# Patient Record
Sex: FEMALE | Race: WHITE | Employment: OTHER | URBAN - METROPOLITAN AREA
[De-identification: names, ages, dates, MRNs, and addresses within clinical notes are randomized per-mention and may not be internally consistent; named-entity substitution may affect disease eponyms.]

---

## 2017-09-10 PROBLEM — N39.0 UTI (URINARY TRACT INFECTION): Status: ACTIVE | Noted: 2017-09-10

## 2017-09-10 PROBLEM — K21.9 GERD (GASTROESOPHAGEAL REFLUX DISEASE): Status: ACTIVE | Noted: 2017-09-10

## 2017-09-10 PROBLEM — E78.5 HLD (HYPERLIPIDEMIA): Status: ACTIVE | Noted: 2017-09-10

## 2017-09-10 PROBLEM — I50.32 CHRONIC DIASTOLIC CHF (CONGESTIVE HEART FAILURE) (HCC): Status: ACTIVE | Noted: 2017-09-10

## 2017-09-10 PROBLEM — S72.001A CLOSED RIGHT HIP FRACTURE (HCC): Status: ACTIVE | Noted: 2017-09-10

## 2017-09-10 PROBLEM — I10 HTN (HYPERTENSION): Status: ACTIVE | Noted: 2017-09-10

## 2017-09-18 ENCOUNTER — CARE COORDINATION (OUTPATIENT)
Dept: CASE MANAGEMENT | Age: 82
End: 2017-09-18

## 2017-09-28 ENCOUNTER — OFFICE VISIT (OUTPATIENT)
Dept: ORTHOPEDIC SURGERY | Age: 82
End: 2017-09-28

## 2017-09-28 VITALS — HEIGHT: 60 IN | BODY MASS INDEX: 31.41 KG/M2 | WEIGHT: 160 LBS

## 2017-09-28 DIAGNOSIS — S72.001A CLOSED RIGHT HIP FRACTURE, INITIAL ENCOUNTER (HCC): Primary | ICD-10-CM

## 2017-09-28 PROCEDURE — 99024 POSTOP FOLLOW-UP VISIT: CPT | Performed by: NURSE PRACTITIONER

## 2017-09-28 PROCEDURE — 73502 X-RAY EXAM HIP UNI 2-3 VIEWS: CPT | Performed by: NURSE PRACTITIONER

## 2017-09-29 ENCOUNTER — CARE COORDINATION (OUTPATIENT)
Dept: CASE MANAGEMENT | Age: 82
End: 2017-09-29

## 2017-09-30 ENCOUNTER — CARE COORDINATION (OUTPATIENT)
Dept: CASE MANAGEMENT | Age: 82
End: 2017-09-30

## 2017-10-02 ENCOUNTER — TELEPHONE (OUTPATIENT)
Dept: ORTHOPEDIC SURGERY | Age: 82
End: 2017-10-02

## 2017-10-06 ENCOUNTER — CARE COORDINATION (OUTPATIENT)
Dept: CASE MANAGEMENT | Age: 82
End: 2017-10-06

## 2017-10-24 ENCOUNTER — CARE COORDINATION (OUTPATIENT)
Dept: CASE MANAGEMENT | Age: 82
End: 2017-10-24

## 2017-10-24 NOTE — CARE COORDINATION
Patient. Jimmy Echevarria  Date of Birth. 3/11/1930  MRN. [id number]     [facility] attempted to reach patient for [address] follow up call . No answer, Left message with reason for call and contact information.         Follow up appointments:    Future Appointments  Date Time Provider Kevin Haji   11/9/2017 2:30 PM Bhavesh Black MD [facility]     Naheed Mei MSN, MA, RN  Care Transition Coordinator  [phone]

## 2017-10-25 NOTE — CARE COORDINATION
Patient. Hawk Alcala  Date of Birth. 3/11/1930  MRN. [id number]     Spoke with patient's daughter    Incision status: completely healed    Edema/Swelling: none    Pain level and status: managed well    Use of pain medications: only as needed    Bowels: no issues with constipation    Home Care Agency active: [facility],   Skilled nursing is completed, PT/OT still working with her.      Do you have all of your medications: yes    Changes in medications: no    Follow up appointments:    Future Appointments  Date Time Provider Kevin Haji   11/9/2017 2:30 PM Marvin Cates MD [facility]       Naheed Mei MSN, MA, RN  Care Transition Coordinator  [phone]

## 2017-11-13 ENCOUNTER — CARE COORDINATION (OUTPATIENT)
Dept: CASE MANAGEMENT | Age: 82
End: 2017-11-13

## 2017-11-14 ENCOUNTER — OFFICE VISIT (OUTPATIENT)
Dept: ORTHOPEDIC SURGERY | Age: 82
End: 2017-11-14

## 2017-11-14 VITALS — RESPIRATION RATE: 15 BRPM | WEIGHT: 160 LBS | BODY MASS INDEX: 31.41 KG/M2 | HEIGHT: 60 IN

## 2017-11-14 DIAGNOSIS — S72.001A CLOSED FRACTURE OF RIGHT HIP, INITIAL ENCOUNTER (HCC): Primary | ICD-10-CM

## 2017-11-14 DIAGNOSIS — S90.32XA CONTUSION OF LEFT FOOT, INITIAL ENCOUNTER: ICD-10-CM

## 2017-11-14 DIAGNOSIS — M79.672 LEFT FOOT PAIN: ICD-10-CM

## 2017-11-14 PROCEDURE — 73502 X-RAY EXAM HIP UNI 2-3 VIEWS: CPT | Performed by: ORTHOPAEDIC SURGERY

## 2017-11-14 PROCEDURE — G8417 CALC BMI ABV UP PARAM F/U: HCPCS | Performed by: ORTHOPAEDIC SURGERY

## 2017-11-14 PROCEDURE — 73630 X-RAY EXAM OF FOOT: CPT | Performed by: ORTHOPAEDIC SURGERY

## 2017-11-14 PROCEDURE — 4040F PNEUMOC VAC/ADMIN/RCVD: CPT | Performed by: ORTHOPAEDIC SURGERY

## 2017-11-14 PROCEDURE — 99213 OFFICE O/P EST LOW 20 MIN: CPT | Performed by: ORTHOPAEDIC SURGERY

## 2017-11-14 PROCEDURE — G8484 FLU IMMUNIZE NO ADMIN: HCPCS | Performed by: ORTHOPAEDIC SURGERY

## 2017-11-14 PROCEDURE — 1036F TOBACCO NON-USER: CPT | Performed by: ORTHOPAEDIC SURGERY

## 2017-11-14 PROCEDURE — 1090F PRES/ABSN URINE INCON ASSESS: CPT | Performed by: ORTHOPAEDIC SURGERY

## 2017-11-14 PROCEDURE — G8427 DOCREV CUR MEDS BY ELIG CLIN: HCPCS | Performed by: ORTHOPAEDIC SURGERY

## 2017-11-14 PROCEDURE — 1123F ACP DISCUSS/DSCN MKR DOCD: CPT | Performed by: ORTHOPAEDIC SURGERY

## 2017-11-14 NOTE — PROGRESS NOTES
use No    Drug use: No    Sexual activity: Not Currently     Other Topics Concern    Not on file     Social History Narrative    No narrative on file       Family History   Problem Relation Age of Onset    Heart Disease Mother     Cancer Father        Current Outpatient Prescriptions on File Prior to Visit   Medication Sig Dispense Refill    acetaminophen (TYLENOL) 325 MG tablet Take 2 tablets by mouth every 4 hours as needed for Pain or Fever 120 tablet 3    ALPRAZolam (XANAX) 0.25 MG tablet Take 1 tablet by mouth 2 times daily 1-2 tabs daily 10 tablet 0    albuterol (PROVENTIL) (2.5 MG/3ML) 0.083% nebulizer solution Take 3 mLs by nebulization every 4 hours as needed for Wheezing or Shortness of Breath 120 each 3    apixaban (ELIQUIS) 5 MG TABS tablet Take 1 tablet by mouth 2 times daily 166 tablet 0    diphenhydrAMINE (BENADRYL) 25 MG tablet Take 25 mg by mouth daily as needed for Allergies       guaiFENesin-codeine (CHERATUSSIN AC) 100-10 MG/5ML syrup Take 5 mLs by mouth every 4 hours as needed for Cough      amLODIPine (NORVASC) 2.5 MG tablet Take 2.5 mg by mouth daily      furosemide (LASIX) 40 MG tablet Take 40 mg by mouth daily      atorvastatin (LIPITOR) 10 MG tablet Take 10 mg by mouth daily      OXYGEN nightly.  Multiple Vitamins-Minerals (MULTI FOR HER 50+ PO) Take  by mouth daily.  Methylcellulose, Laxative, (CITRUCEL PO) Take  by mouth as needed.  Calcium Carbonate Antacid (TUMS ULTRA 1000 PO) Take 1 tablet by mouth daily       carvedilol (COREG) 3.125 MG tablet Take 6.25 mg by mouth 2 times daily (with meals)       omeprazole (PRILOSEC) 20 MG capsule Take 20 mg by mouth 2 times daily.  dicyclomine (BENTYL) 10 MG capsule Take 10 mg by mouth 4 times daily as needed       Fluticasone-Salmeterol (ADVAIR DISKUS IN) Inhale 2 puffs into the lungs 2 times daily.       docusate sodium (COLACE) 100 MG capsule Take 100 mg by mouth 2 times daily       alendronate

## 2017-11-14 NOTE — LETTER
 HIP ARTHROPLASTY Right 09/12/2017    right bipolar hip    MOHS SURGERY  7/2012    PARATHYROIDECTOMY      [place] LEFT HEART CATH,PERCUTANEOUS      Cardiac Cath, Left Heart       Social History     Social History    Marital status:      Spouse name: N/A    Number of children: N/A    Years of education: N/A     Occupational History    Not on file. Social History Main Topics    Smoking status: Former Smoker    Smokeless tobacco: Never Used    Alcohol use No    Drug use: No    Sexual activity: Not Currently     Other Topics Concern    Not on file     Social History Narrative    No narrative on file       Family History   Problem Relation Age of Onset    Heart Disease Mother     Cancer Father        Current Outpatient Prescriptions on File Prior to Visit   Medication Sig Dispense Refill    acetaminophen (TYLENOL) 325 MG tablet Take 2 tablets by mouth every 4 hours as needed for Pain or Fever 120 tablet 3    ALPRAZolam (XANAX) 0.25 MG tablet Take 1 tablet by mouth 2 times daily 1-2 tabs daily 10 tablet 0    albuterol (PROVENTIL) (2.5 MG/3ML) 0.083% nebulizer solution Take 3 mLs by nebulization every 4 hours as needed for Wheezing or Shortness of Breath 120 each 3    apixaban (ELIQUIS) 5 MG TABS tablet Take 1 tablet by mouth 2 times daily 166 tablet 0    diphenhydrAMINE (BENADRYL) 25 MG tablet Take 25 mg by mouth daily as needed for Allergies       guaiFENesin-codeine (CHERATUSSIN AC) 100-10 MG/5ML syrup Take 5 mLs by mouth every 4 hours as needed for Cough      amLODIPine (NORVASC) 2.5 MG tablet Take 2.5 mg by mouth daily      furosemide (LASIX) 40 MG tablet Take 40 mg by mouth daily      atorvastatin (LIPITOR) 10 MG tablet Take 10 mg by mouth daily      OXYGEN nightly.  Multiple Vitamins-Minerals (MULTI FOR HER 50+ PO) Take  by mouth daily.  Methylcellulose, Laxative, (CITRUCEL PO) Take  by mouth as needed.

## 2017-11-15 ENCOUNTER — CARE COORDINATION (OUTPATIENT)
Dept: CASE MANAGEMENT | Age: 82
End: 2017-11-15

## 2017-11-15 NOTE — CARE COORDINATION
Spoke with Sofiya Carter    Incision status: Completely healed    Edema/Swelling: denies swelling or edema    Pain level and status: Denies significant hip pain, occasionally has pain that improves with rest.     Use of pain medications: none    Bowels: active no issues or concerns    Outpatient therapy: Rehab at [facility], and progressing well.     Do you have all of your medications: yes    Changes in medications: none    Had orthopedic appt with Dr. Reta English yesterday 11/14/17    Follow up appointments:    Future Appointments  Date Time Provider Kevin Haji   12/21/2017 1:45 PM Bruce Siegel MD [facility]       Cuauhtemoc Barker MSN, MA, RN  Care Transition Coordinator   [phone]

## 2017-11-27 ENCOUNTER — TELEPHONE (OUTPATIENT)
Dept: CARDIOLOGY CLINIC | Age: 82
End: 2017-11-27

## 2017-11-27 NOTE — TELEPHONE ENCOUNTER
Please review and advise. I have called Dr. Gracie Hicks office and requested records to be faxed to our office. I requested last couple o/v notes, most recent labs, ekg, echo, gxt. Patient will be a new patient to us since her last o/v was in 2012. Please give date/time that pt can come in so that the front office can schedule. Thank you.

## 2017-11-27 NOTE — TELEPHONE ENCOUNTER
I was able to grab a 15 min slot on 12/11/17 at 11:15am. (even though it is only 15 min for a NP) I already scheduled it.  Please call pt family

## 2017-12-01 ENCOUNTER — CARE COORDINATION (OUTPATIENT)
Dept: CASE MANAGEMENT | Age: 82
End: 2017-12-01

## 2017-12-07 NOTE — COMMUNICATION BODY
DIAGNOSIS:    1- Right femoral neck displaced fracture, status post Press-Fit bipolar hemiarthroplasty. 2- Left foot pain/ foot contusion-new    DATE OF SURGERY:  9/10/2017  . HISTORY OF PRESENT ILLNESS:  Ms. Cole Lisa 80 y.o.  female who came in today for evaluation of new left foot pain and 6 weeks postoperative visit. The patient denies any significant pain in the right hip. Rates pain a 4-5/10 VAS mild, aching, intermittent and improving. Pain is worse with walking and better with rest.  She  has been walking weightbearing as tolerated using a walker working with PT in rehab at [facility] and doing well. No numbness or tingling sensation. No fever or Chills. She is here today for the first visit for evaluation of a left foot injury which occurred when her walker fell and hit her foot. She is complaining of foot pain and swelling. Rates pain a 3-4/10 VAS and constant throbbing. This is better with elevation and worse with bearing wt. The pain is sharp and not radiating. No other complaint. She is on Eliquis. Past Medical History:   Diagnosis Date    Cardiomyopathy [facility])     Chemotherapy     CHF (congestive heart failure) (HCC)     Emphysema     Emphysema     Emphysema (subcutaneous) (surgical) resulting from a procedure     Hypertension     Osteoarthritis     Thyroid disease     Had parathyroidectomy       Past Surgical History:   Procedure Laterality Date    BREAST SURGERY      Left breast lumpectomy    FOOT SURGERY      toe    HIP ARTHROPLASTY Right 09/12/2017    right bipolar hip    MOHS SURGERY  7/2012    PARATHYROIDECTOMY      [place] LEFT HEART CATH,PERCUTANEOUS      Cardiac Cath, Left Heart       Social History     Social History    Marital status:      Spouse name: N/A    Number of children: N/A    Years of education: N/A     Occupational History    Not on file.      Social History Main Topics    Smoking status: Former Smoker    Smokeless tobacco: Never Used    Alcohol use No    Drug use: No    Sexual activity: Not Currently     Other Topics Concern    Not on file     Social History Narrative    No narrative on file       Family History   Problem Relation Age of Onset    Heart Disease Mother     Cancer Father        Current Outpatient Prescriptions on File Prior to Visit   Medication Sig Dispense Refill    acetaminophen (TYLENOL) 325 MG tablet Take 2 tablets by mouth every 4 hours as needed for Pain or Fever 120 tablet 3    ALPRAZolam (XANAX) 0.25 MG tablet Take 1 tablet by mouth 2 times daily 1-2 tabs daily 10 tablet 0    albuterol (PROVENTIL) (2.5 MG/3ML) 0.083% nebulizer solution Take 3 mLs by nebulization every 4 hours as needed for Wheezing or Shortness of Breath 120 each 3    apixaban (ELIQUIS) 5 MG TABS tablet Take 1 tablet by mouth 2 times daily 166 tablet 0    diphenhydrAMINE (BENADRYL) 25 MG tablet Take 25 mg by mouth daily as needed for Allergies       guaiFENesin-codeine (CHERATUSSIN AC) 100-10 MG/5ML syrup Take 5 mLs by mouth every 4 hours as needed for Cough      amLODIPine (NORVASC) 2.5 MG tablet Take 2.5 mg by mouth daily      furosemide (LASIX) 40 MG tablet Take 40 mg by mouth daily      atorvastatin (LIPITOR) 10 MG tablet Take 10 mg by mouth daily      OXYGEN nightly.  Multiple Vitamins-Minerals (MULTI FOR HER 50+ PO) Take  by mouth daily.  Methylcellulose, Laxative, (CITRUCEL PO) Take  by mouth as needed.  Calcium Carbonate Antacid (TUMS ULTRA 1000 PO) Take 1 tablet by mouth daily       carvedilol (COREG) 3.125 MG tablet Take 6.25 mg by mouth 2 times daily (with meals)       omeprazole (PRILOSEC) 20 MG capsule Take 20 mg by mouth 2 times daily.  dicyclomine (BENTYL) 10 MG capsule Take 10 mg by mouth 4 times daily as needed       Fluticasone-Salmeterol (ADVAIR DISKUS IN) Inhale 2 puffs into the lungs 2 times daily.       docusate sodium (COLACE) 100 MG capsule Take 100 mg by mouth 2 times daily       alendronate For the hip:  I have told the patient to continue PT, weightbearing as tolerated, as well as strengthening exercises. The patient will come back for a follow up in 6 weeks. At that time, we will take AP pelvis and 2 views of the affected hip. For the foot: I assured the patient that the xray is negative for acute fracture. We discussed the risk of nondisplaced fracture. She can be WBAT and avoid heavy impact. We will see her  back in 6 weeks. Will consider repeat Xray if her pain has not improved. As this patient has demonstrated risk factors for osteoporosis, such as age greater than [de-identified] years and evidence of a fracture, I have referred the patient back to the primary care physician for evaluation for osteoporosis, including consideration for DEXA scanning, if this is felt to be clinically indicated. The patient is advised to contact the primary care physician to follow-up for further evaluation.        Evelia Love MD

## 2017-12-11 ENCOUNTER — OFFICE VISIT (OUTPATIENT)
Dept: CARDIOLOGY CLINIC | Age: 82
End: 2017-12-11

## 2017-12-11 VITALS
HEIGHT: 60 IN | SYSTOLIC BLOOD PRESSURE: 138 MMHG | HEART RATE: 48 BPM | DIASTOLIC BLOOD PRESSURE: 84 MMHG | WEIGHT: 144 LBS | OXYGEN SATURATION: 98 % | BODY MASS INDEX: 28.27 KG/M2

## 2017-12-11 DIAGNOSIS — R06.02 SOB (SHORTNESS OF BREATH): Primary | ICD-10-CM

## 2017-12-11 DIAGNOSIS — R06.09 DYSPNEA ON EXERTION: ICD-10-CM

## 2017-12-11 DIAGNOSIS — R00.1 SINUS BRADYCARDIA: ICD-10-CM

## 2017-12-11 DIAGNOSIS — I42.9 CARDIOMYOPATHY, UNSPECIFIED TYPE (HCC): ICD-10-CM

## 2017-12-11 DIAGNOSIS — I10 ESSENTIAL HYPERTENSION: ICD-10-CM

## 2017-12-11 PROCEDURE — 1090F PRES/ABSN URINE INCON ASSESS: CPT | Performed by: INTERNAL MEDICINE

## 2017-12-11 PROCEDURE — G8484 FLU IMMUNIZE NO ADMIN: HCPCS | Performed by: INTERNAL MEDICINE

## 2017-12-11 PROCEDURE — 1123F ACP DISCUSS/DSCN MKR DOCD: CPT | Performed by: INTERNAL MEDICINE

## 2017-12-11 PROCEDURE — 99215 OFFICE O/P EST HI 40 MIN: CPT | Performed by: INTERNAL MEDICINE

## 2017-12-11 PROCEDURE — 93000 ELECTROCARDIOGRAM COMPLETE: CPT | Performed by: INTERNAL MEDICINE

## 2017-12-11 PROCEDURE — G8427 DOCREV CUR MEDS BY ELIG CLIN: HCPCS | Performed by: INTERNAL MEDICINE

## 2017-12-11 PROCEDURE — G8417 CALC BMI ABV UP PARAM F/U: HCPCS | Performed by: INTERNAL MEDICINE

## 2017-12-11 PROCEDURE — 4040F PNEUMOC VAC/ADMIN/RCVD: CPT | Performed by: INTERNAL MEDICINE

## 2017-12-11 PROCEDURE — 1036F TOBACCO NON-USER: CPT | Performed by: INTERNAL MEDICINE

## 2017-12-11 RX ORDER — ASPIRIN 81 MG/1
81 TABLET, CHEWABLE ORAL DAILY
COMMUNITY
End: 2017-12-11 | Stop reason: ALTCHOICE

## 2017-12-11 RX ORDER — TIZANIDINE 2 MG/1
2 TABLET ORAL EVERY 8 HOURS PRN
Status: ON HOLD | COMMUNITY
End: 2018-04-16 | Stop reason: HOSPADM

## 2017-12-11 RX ORDER — VITAMIN B COMPLEX
1 CAPSULE ORAL 2 TIMES DAILY
Status: ON HOLD | COMMUNITY
End: 2018-04-09 | Stop reason: ALTCHOICE

## 2017-12-11 RX ORDER — HYDROCODONE BITARTRATE AND ACETAMINOPHEN 5; 325 MG/1; MG/1
1 TABLET ORAL EVERY 8 HOURS PRN
Status: ON HOLD | COMMUNITY
End: 2019-01-01 | Stop reason: HOSPADM

## 2017-12-11 NOTE — PATIENT INSTRUCTIONS
Patient Education        High Blood Pressure: Care Instructions  Your Care Instructions    If your blood pressure is usually above 140/90, you have high blood pressure, or hypertension. That means the top number is 140 or higher or the bottom number is 90 or higher, or both. Despite what a lot of people think, high blood pressure usually doesn't cause headaches or make you feel dizzy or lightheaded. It usually has no symptoms. But it does increase your risk for heart attack, stroke, and kidney or eye damage. The higher your blood pressure, the more your risk increases. Your doctor will give you a goal for your blood pressure. Your goal will be based on your health and your age. An example of a goal is to keep your blood pressure below 140/90. Lifestyle changes, such as eating healthy and being active, are always important to help lower blood pressure. You might also take medicine to reach your blood pressure goal.  Follow-up care is a key part of your treatment and safety. Be sure to make and go to all appointments, and call your doctor if you are having problems. It's also a good idea to know your test results and keep a list of the medicines you take. How can you care for yourself at home? Medical treatment  · If you stop taking your medicine, your blood pressure will go back up. You may take one or more types of medicine to lower your blood pressure. Be safe with medicines. Take your medicine exactly as prescribed. Call your doctor if you think you are having a problem with your medicine. · Talk to your doctor before you start taking aspirin every day. Aspirin can help certain people lower their risk of a heart attack or stroke. But taking aspirin isn't right for everyone, because it can cause serious bleeding. · See your doctor regularly. You may need to see the doctor more often at first or until your blood pressure comes down.   · If you are taking blood pressure medicine, talk to your doctor before arms.  ¨ Lightheadedness or sudden weakness. ¨ A fast or irregular heartbeat. ? · You have symptoms of a stroke. These may include:  ¨ Sudden numbness, tingling, weakness, or loss of movement in your face, arm, or leg, especially on only one side of your body. ¨ Sudden vision changes. ¨ Sudden trouble speaking. ¨ Sudden confusion or trouble understanding simple statements. ¨ Sudden problems with walking or balance. ¨ A sudden, severe headache that is different from past headaches. ? · You have severe back or belly pain. ?Do not wait until your blood pressure comes down on its own. Get help right away. ?Call your doctor now or seek immediate care if:  ? · Your blood pressure is much higher than normal (such as 180/110 or higher), but you don't have symptoms. ? · You think high blood pressure is causing symptoms, such as:  ¨ Severe headache. ¨ Blurry vision. ? Watch closely for changes in your health, and be sure to contact your doctor if:  ? · Your blood pressure measures 140/90 or higher at least 2 times. That means the top number is 140 or higher or the bottom number is 90 or higher, or both. ? · You think you may be having side effects from your blood pressure medicine. ? · Your blood pressure is usually normal, but it goes above normal at least 2 times. Where can you learn more? Go to https://[email or web address]. org and sign in to your You[employer] account. Enter B962 in the [facility] box to learn more about \"High Blood Pressure: Care Instructions. \"     If you do not have an account, please click on the \"Sign Up Now\" link. Current as of: September 21, 2016  Content Version: 11.4  © 9404-2506 [employer]. Care instructions adapted under license by [facility] [facility]  If you have questions about a medical condition or this instruction, always ask your healthcare professional. Deja Chaudhary any warranty or liability for your use of this information.

## 2017-12-13 ENCOUNTER — CARE COORDINATION (OUTPATIENT)
Dept: CASE MANAGEMENT | Age: 82
End: 2017-12-13

## 2017-12-13 PROBLEM — T68.XXXA HYPOTHERMIA: Status: ACTIVE | Noted: 2017-12-13

## 2017-12-13 NOTE — CARE COORDINATION
[facility] Follow Up Call    2017    Patient: Hawk Alcala  Patient : 3/11/1930   MRN: [id number]  Reason for Admission: Right hip hemiarthroplasty        Last [address] phone call made, left contact info on vm and informed that this was the last [address] call, program completed     Follow Up  Future Appointments  Date Time Provider Kevin Haji   2017 1:45 PM Marvin Cates MD [facility]       Beata Noland RN

## 2017-12-14 PROBLEM — R13.10 DYSPHAGIA: Status: ACTIVE | Noted: 2017-12-14

## 2017-12-16 PROBLEM — T68.XXXA HYPOTHERMIA: Status: RESOLVED | Noted: 2017-12-13 | Resolved: 2017-12-16

## 2017-12-21 ENCOUNTER — OFFICE VISIT (OUTPATIENT)
Dept: ORTHOPEDIC SURGERY | Age: 82
End: 2017-12-21

## 2017-12-21 VITALS
WEIGHT: 160 LBS | HEIGHT: 60 IN | BODY MASS INDEX: 31.41 KG/M2 | HEART RATE: 83 BPM | SYSTOLIC BLOOD PRESSURE: 123 MMHG | DIASTOLIC BLOOD PRESSURE: 68 MMHG | RESPIRATION RATE: 16 BRPM

## 2017-12-21 DIAGNOSIS — S72.001A CLOSED FRACTURE OF RIGHT HIP, INITIAL ENCOUNTER (HCC): Primary | ICD-10-CM

## 2017-12-21 PROCEDURE — G8427 DOCREV CUR MEDS BY ELIG CLIN: HCPCS | Performed by: ORTHOPAEDIC SURGERY

## 2017-12-21 PROCEDURE — 1090F PRES/ABSN URINE INCON ASSESS: CPT | Performed by: ORTHOPAEDIC SURGERY

## 2017-12-21 PROCEDURE — G8417 CALC BMI ABV UP PARAM F/U: HCPCS | Performed by: ORTHOPAEDIC SURGERY

## 2017-12-21 PROCEDURE — 1111F DSCHRG MED/CURRENT MED MERGE: CPT | Performed by: ORTHOPAEDIC SURGERY

## 2017-12-21 PROCEDURE — 4040F PNEUMOC VAC/ADMIN/RCVD: CPT | Performed by: ORTHOPAEDIC SURGERY

## 2017-12-21 PROCEDURE — 1036F TOBACCO NON-USER: CPT | Performed by: ORTHOPAEDIC SURGERY

## 2017-12-21 PROCEDURE — 1123F ACP DISCUSS/DSCN MKR DOCD: CPT | Performed by: ORTHOPAEDIC SURGERY

## 2017-12-21 PROCEDURE — G8484 FLU IMMUNIZE NO ADMIN: HCPCS | Performed by: ORTHOPAEDIC SURGERY

## 2017-12-21 PROCEDURE — 99213 OFFICE O/P EST LOW 20 MIN: CPT | Performed by: ORTHOPAEDIC SURGERY

## 2017-12-21 PROCEDURE — 73502 X-RAY EXAM HIP UNI 2-3 VIEWS: CPT | Performed by: ORTHOPAEDIC SURGERY

## 2017-12-21 NOTE — LETTER
 [place] LEFT HEART CATH,PERCUTANEOUS      Cardiac Cath, Left Heart       Social History     Social History    Marital status:      Spouse name: N/A    Number of children: N/A    Years of education: N/A     Occupational History    Not on file. Social History Main Topics    Smoking status: Former Smoker     Types: Cigarettes     Quit date: 12/13/1980    Smokeless tobacco: Never Used    Alcohol use No    Drug use: No    Sexual activity: Not Currently     Other Topics Concern    Not on file     Social History Narrative    No narrative on file       Family History   Problem Relation Age of Onset    Heart Disease Mother     Cancer Father     Hearing Loss Father     Cancer Brother      bladder    Allergies Brother     Other Brother      GI issues, unknown    Cancer Brother      skin    Parkinsonism Brother     Asthma Brother        Current Outpatient Prescriptions on File Prior to Visit   Medication Sig Dispense Refill    apixaban (ELIQUIS) 5 MG TABS tablet Take 1 tablet by mouth 2 times daily 166 tablet 0    lisinopril (PRINIVIL;ZESTRIL) 40 MG tablet Take 40 mg by mouth every evening      fluticasone-salmeterol (ADVAIR HFA) 115-21 MCG/ACT inhaler Inhale 2 puffs into the lungs 2 times daily      Calcium Carbonate-Vitamin D (CALCIUM-VITAMIN D) 500-200 MG-UNIT per tablet Take 1 tablet by mouth daily      calcium carbonate (TUMS EX) 750 MG chewable tablet Take 1 tablet by mouth daily      HYDROcodone-acetaminophen (NORCO) 5-325 MG per tablet Take 1 tablet by mouth every 4 hours as needed for Pain  .       tiZANidine (ZANAFLEX) 2 MG tablet Take 2 mg by mouth every 8 hours as needed      b complex vitamins capsule Take 1 capsule by mouth 2 times daily      albuterol (PROVENTIL) (2.5 MG/3ML) 0.083% nebulizer solution Take 3 mLs by nebulization every 6 hours as needed for Wheezing or Shortness of Breath 120 each 3    acetaminophen (TYLENOL) 325 MG tablet Take 2 tablets by mouth every 4 hours as needed for Pain or Fever 120 tablet 3    guaiFENesin-codeine (CHERATUSSIN AC) 100-10 MG/5ML syrup Take 5-10 mLs by mouth every 4 hours as needed for Cough  .  amLODIPine (NORVASC) 2.5 MG tablet Take 2.5 mg by mouth daily      furosemide (LASIX) 40 MG tablet Take 40 mg by mouth daily      Methylcellulose, Laxative, (CITRUCEL PO) Take 2 tablets by mouth daily       omeprazole (PRILOSEC) 20 MG capsule Take 20 mg by mouth 2 times daily.  dicyclomine (BENTYL) 10 MG capsule Take 10 mg by mouth 4 times daily as needed (bowel cramps)       docusate sodium (COLACE) 100 MG capsule Take 100 mg by mouth 2 times daily       metroNIDAZOLE (METROGEL) 0.75 % gel Apply topically 2 times daily Apply topically 2 times daily.  vitamin D (CHOLECALCIFEROL) 1000 units TABS tablet Take 1,000 Units by mouth daily      Respiratory Therapy Supplies (NEBULIZER/ADULT MASK) KIT 1 ampule by Does not apply route every 8 hours as needed (shortness of breath) 1 kit 0    OXYGEN nightly.  Multiple Vitamins-Minerals (MULTI FOR HER 50+ PO) Take 1 tablet by mouth daily       Misc Natural Products (GLUCOSAMINE CHONDROITIN ADV PO) Take 1 tablet by mouth daily        No current facility-administered medications on file prior to visit. Pertinent items are noted in HPI  Review of systems reviewed from Patient History Form dated on 11/14/2017 and available in the patient's chart under the Media tab. No change noted. PHYSICAL EXAMINATION:  Ms. Jad Lyons is a very pleasant 80 y.o.  female who presents today in no acute distress, awake, alert, and oriented. She is well dressed, nourished and  groomed. Patient with normal affect. Height is  5' (1.524 m), weight is 160 lb (72.6 kg), Body mass index is 31.25 kg/m². Resting respiratory rate is 16. She ambulates today , no limp, using a walker. The incision is completely healed right hip. No signs of any erythema or drainage.   She has no pain with the active or passive range of motion of the right hip. She has intact sensation, distally, and she is neurovascularly intact. She  has intact sensation and good pedal pulses. She has no tenderness on deep palpation over the left foot. Jerk Good strength, and no instability both upper and lower extremities. IMAGING:  X-rays were taken in the office today, AP pelvis and 2 views of the right hip and femur, and showed the Press-Fit hemiarthroplasty in good position. No signs of any lucency. There is barium seen in the bowel. Leeanne Cortes were reviewed, Dated 11/14/2017 in office, 3 views of the left  foot, and showed no displaced fracture. IMPRESSION:    1- Right hip hemiarthroplasty and doing very well. 2- Left foot contusion-new. PLAN:  For the hip:  I have told the patient to continue PT, weightbearing as tolerated, as well as strengthening exercises. The patient will come back for a follow up in 3 months. At that time, we will take AP pelvis and 2 views of the affected hip. For the foot: She can go back to normal activity with no restrictions. She will be seen PRN. As this patient has demonstrated risk factors for osteoporosis, such as age greater than [de-identified] years and evidence of a fracture, I have referred the patient back to the primary care physician for evaluation for osteoporosis, including consideration for DEXA scanning, if this is felt to be clinically indicated. The patient is advised to contact the primary care physician to follow-up for further evaluation. Alesia Rowland MD                    If you have questions, please do not hesitate to call me. I look forward to following Kaitlynn along with you.     Sincerely,        Alesia Rowland MD     providers:  Niharika Aparicio MD  [address] [place] [address] Avenue: [phone]

## 2017-12-21 NOTE — PROGRESS NOTES
DIAGNOSIS:    1- Right femoral neck displaced fracture, status post Press-Fit bipolar hemiarthroplasty. 2- Left foot pain/ foot contusion-new    DATE OF SURGERY:  9/10/2017  . HISTORY OF PRESENT ILLNESS:  Ms. Tamie Slaughter 80 y.o.  female who came in today for 3 months postoperative visit. The patient denies any significant pain in the right hip. Rates pain a 0/10 VAS and is doing very well. Much improved. She  has been walking weightbearing as tolerated using a walker and has been working with PT in a NH with good improvement. No numbness or tingling sensation. No fever or Chills. Her left foot pain has resolved and is much improved. No other complaint. She is on Eliquis. She was recently treated for a UTI and recently has barium for testing. Past Medical History:   Diagnosis Date    Cardiomyopathy [facility])     Chemotherapy     CHF (congestive heart failure) (HCC)     Emphysema     Emphysema     Emphysema (subcutaneous) (surgical) resulting from a procedure     Hypertension     Osteoarthritis     Thyroid disease     Had parathyroidectomy       Past Surgical History:   Procedure Laterality Date    BREAST SURGERY      Left breast lumpectomy    BUNIONECTOMY Right     CATARACT REMOVAL Bilateral     COLONOSCOPY  2011    EYE SURGERY      FOOT SURGERY      toe    HIP ARTHROPLASTY Right 09/12/2017    right bipolar hip    MOHS SURGERY  7/2012    PARATHYROID GLAND SURGERY  01/2008    removal    PARATHYROIDECTOMY      ME LEFT HEART CATH,PERCUTANEOUS      Cardiac Cath, Left Heart       Social History     Social History    Marital status:      Spouse name: N/A    Number of children: N/A    Years of education: N/A     Occupational History    Not on file.      Social History Main Topics    Smoking status: Former Smoker     Types: Cigarettes     Quit date: 12/13/1980    Smokeless tobacco: Never Used    Alcohol use No    Drug use: No    Sexual activity: Not Currently     Other Topics capsule Take 10 mg by mouth 4 times daily as needed (bowel cramps)       docusate sodium (COLACE) 100 MG capsule Take 100 mg by mouth 2 times daily       metroNIDAZOLE (METROGEL) 0.75 % gel Apply topically 2 times daily Apply topically 2 times daily.  vitamin D (CHOLECALCIFEROL) 1000 units TABS tablet Take 1,000 Units by mouth daily      Respiratory Therapy Supplies (NEBULIZER/ADULT MASK) KIT 1 ampule by Does not apply route every 8 hours as needed (shortness of breath) 1 kit 0    OXYGEN nightly.  Multiple Vitamins-Minerals (MULTI FOR HER 50+ PO) Take 1 tablet by mouth daily       Misc Natural Products (GLUCOSAMINE CHONDROITIN ADV PO) Take 1 tablet by mouth daily        No current facility-administered medications on file prior to visit. Pertinent items are noted in HPI  Review of systems reviewed from Patient History Form dated on 11/14/2017 and available in the patient's chart under the Media tab. No change noted. PHYSICAL EXAMINATION:  Ms. Elizabeth Ocasio is a very pleasant 80 y.o.  female who presents today in no acute distress, awake, alert, and oriented. She is well dressed, nourished and  groomed. Patient with normal affect. Height is  5' (1.524 m), weight is 160 lb (72.6 kg), Body mass index is 31.25 kg/m². Resting respiratory rate is 16. She ambulates today , no limp, using a walker. The incision is completely healed right hip. No signs of any erythema or drainage. She has no pain with the active or passive range of motion of the right hip. She has intact sensation, distally, and she is neurovascularly intact. She  has intact sensation and good pedal pulses. She has no tenderness on deep palpation over the left foot. Jerk Good strength, and no instability both upper and lower extremities. IMAGING:  X-rays were taken in the office today, AP pelvis and 2 views of the right hip and femur, and showed the Press-Fit hemiarthroplasty in good position.   No signs of any lucency. There is barium seen in the bowel. Pilar Fields were reviewed, Dated 11/14/2017 in office, 3 views of the left  foot, and showed no displaced fracture. IMPRESSION:    1- Right hip hemiarthroplasty and doing very well. 2- Left foot contusion-new. PLAN:  For the hip:  I have told the patient to continue PT, weightbearing as tolerated, as well as strengthening exercises. The patient will come back for a follow up in 3 months. At that time, we will take AP pelvis and 2 views of the affected hip. For the foot: She can go back to normal activity with no restrictions. She will be seen PRN. As this patient has demonstrated risk factors for osteoporosis, such as age greater than [de-identified] years and evidence of a fracture, I have referred the patient back to the primary care physician for evaluation for osteoporosis, including consideration for DEXA scanning, if this is felt to be clinically indicated. The patient is advised to contact the primary care physician to follow-up for further evaluation.        Marvin Cates MD

## 2018-01-01 ENCOUNTER — HOSPITAL ENCOUNTER (INPATIENT)
Age: 83
LOS: 8 days | Discharge: HOME HEALTH | DRG: 291 | End: 2018-12-01
Attending: EMERGENCY MEDICINE | Admitting: INTERNAL MEDICINE
Payer: MEDICARE

## 2018-01-01 ENCOUNTER — HOSPITAL ENCOUNTER (INPATIENT)
Age: 83
LOS: 5 days | Discharge: HOME HEALTH | DRG: 291 | End: 2018-10-19
Attending: EMERGENCY MEDICINE | Admitting: FAMILY MEDICINE
Payer: MEDICARE

## 2018-01-01 ENCOUNTER — APPOINTMENT (OUTPATIENT)
Dept: GENERAL RADIOLOGY | Age: 83
DRG: 291 | End: 2018-01-01
Payer: MEDICARE

## 2018-01-01 ENCOUNTER — APPOINTMENT (OUTPATIENT)
Dept: CT IMAGING | Age: 83
DRG: 291 | End: 2018-01-01
Payer: MEDICARE

## 2018-01-01 ENCOUNTER — CARE COORDINATION (OUTPATIENT)
Dept: CASE MANAGEMENT | Age: 83
End: 2018-01-01

## 2018-01-01 ENCOUNTER — TELEPHONE (OUTPATIENT)
Dept: CARDIOLOGY CLINIC | Age: 83
End: 2018-01-01

## 2018-01-01 ENCOUNTER — OFFICE VISIT (OUTPATIENT)
Dept: CARDIOLOGY CLINIC | Age: 83
End: 2018-01-01

## 2018-01-01 ENCOUNTER — OFFICE VISIT (OUTPATIENT)
Dept: CARDIOLOGY CLINIC | Age: 83
End: 2018-01-01
Payer: MEDICARE

## 2018-01-01 ENCOUNTER — CARE COORDINATION (OUTPATIENT)
Dept: CARE COORDINATION | Age: 83
End: 2018-01-01

## 2018-01-01 ENCOUNTER — OFFICE VISIT (OUTPATIENT)
Dept: ORTHOPEDIC SURGERY | Age: 83
End: 2018-01-01

## 2018-01-01 VITALS
BODY MASS INDEX: 23.39 KG/M2 | DIASTOLIC BLOOD PRESSURE: 84 MMHG | HEIGHT: 59 IN | HEART RATE: 65 BPM | BODY MASS INDEX: 22.98 KG/M2 | SYSTOLIC BLOOD PRESSURE: 120 MMHG | RESPIRATION RATE: 16 BRPM | OXYGEN SATURATION: 99 % | WEIGHT: 114 LBS | HEIGHT: 59 IN | WEIGHT: 116 LBS

## 2018-01-01 VITALS
SYSTOLIC BLOOD PRESSURE: 100 MMHG | DIASTOLIC BLOOD PRESSURE: 70 MMHG | HEART RATE: 116 BPM | WEIGHT: 130 LBS | BODY MASS INDEX: 26.21 KG/M2 | OXYGEN SATURATION: 93 % | HEIGHT: 59 IN

## 2018-01-01 VITALS
WEIGHT: 120 LBS | OXYGEN SATURATION: 94 % | SYSTOLIC BLOOD PRESSURE: 116 MMHG | BODY MASS INDEX: 24.19 KG/M2 | HEIGHT: 59 IN | HEART RATE: 98 BPM | DIASTOLIC BLOOD PRESSURE: 80 MMHG

## 2018-01-01 VITALS
SYSTOLIC BLOOD PRESSURE: 112 MMHG | DIASTOLIC BLOOD PRESSURE: 64 MMHG | WEIGHT: 109 LBS | HEIGHT: 59 IN | HEART RATE: 59 BPM | BODY MASS INDEX: 21.97 KG/M2

## 2018-01-01 VITALS
BODY MASS INDEX: 23.39 KG/M2 | SYSTOLIC BLOOD PRESSURE: 116 MMHG | WEIGHT: 116 LBS | OXYGEN SATURATION: 97 % | HEIGHT: 59 IN | DIASTOLIC BLOOD PRESSURE: 70 MMHG | HEART RATE: 73 BPM

## 2018-01-01 VITALS
BODY MASS INDEX: 23.64 KG/M2 | HEART RATE: 73 BPM | RESPIRATION RATE: 14 BRPM | SYSTOLIC BLOOD PRESSURE: 144 MMHG | HEIGHT: 59 IN | WEIGHT: 117.28 LBS | TEMPERATURE: 97.8 F | OXYGEN SATURATION: 92 % | DIASTOLIC BLOOD PRESSURE: 69 MMHG

## 2018-01-01 VITALS
HEIGHT: 59 IN | HEART RATE: 78 BPM | DIASTOLIC BLOOD PRESSURE: 68 MMHG | WEIGHT: 112.43 LBS | BODY MASS INDEX: 22.67 KG/M2 | RESPIRATION RATE: 16 BRPM | OXYGEN SATURATION: 96 % | SYSTOLIC BLOOD PRESSURE: 108 MMHG | TEMPERATURE: 97.8 F

## 2018-01-01 DIAGNOSIS — R09.02 HYPOXEMIA: ICD-10-CM

## 2018-01-01 DIAGNOSIS — R06.02 SOB (SHORTNESS OF BREATH): Primary | ICD-10-CM

## 2018-01-01 DIAGNOSIS — I48.91 ATRIAL FIBRILLATION, UNSPECIFIED TYPE (HCC): ICD-10-CM

## 2018-01-01 DIAGNOSIS — I50.1 PULMONARY EDEMA WITH CONGESTIVE HEART FAILURE (HCC): Primary | ICD-10-CM

## 2018-01-01 DIAGNOSIS — I27.20 PULMONARY HTN (HCC): ICD-10-CM

## 2018-01-01 DIAGNOSIS — R04.0 EPISTAXIS: Primary | ICD-10-CM

## 2018-01-01 DIAGNOSIS — I95.9 HYPOTENSION, UNSPECIFIED HYPOTENSION TYPE: ICD-10-CM

## 2018-01-01 DIAGNOSIS — I48.20 CHRONIC ATRIAL FIBRILLATION (HCC): ICD-10-CM

## 2018-01-01 DIAGNOSIS — I50.32 CHRONIC DIASTOLIC CHF (CONGESTIVE HEART FAILURE) (HCC): Primary | ICD-10-CM

## 2018-01-01 DIAGNOSIS — I50.42 CHRONIC COMBINED SYSTOLIC AND DIASTOLIC HEART FAILURE (HCC): ICD-10-CM

## 2018-01-01 DIAGNOSIS — I10 ESSENTIAL HYPERTENSION: ICD-10-CM

## 2018-01-01 DIAGNOSIS — R00.1 BRADYCARDIA: ICD-10-CM

## 2018-01-01 DIAGNOSIS — D64.9 ANEMIA, UNSPECIFIED TYPE: ICD-10-CM

## 2018-01-01 DIAGNOSIS — J44.9 CHRONIC OBSTRUCTIVE PULMONARY DISEASE, UNSPECIFIED COPD TYPE (HCC): ICD-10-CM

## 2018-01-01 DIAGNOSIS — J81.0 ACUTE PULMONARY EDEMA (HCC): ICD-10-CM

## 2018-01-01 DIAGNOSIS — N17.9 AKI (ACUTE KIDNEY INJURY) (HCC): Primary | ICD-10-CM

## 2018-01-01 DIAGNOSIS — I48.19 PERSISTENT ATRIAL FIBRILLATION (HCC): Primary | ICD-10-CM

## 2018-01-01 DIAGNOSIS — I42.9 CARDIOMYOPATHY, UNSPECIFIED TYPE (HCC): ICD-10-CM

## 2018-01-01 DIAGNOSIS — I48.20 CHRONIC ATRIAL FIBRILLATION (HCC): Primary | ICD-10-CM

## 2018-01-01 DIAGNOSIS — I50.42 CHRONIC COMBINED SYSTOLIC AND DIASTOLIC HEART FAILURE (HCC): Primary | ICD-10-CM

## 2018-01-01 DIAGNOSIS — S72.001A CLOSED FRACTURE OF RIGHT HIP, INITIAL ENCOUNTER (HCC): Primary | ICD-10-CM

## 2018-01-01 DIAGNOSIS — R06.02 SOB (SHORTNESS OF BREATH): ICD-10-CM

## 2018-01-01 DIAGNOSIS — I50.9 CHRONIC CONGESTIVE HEART FAILURE, UNSPECIFIED HEART FAILURE TYPE (HCC): Primary | ICD-10-CM

## 2018-01-01 DIAGNOSIS — I10 ESSENTIAL HYPERTENSION, BENIGN: ICD-10-CM

## 2018-01-01 LAB
A/G RATIO: 1.1 (ref 1.1–2.2)
ALBUMIN SERPL-MCNC: 3.7 G/DL (ref 3.4–5)
ALP BLD-CCNC: 118 U/L (ref 40–129)
ALT SERPL-CCNC: 29 U/L (ref 10–40)
ANION GAP SERPL CALCULATED.3IONS-SCNC: 10 MMOL/L (ref 3–16)
ANION GAP SERPL CALCULATED.3IONS-SCNC: 11 MMOL/L (ref 3–16)
ANION GAP SERPL CALCULATED.3IONS-SCNC: 11 MMOL/L (ref 3–16)
ANION GAP SERPL CALCULATED.3IONS-SCNC: 12 MMOL/L (ref 3–16)
ANION GAP SERPL CALCULATED.3IONS-SCNC: 12 MMOL/L (ref 3–16)
ANION GAP SERPL CALCULATED.3IONS-SCNC: 13 MMOL/L (ref 3–16)
ANION GAP SERPL CALCULATED.3IONS-SCNC: 14 MMOL/L (ref 3–16)
ANION GAP SERPL CALCULATED.3IONS-SCNC: 15 MMOL/L (ref 3–16)
ANION GAP SERPL CALCULATED.3IONS-SCNC: 18 MMOL/L (ref 3–16)
ANION GAP SERPL CALCULATED.3IONS-SCNC: 8 MMOL/L (ref 3–16)
AST SERPL-CCNC: 33 U/L (ref 15–37)
BASE EXCESS VENOUS: 0.9 MMOL/L
BASOPHILS ABSOLUTE: 0 K/UL (ref 0–0.2)
BASOPHILS ABSOLUTE: 0 K/UL (ref 0–0.2)
BASOPHILS RELATIVE PERCENT: 0.2 %
BASOPHILS RELATIVE PERCENT: 0.5 %
BILIRUB SERPL-MCNC: 0.4 MG/DL (ref 0–1)
BILIRUBIN URINE: NEGATIVE
BLOOD, URINE: NEGATIVE
BUN BLDV-MCNC: 33 MG/DL (ref 7–20)
BUN BLDV-MCNC: 34 MG/DL (ref 7–20)
BUN BLDV-MCNC: 39 MG/DL (ref 7–20)
BUN BLDV-MCNC: 44 MG/DL (ref 7–20)
BUN BLDV-MCNC: 48 MG/DL (ref 7–20)
BUN BLDV-MCNC: 51 MG/DL (ref 7–20)
BUN BLDV-MCNC: 52 MG/DL (ref 7–20)
BUN BLDV-MCNC: 53 MG/DL (ref 7–20)
BUN BLDV-MCNC: 53 MG/DL (ref 7–20)
BUN BLDV-MCNC: 54 MG/DL (ref 7–20)
BUN BLDV-MCNC: 57 MG/DL (ref 7–20)
BUN BLDV-MCNC: 60 MG/DL (ref 7–20)
BUN BLDV-MCNC: 61 MG/DL (ref 7–20)
BUN BLDV-MCNC: 65 MG/DL (ref 7–20)
BUN BLDV-MCNC: 70 MG/DL (ref 7–20)
CALCIUM SERPL-MCNC: 10.1 MG/DL (ref 8.3–10.6)
CALCIUM SERPL-MCNC: 9 MG/DL (ref 8.3–10.6)
CALCIUM SERPL-MCNC: 9 MG/DL (ref 8.3–10.6)
CALCIUM SERPL-MCNC: 9.1 MG/DL (ref 8.3–10.6)
CALCIUM SERPL-MCNC: 9.3 MG/DL (ref 8.3–10.6)
CALCIUM SERPL-MCNC: 9.3 MG/DL (ref 8.3–10.6)
CALCIUM SERPL-MCNC: 9.5 MG/DL (ref 8.3–10.6)
CALCIUM SERPL-MCNC: 9.5 MG/DL (ref 8.3–10.6)
CALCIUM SERPL-MCNC: 9.6 MG/DL (ref 8.3–10.6)
CALCIUM SERPL-MCNC: 9.7 MG/DL (ref 8.3–10.6)
CALCIUM SERPL-MCNC: 9.7 MG/DL (ref 8.3–10.6)
CALCIUM SERPL-MCNC: 9.8 MG/DL (ref 8.3–10.6)
CALCIUM SERPL-MCNC: 9.9 MG/DL (ref 8.3–10.6)
CARBOXYHEMOGLOBIN: 1.5 %
CHLORIDE BLD-SCNC: 100 MMOL/L (ref 99–110)
CHLORIDE BLD-SCNC: 101 MMOL/L (ref 99–110)
CHLORIDE BLD-SCNC: 101 MMOL/L (ref 99–110)
CHLORIDE BLD-SCNC: 102 MMOL/L (ref 99–110)
CHLORIDE BLD-SCNC: 105 MMOL/L (ref 99–110)
CHLORIDE BLD-SCNC: 90 MMOL/L (ref 99–110)
CHLORIDE BLD-SCNC: 90 MMOL/L (ref 99–110)
CHLORIDE BLD-SCNC: 92 MMOL/L (ref 99–110)
CHLORIDE BLD-SCNC: 94 MMOL/L (ref 99–110)
CHLORIDE BLD-SCNC: 95 MMOL/L (ref 99–110)
CHLORIDE BLD-SCNC: 96 MMOL/L (ref 99–110)
CHLORIDE BLD-SCNC: 99 MMOL/L (ref 99–110)
CHLORIDE BLD-SCNC: 99 MMOL/L (ref 99–110)
CHOLESTEROL, TOTAL: 133 MG/DL (ref 0–199)
CLARITY: CLEAR
CO2: 27 MMOL/L (ref 21–32)
CO2: 28 MMOL/L (ref 21–32)
CO2: 28 MMOL/L (ref 21–32)
CO2: 29 MMOL/L (ref 21–32)
CO2: 30 MMOL/L (ref 21–32)
CO2: 31 MMOL/L (ref 21–32)
CO2: 31 MMOL/L (ref 21–32)
CO2: 32 MMOL/L (ref 21–32)
CO2: 32 MMOL/L (ref 21–32)
COLOR: YELLOW
CREAT SERPL-MCNC: 1.1 MG/DL (ref 0.6–1.2)
CREAT SERPL-MCNC: 1.2 MG/DL (ref 0.6–1.2)
CREAT SERPL-MCNC: 1.2 MG/DL (ref 0.6–1.2)
CREAT SERPL-MCNC: 1.3 MG/DL (ref 0.6–1.2)
CREAT SERPL-MCNC: 1.3 MG/DL (ref 0.6–1.2)
CREAT SERPL-MCNC: 1.4 MG/DL (ref 0.6–1.2)
CREAT SERPL-MCNC: 1.5 MG/DL (ref 0.6–1.2)
CREAT SERPL-MCNC: 1.5 MG/DL (ref 0.6–1.2)
CREAT SERPL-MCNC: 1.6 MG/DL (ref 0.6–1.2)
CREAT SERPL-MCNC: 1.6 MG/DL (ref 0.6–1.2)
CREAT SERPL-MCNC: 1.7 MG/DL (ref 0.6–1.2)
CREAT SERPL-MCNC: 1.9 MG/DL (ref 0.6–1.2)
CULTURE, RESPIRATORY: ABNORMAL
EKG ATRIAL RATE: 52 BPM
EKG DIAGNOSIS: NORMAL
EKG DIAGNOSIS: NORMAL
EKG Q-T INTERVAL: 422 MS
EKG Q-T INTERVAL: 510 MS
EKG QRS DURATION: 98 MS
EKG QRS DURATION: 98 MS
EKG QTC CALCULATION (BAZETT): 436 MS
EKG QTC CALCULATION (BAZETT): 442 MS
EKG R AXIS: -50 DEGREES
EKG R AXIS: -53 DEGREES
EKG T AXIS: -47 DEGREES
EKG T AXIS: 103 DEGREES
EKG VENTRICULAR RATE: 44 BPM
EKG VENTRICULAR RATE: 66 BPM
EOSINOPHILS ABSOLUTE: 0 K/UL (ref 0–0.6)
EOSINOPHILS ABSOLUTE: 0 K/UL (ref 0–0.6)
EOSINOPHILS RELATIVE PERCENT: 0.1 %
EOSINOPHILS RELATIVE PERCENT: 1.3 %
FERRITIN: 123.5 NG/ML (ref 15–150)
FOLATE: >20 NG/ML (ref 4.78–24.2)
GFR AFRICAN AMERICAN: 30
GFR AFRICAN AMERICAN: 34
GFR AFRICAN AMERICAN: 37
GFR AFRICAN AMERICAN: 37
GFR AFRICAN AMERICAN: 40
GFR AFRICAN AMERICAN: 40
GFR AFRICAN AMERICAN: 43
GFR AFRICAN AMERICAN: 47
GFR AFRICAN AMERICAN: 47
GFR AFRICAN AMERICAN: 51
GFR AFRICAN AMERICAN: 51
GFR AFRICAN AMERICAN: 57
GFR NON-AFRICAN AMERICAN: 25
GFR NON-AFRICAN AMERICAN: 28
GFR NON-AFRICAN AMERICAN: 30
GFR NON-AFRICAN AMERICAN: 30
GFR NON-AFRICAN AMERICAN: 33
GFR NON-AFRICAN AMERICAN: 33
GFR NON-AFRICAN AMERICAN: 35
GFR NON-AFRICAN AMERICAN: 39
GFR NON-AFRICAN AMERICAN: 39
GFR NON-AFRICAN AMERICAN: 42
GFR NON-AFRICAN AMERICAN: 42
GFR NON-AFRICAN AMERICAN: 47
GLOBULIN: 3.3 G/DL
GLUCOSE BLD-MCNC: 108 MG/DL (ref 70–99)
GLUCOSE BLD-MCNC: 109 MG/DL (ref 70–99)
GLUCOSE BLD-MCNC: 124 MG/DL (ref 70–99)
GLUCOSE BLD-MCNC: 138 MG/DL (ref 70–99)
GLUCOSE BLD-MCNC: 62 MG/DL (ref 70–99)
GLUCOSE BLD-MCNC: 71 MG/DL (ref 70–99)
GLUCOSE BLD-MCNC: 72 MG/DL (ref 70–99)
GLUCOSE BLD-MCNC: 76 MG/DL (ref 70–99)
GLUCOSE BLD-MCNC: 76 MG/DL (ref 70–99)
GLUCOSE BLD-MCNC: 79 MG/DL (ref 70–99)
GLUCOSE BLD-MCNC: 79 MG/DL (ref 70–99)
GLUCOSE BLD-MCNC: 84 MG/DL (ref 70–99)
GLUCOSE BLD-MCNC: 84 MG/DL (ref 70–99)
GLUCOSE BLD-MCNC: 86 MG/DL (ref 70–99)
GLUCOSE BLD-MCNC: 91 MG/DL (ref 70–99)
GLUCOSE URINE: NEGATIVE MG/DL
GRAM STAIN RESULT: ABNORMAL
HCO3 VENOUS: 27 MMOL/L (ref 23–29)
HCT VFR BLD CALC: 29.2 % (ref 36–48)
HCT VFR BLD CALC: 29.5 % (ref 36–48)
HCT VFR BLD CALC: 30.1 % (ref 36–48)
HCT VFR BLD CALC: 30.3 % (ref 36–48)
HCT VFR BLD CALC: 31 % (ref 36–48)
HCT VFR BLD CALC: 31 % (ref 36–48)
HCT VFR BLD CALC: 31.9 % (ref 36–48)
HCT VFR BLD CALC: 32.5 % (ref 36–48)
HCT VFR BLD CALC: 33 % (ref 36–48)
HCT VFR BLD CALC: 33.8 % (ref 36–48)
HCT VFR BLD CALC: 34.8 % (ref 36–48)
HDLC SERPL-MCNC: 60 MG/DL (ref 40–60)
HEMOGLOBIN: 10 G/DL (ref 12–16)
HEMOGLOBIN: 10.4 G/DL (ref 12–16)
HEMOGLOBIN: 10.4 G/DL (ref 12–16)
HEMOGLOBIN: 10.6 G/DL (ref 12–16)
HEMOGLOBIN: 10.9 G/DL (ref 12–16)
HEMOGLOBIN: 11.1 G/DL (ref 12–16)
HEMOGLOBIN: 9.4 G/DL (ref 12–16)
HEMOGLOBIN: 9.6 G/DL (ref 12–16)
HEMOGLOBIN: 9.6 G/DL (ref 12–16)
HEMOGLOBIN: 9.7 G/DL (ref 12–16)
HEMOGLOBIN: 9.9 G/DL (ref 12–16)
IRON SATURATION: 11 % (ref 15–50)
IRON: 36 UG/DL (ref 37–145)
KETONES, URINE: NEGATIVE MG/DL
LACTIC ACID: 1.1 MMOL/L (ref 0.4–2)
LDL CHOLESTEROL CALCULATED: 56 MG/DL
LEUKOCYTE ESTERASE, URINE: NEGATIVE
LV EF: 40 %
LVEF MODALITY: NORMAL
LYMPHOCYTES ABSOLUTE: 0.7 K/UL (ref 1–5.1)
LYMPHOCYTES ABSOLUTE: 0.8 K/UL (ref 1–5.1)
LYMPHOCYTES RELATIVE PERCENT: 22.9 %
LYMPHOCYTES RELATIVE PERCENT: 7.6 %
MAGNESIUM: 1.9 MG/DL (ref 1.8–2.4)
MAGNESIUM: 1.9 MG/DL (ref 1.8–2.4)
MAGNESIUM: 2 MG/DL (ref 1.8–2.4)
MAGNESIUM: 2.1 MG/DL (ref 1.8–2.4)
MAGNESIUM: 2.2 MG/DL (ref 1.8–2.4)
MAGNESIUM: 2.2 MG/DL (ref 1.8–2.4)
MAGNESIUM: 2.3 MG/DL (ref 1.8–2.4)
MCH RBC QN AUTO: 26.7 PG (ref 26–34)
MCH RBC QN AUTO: 27.1 PG (ref 26–34)
MCH RBC QN AUTO: 27.4 PG (ref 26–34)
MCH RBC QN AUTO: 27.5 PG (ref 26–34)
MCH RBC QN AUTO: 27.6 PG (ref 26–34)
MCH RBC QN AUTO: 27.6 PG (ref 26–34)
MCH RBC QN AUTO: 27.7 PG (ref 26–34)
MCH RBC QN AUTO: 27.8 PG (ref 26–34)
MCH RBC QN AUTO: 28.1 PG (ref 26–34)
MCHC RBC AUTO-ENTMCNC: 31.9 G/DL (ref 31–36)
MCHC RBC AUTO-ENTMCNC: 31.9 G/DL (ref 31–36)
MCHC RBC AUTO-ENTMCNC: 32 G/DL (ref 31–36)
MCHC RBC AUTO-ENTMCNC: 32.2 G/DL (ref 31–36)
MCHC RBC AUTO-ENTMCNC: 32.3 G/DL (ref 31–36)
MCHC RBC AUTO-ENTMCNC: 32.4 G/DL (ref 31–36)
MCHC RBC AUTO-ENTMCNC: 32.7 G/DL (ref 31–36)
MCHC RBC AUTO-ENTMCNC: 32.9 G/DL (ref 31–36)
MCV RBC AUTO: 83.4 FL (ref 80–100)
MCV RBC AUTO: 83.9 FL (ref 80–100)
MCV RBC AUTO: 84.8 FL (ref 80–100)
MCV RBC AUTO: 85.1 FL (ref 80–100)
MCV RBC AUTO: 85.2 FL (ref 80–100)
MCV RBC AUTO: 85.3 FL (ref 80–100)
MCV RBC AUTO: 85.7 FL (ref 80–100)
MCV RBC AUTO: 85.9 FL (ref 80–100)
MCV RBC AUTO: 86.3 FL (ref 80–100)
MCV RBC AUTO: 86.5 FL (ref 80–100)
MCV RBC AUTO: 86.5 FL (ref 80–100)
METHEMOGLOBIN VENOUS: 1.1 %
MICROSCOPIC EXAMINATION: NORMAL
MONOCYTES ABSOLUTE: 0.4 K/UL (ref 0–1.3)
MONOCYTES ABSOLUTE: 0.5 K/UL (ref 0–1.3)
MONOCYTES RELATIVE PERCENT: 10.6 %
MONOCYTES RELATIVE PERCENT: 5.2 %
NEUTROPHILS ABSOLUTE: 2.3 K/UL (ref 1.7–7.7)
NEUTROPHILS ABSOLUTE: 8.4 K/UL (ref 1.7–7.7)
NEUTROPHILS RELATIVE PERCENT: 64.7 %
NEUTROPHILS RELATIVE PERCENT: 86.9 %
NITRITE, URINE: NEGATIVE
O2 CONTENT, VEN: 14 ML/DL
O2 SAT, VEN: 98 %
O2 THERAPY: ABNORMAL
ORGANISM: ABNORMAL
ORGANISM: ABNORMAL
PCO2, VEN: 50.7 MMHG (ref 40–50)
PDW BLD-RTO: 17.1 % (ref 12.4–15.4)
PDW BLD-RTO: 17.4 % (ref 12.4–15.4)
PDW BLD-RTO: 19.5 % (ref 12.4–15.4)
PDW BLD-RTO: 19.7 % (ref 12.4–15.4)
PDW BLD-RTO: 19.7 % (ref 12.4–15.4)
PDW BLD-RTO: 19.9 % (ref 12.4–15.4)
PDW BLD-RTO: 20.1 % (ref 12.4–15.4)
PDW BLD-RTO: 20.1 % (ref 12.4–15.4)
PDW BLD-RTO: 20.3 % (ref 12.4–15.4)
PDW BLD-RTO: 20.4 % (ref 12.4–15.4)
PDW BLD-RTO: 20.8 % (ref 12.4–15.4)
PH UA: 6
PH VENOUS: 7.34 (ref 7.35–7.45)
PLATELET # BLD: 107 K/UL (ref 135–450)
PLATELET # BLD: 108 K/UL (ref 135–450)
PLATELET # BLD: 109 K/UL (ref 135–450)
PLATELET # BLD: 110 K/UL (ref 135–450)
PLATELET # BLD: 112 K/UL (ref 135–450)
PLATELET # BLD: 115 K/UL (ref 135–450)
PLATELET # BLD: 118 K/UL (ref 135–450)
PLATELET # BLD: 138 K/UL (ref 135–450)
PLATELET # BLD: 156 K/UL (ref 135–450)
PLATELET # BLD: 175 K/UL (ref 135–450)
PLATELET # BLD: 192 K/UL (ref 135–450)
PMV BLD AUTO: 7.7 FL (ref 5–10.5)
PMV BLD AUTO: 7.7 FL (ref 5–10.5)
PMV BLD AUTO: 7.8 FL (ref 5–10.5)
PMV BLD AUTO: 7.8 FL (ref 5–10.5)
PMV BLD AUTO: 7.9 FL (ref 5–10.5)
PMV BLD AUTO: 8 FL (ref 5–10.5)
PMV BLD AUTO: 8.1 FL (ref 5–10.5)
PO2, VEN: 180 MMHG
POTASSIUM SERPL-SCNC: 3.7 MMOL/L (ref 3.5–5.1)
POTASSIUM SERPL-SCNC: 3.8 MMOL/L (ref 3.5–5.1)
POTASSIUM SERPL-SCNC: 4 MMOL/L (ref 3.5–5.1)
POTASSIUM SERPL-SCNC: 4.4 MMOL/L (ref 3.5–5.1)
POTASSIUM SERPL-SCNC: 4.5 MMOL/L (ref 3.5–5.1)
POTASSIUM SERPL-SCNC: 4.6 MMOL/L (ref 3.5–5.1)
POTASSIUM SERPL-SCNC: 4.7 MMOL/L (ref 3.5–5.1)
POTASSIUM SERPL-SCNC: 4.8 MMOL/L (ref 3.5–5.1)
POTASSIUM SERPL-SCNC: 4.9 MMOL/L (ref 3.5–5.1)
POTASSIUM SERPL-SCNC: 5.1 MMOL/L (ref 3.5–5.1)
POTASSIUM SERPL-SCNC: 5.1 MMOL/L (ref 3.5–5.1)
PRO-BNP: 1940 PG/ML (ref 0–449)
PRO-BNP: 2034 PG/ML (ref 0–449)
PRO-BNP: 2036 PG/ML (ref 0–449)
PRO-BNP: 2534 PG/ML (ref 0–449)
PRO-BNP: 2840 PG/ML (ref 0–449)
PRO-BNP: 3168 PG/ML (ref 0–449)
PRO-BNP: 3400 PG/ML (ref 0–449)
PRO-BNP: 3557 PG/ML (ref 0–449)
PROTEIN UA: NEGATIVE MG/DL
RBC # BLD: 3.42 M/UL (ref 4–5.2)
RBC # BLD: 3.44 M/UL (ref 4–5.2)
RBC # BLD: 3.58 M/UL (ref 4–5.2)
RBC # BLD: 3.6 M/UL (ref 4–5.2)
RBC # BLD: 3.6 M/UL (ref 4–5.2)
RBC # BLD: 3.64 M/UL (ref 4–5.2)
RBC # BLD: 3.79 M/UL (ref 4–5.2)
RBC # BLD: 3.8 M/UL (ref 4–5.2)
RBC # BLD: 3.81 M/UL (ref 4–5.2)
RBC # BLD: 3.96 M/UL (ref 4–5.2)
RBC # BLD: 4.02 M/UL (ref 4–5.2)
SODIUM BLD-SCNC: 134 MMOL/L (ref 136–145)
SODIUM BLD-SCNC: 134 MMOL/L (ref 136–145)
SODIUM BLD-SCNC: 138 MMOL/L (ref 136–145)
SODIUM BLD-SCNC: 139 MMOL/L (ref 136–145)
SODIUM BLD-SCNC: 139 MMOL/L (ref 136–145)
SODIUM BLD-SCNC: 140 MMOL/L (ref 136–145)
SODIUM BLD-SCNC: 141 MMOL/L (ref 136–145)
SODIUM BLD-SCNC: 142 MMOL/L (ref 136–145)
SODIUM BLD-SCNC: 142 MMOL/L (ref 136–145)
SODIUM BLD-SCNC: 143 MMOL/L (ref 136–145)
SPECIFIC GRAVITY UA: 1.01
T4 FREE: 1.4 NG/DL (ref 0.9–1.8)
TCO2 CALC VENOUS: 28 MMOL/L
TOTAL IRON BINDING CAPACITY: 314 UG/DL (ref 260–445)
TOTAL PROTEIN: 7 G/DL (ref 6.4–8.2)
TRIGL SERPL-MCNC: 83 MG/DL (ref 0–150)
TROPONIN: <0.01 NG/ML
TSH REFLEX FT4: 6 UIU/ML (ref 0.27–4.2)
TSH SERPL DL<=0.05 MIU/L-ACNC: 5.23 UIU/ML (ref 0.27–4.2)
URINE REFLEX TO CULTURE: NORMAL
URINE TYPE: NORMAL
UROBILINOGEN, URINE: 0.2 E.U./DL
VITAMIN B-12: 674 PG/ML (ref 211–911)
VLDLC SERPL CALC-MCNC: 17 MG/DL
WBC # BLD: 2.9 K/UL (ref 4–11)
WBC # BLD: 3.6 K/UL (ref 4–11)
WBC # BLD: 4 K/UL (ref 4–11)
WBC # BLD: 4.1 K/UL (ref 4–11)
WBC # BLD: 4.2 K/UL (ref 4–11)
WBC # BLD: 4.3 K/UL (ref 4–11)
WBC # BLD: 4.3 K/UL (ref 4–11)
WBC # BLD: 5.3 K/UL (ref 4–11)
WBC # BLD: 6 K/UL (ref 4–11)
WBC # BLD: 7.1 K/UL (ref 4–11)
WBC # BLD: 9.6 K/UL (ref 4–11)

## 2018-01-01 PROCEDURE — G8484 FLU IMMUNIZE NO ADMIN: HCPCS | Performed by: NURSE PRACTITIONER

## 2018-01-01 PROCEDURE — 74019 RADEX ABDOMEN 2 VIEWS: CPT

## 2018-01-01 PROCEDURE — 6370000000 HC RX 637 (ALT 250 FOR IP): Performed by: INTERNAL MEDICINE

## 2018-01-01 PROCEDURE — 1200000000 HC SEMI PRIVATE

## 2018-01-01 PROCEDURE — 94640 AIRWAY INHALATION TREATMENT: CPT

## 2018-01-01 PROCEDURE — G8988 SELF CARE GOAL STATUS: HCPCS

## 2018-01-01 PROCEDURE — 99233 SBSQ HOSP IP/OBS HIGH 50: CPT | Performed by: INTERNAL MEDICINE

## 2018-01-01 PROCEDURE — G8978 MOBILITY CURRENT STATUS: HCPCS

## 2018-01-01 PROCEDURE — 4040F PNEUMOC VAC/ADMIN/RCVD: CPT | Performed by: ORTHOPAEDIC SURGERY

## 2018-01-01 PROCEDURE — G8427 DOCREV CUR MEDS BY ELIG CLIN: HCPCS | Performed by: NURSE PRACTITIONER

## 2018-01-01 PROCEDURE — 1111F DSCHRG MED/CURRENT MED MERGE: CPT | Performed by: NURSE PRACTITIONER

## 2018-01-01 PROCEDURE — 80048 BASIC METABOLIC PNL TOTAL CA: CPT

## 2018-01-01 PROCEDURE — 6370000000 HC RX 637 (ALT 250 FOR IP): Performed by: FAMILY MEDICINE

## 2018-01-01 PROCEDURE — 85027 COMPLETE CBC AUTOMATED: CPT

## 2018-01-01 PROCEDURE — 94664 DEMO&/EVAL PT USE INHALER: CPT

## 2018-01-01 PROCEDURE — 6370000000 HC RX 637 (ALT 250 FOR IP): Performed by: HOSPITALIST

## 2018-01-01 PROCEDURE — 97535 SELF CARE MNGMENT TRAINING: CPT

## 2018-01-01 PROCEDURE — 1111F DSCHRG MED/CURRENT MED MERGE: CPT | Performed by: INTERNAL MEDICINE

## 2018-01-01 PROCEDURE — 2060000000 HC ICU INTERMEDIATE R&B

## 2018-01-01 PROCEDURE — 1101F PT FALLS ASSESS-DOCD LE1/YR: CPT | Performed by: ORTHOPAEDIC SURGERY

## 2018-01-01 PROCEDURE — 2700000000 HC OXYGEN THERAPY PER DAY

## 2018-01-01 PROCEDURE — 99223 1ST HOSP IP/OBS HIGH 75: CPT | Performed by: INTERNAL MEDICINE

## 2018-01-01 PROCEDURE — G8979 MOBILITY GOAL STATUS: HCPCS

## 2018-01-01 PROCEDURE — 2580000003 HC RX 258: Performed by: FAMILY MEDICINE

## 2018-01-01 PROCEDURE — 97530 THERAPEUTIC ACTIVITIES: CPT

## 2018-01-01 PROCEDURE — 93306 TTE W/DOPPLER COMPLETE: CPT

## 2018-01-01 PROCEDURE — 99291 CRITICAL CARE FIRST HOUR: CPT

## 2018-01-01 PROCEDURE — 2140000000 HC CCU INTERMEDIATE R&B

## 2018-01-01 PROCEDURE — 94760 N-INVAS EAR/PLS OXIMETRY 1: CPT

## 2018-01-01 PROCEDURE — 83880 ASSAY OF NATRIURETIC PEPTIDE: CPT

## 2018-01-01 PROCEDURE — 6360000002 HC RX W HCPCS: Performed by: INTERNAL MEDICINE

## 2018-01-01 PROCEDURE — 94762 N-INVAS EAR/PLS OXIMTRY CONT: CPT

## 2018-01-01 PROCEDURE — 6370000000 HC RX 637 (ALT 250 FOR IP): Performed by: NURSE PRACTITIONER

## 2018-01-01 PROCEDURE — 83735 ASSAY OF MAGNESIUM: CPT

## 2018-01-01 PROCEDURE — 71045 X-RAY EXAM CHEST 1 VIEW: CPT

## 2018-01-01 PROCEDURE — 93000 ELECTROCARDIOGRAM COMPLETE: CPT | Performed by: NURSE PRACTITIONER

## 2018-01-01 PROCEDURE — 1090F PRES/ABSN URINE INCON ASSESS: CPT | Performed by: NURSE PRACTITIONER

## 2018-01-01 PROCEDURE — 1123F ACP DISCUSS/DSCN MKR DOCD: CPT | Performed by: ORTHOPAEDIC SURGERY

## 2018-01-01 PROCEDURE — 36415 COLL VENOUS BLD VENIPUNCTURE: CPT

## 2018-01-01 PROCEDURE — 82746 ASSAY OF FOLIC ACID SERUM: CPT

## 2018-01-01 PROCEDURE — 93010 ELECTROCARDIOGRAM REPORT: CPT | Performed by: INTERNAL MEDICINE

## 2018-01-01 PROCEDURE — 4040F PNEUMOC VAC/ADMIN/RCVD: CPT | Performed by: NURSE PRACTITIONER

## 2018-01-01 PROCEDURE — 1036F TOBACCO NON-USER: CPT | Performed by: NURSE PRACTITIONER

## 2018-01-01 PROCEDURE — 97162 PT EVAL MOD COMPLEX 30 MIN: CPT

## 2018-01-01 PROCEDURE — 4040F PNEUMOC VAC/ADMIN/RCVD: CPT | Performed by: INTERNAL MEDICINE

## 2018-01-01 PROCEDURE — 82607 VITAMIN B-12: CPT

## 2018-01-01 PROCEDURE — 87186 SC STD MICRODIL/AGAR DIL: CPT

## 2018-01-01 PROCEDURE — 1036F TOBACCO NON-USER: CPT | Performed by: ORTHOPAEDIC SURGERY

## 2018-01-01 PROCEDURE — 1101F PT FALLS ASSESS-DOCD LE1/YR: CPT | Performed by: NURSE PRACTITIONER

## 2018-01-01 PROCEDURE — 97116 GAIT TRAINING THERAPY: CPT

## 2018-01-01 PROCEDURE — 1036F TOBACCO NON-USER: CPT | Performed by: INTERNAL MEDICINE

## 2018-01-01 PROCEDURE — G8420 CALC BMI NORM PARAMETERS: HCPCS | Performed by: INTERNAL MEDICINE

## 2018-01-01 PROCEDURE — 80053 COMPREHEN METABOLIC PANEL: CPT

## 2018-01-01 PROCEDURE — 99213 OFFICE O/P EST LOW 20 MIN: CPT | Performed by: ORTHOPAEDIC SURGERY

## 2018-01-01 PROCEDURE — 93005 ELECTROCARDIOGRAM TRACING: CPT | Performed by: PHYSICIAN ASSISTANT

## 2018-01-01 PROCEDURE — 1090F PRES/ABSN URINE INCON ASSESS: CPT | Performed by: ORTHOPAEDIC SURGERY

## 2018-01-01 PROCEDURE — 97530 THERAPEUTIC ACTIVITIES: CPT | Performed by: PHYSICAL THERAPIST

## 2018-01-01 PROCEDURE — 93000 ELECTROCARDIOGRAM COMPLETE: CPT | Performed by: INTERNAL MEDICINE

## 2018-01-01 PROCEDURE — 2100000000 HC CCU R&B

## 2018-01-01 PROCEDURE — 2580000003 HC RX 258: Performed by: INTERNAL MEDICINE

## 2018-01-01 PROCEDURE — 1101F PT FALLS ASSESS-DOCD LE1/YR: CPT | Performed by: INTERNAL MEDICINE

## 2018-01-01 PROCEDURE — 97116 GAIT TRAINING THERAPY: CPT | Performed by: PHYSICAL THERAPIST

## 2018-01-01 PROCEDURE — 6360000002 HC RX W HCPCS: Performed by: HOSPITALIST

## 2018-01-01 PROCEDURE — G8987 SELF CARE CURRENT STATUS: HCPCS

## 2018-01-01 PROCEDURE — 2580000003 HC RX 258: Performed by: HOSPITALIST

## 2018-01-01 PROCEDURE — 1111F DSCHRG MED/CURRENT MED MERGE: CPT | Performed by: FAMILY MEDICINE

## 2018-01-01 PROCEDURE — 83605 ASSAY OF LACTIC ACID: CPT

## 2018-01-01 PROCEDURE — 82803 BLOOD GASES ANY COMBINATION: CPT

## 2018-01-01 PROCEDURE — G8420 CALC BMI NORM PARAMETERS: HCPCS | Performed by: NURSE PRACTITIONER

## 2018-01-01 PROCEDURE — 1123F ACP DISCUSS/DSCN MKR DOCD: CPT | Performed by: NURSE PRACTITIONER

## 2018-01-01 PROCEDURE — 51702 INSERT TEMP BLADDER CATH: CPT

## 2018-01-01 PROCEDURE — 2500000003 HC RX 250 WO HCPCS: Performed by: EMERGENCY MEDICINE

## 2018-01-01 PROCEDURE — G8420 CALC BMI NORM PARAMETERS: HCPCS | Performed by: ORTHOPAEDIC SURGERY

## 2018-01-01 PROCEDURE — 6360000002 HC RX W HCPCS: Performed by: NURSE PRACTITIONER

## 2018-01-01 PROCEDURE — 84443 ASSAY THYROID STIM HORMONE: CPT

## 2018-01-01 PROCEDURE — G8926 SPIRO NO PERF OR DOC: HCPCS | Performed by: NURSE PRACTITIONER

## 2018-01-01 PROCEDURE — 84484 ASSAY OF TROPONIN QUANT: CPT

## 2018-01-01 PROCEDURE — 99233 SBSQ HOSP IP/OBS HIGH 50: CPT | Performed by: NURSE PRACTITIONER

## 2018-01-01 PROCEDURE — 1123F ACP DISCUSS/DSCN MKR DOCD: CPT | Performed by: INTERNAL MEDICINE

## 2018-01-01 PROCEDURE — 94761 N-INVAS EAR/PLS OXIMETRY MLT: CPT

## 2018-01-01 PROCEDURE — 2580000003 HC RX 258

## 2018-01-01 PROCEDURE — 96361 HYDRATE IV INFUSION ADD-ON: CPT

## 2018-01-01 PROCEDURE — 6360000002 HC RX W HCPCS: Performed by: FAMILY MEDICINE

## 2018-01-01 PROCEDURE — 99285 EMERGENCY DEPT VISIT HI MDM: CPT

## 2018-01-01 PROCEDURE — 99214 OFFICE O/P EST MOD 30 MIN: CPT | Performed by: INTERNAL MEDICINE

## 2018-01-01 PROCEDURE — 87077 CULTURE AEROBIC IDENTIFY: CPT

## 2018-01-01 PROCEDURE — G8427 DOCREV CUR MEDS BY ELIG CLIN: HCPCS | Performed by: INTERNAL MEDICINE

## 2018-01-01 PROCEDURE — 97110 THERAPEUTIC EXERCISES: CPT

## 2018-01-01 PROCEDURE — 82728 ASSAY OF FERRITIN: CPT

## 2018-01-01 PROCEDURE — 3023F SPIROM DOC REV: CPT | Performed by: NURSE PRACTITIONER

## 2018-01-01 PROCEDURE — 85025 COMPLETE CBC W/AUTO DIFF WBC: CPT

## 2018-01-01 PROCEDURE — 83540 ASSAY OF IRON: CPT

## 2018-01-01 PROCEDURE — 99214 OFFICE O/P EST MOD 30 MIN: CPT | Performed by: NURSE PRACTITIONER

## 2018-01-01 PROCEDURE — 84439 ASSAY OF FREE THYROXINE: CPT

## 2018-01-01 PROCEDURE — 1090F PRES/ABSN URINE INCON ASSESS: CPT | Performed by: INTERNAL MEDICINE

## 2018-01-01 PROCEDURE — G8417 CALC BMI ABV UP PARAM F/U: HCPCS | Performed by: NURSE PRACTITIONER

## 2018-01-01 PROCEDURE — 6360000002 HC RX W HCPCS: Performed by: PHYSICIAN ASSISTANT

## 2018-01-01 PROCEDURE — 87070 CULTURE OTHR SPECIMN AEROBIC: CPT

## 2018-01-01 PROCEDURE — 96374 THER/PROPH/DIAG INJ IV PUSH: CPT

## 2018-01-01 PROCEDURE — 80061 LIPID PANEL: CPT

## 2018-01-01 PROCEDURE — 83550 IRON BINDING TEST: CPT

## 2018-01-01 PROCEDURE — 6370000000 HC RX 637 (ALT 250 FOR IP): Performed by: PHYSICIAN ASSISTANT

## 2018-01-01 PROCEDURE — 81003 URINALYSIS AUTO W/O SCOPE: CPT

## 2018-01-01 PROCEDURE — 97110 THERAPEUTIC EXERCISES: CPT | Performed by: PHYSICAL THERAPIST

## 2018-01-01 PROCEDURE — 99213 OFFICE O/P EST LOW 20 MIN: CPT | Performed by: NURSE PRACTITIONER

## 2018-01-01 PROCEDURE — 87205 SMEAR GRAM STAIN: CPT

## 2018-01-01 PROCEDURE — G8427 DOCREV CUR MEDS BY ELIG CLIN: HCPCS | Performed by: ORTHOPAEDIC SURGERY

## 2018-01-01 PROCEDURE — 97166 OT EVAL MOD COMPLEX 45 MIN: CPT

## 2018-01-01 PROCEDURE — 6370000000 HC RX 637 (ALT 250 FOR IP): Performed by: EMERGENCY MEDICINE

## 2018-01-01 PROCEDURE — 74176 CT ABD & PELVIS W/O CONTRAST: CPT

## 2018-01-01 PROCEDURE — 71250 CT THORAX DX C-: CPT

## 2018-01-01 PROCEDURE — 2580000003 HC RX 258: Performed by: EMERGENCY MEDICINE

## 2018-01-01 PROCEDURE — 93005 ELECTROCARDIOGRAM TRACING: CPT | Performed by: EMERGENCY MEDICINE

## 2018-01-01 RX ORDER — MIDODRINE HYDROCHLORIDE 5 MG/1
5 TABLET ORAL
Qty: 90 TABLET | Refills: 0 | Status: SHIPPED | OUTPATIENT
Start: 2018-01-01

## 2018-01-01 RX ORDER — TORSEMIDE 20 MG/1
20 TABLET ORAL SEE ADMIN INSTRUCTIONS
Qty: 30 TABLET | Refills: 3 | Status: SHIPPED | OUTPATIENT
Start: 2018-01-01 | End: 2018-01-01 | Stop reason: SDUPTHER

## 2018-01-01 RX ORDER — PANTOPRAZOLE SODIUM 40 MG/1
40 TABLET, DELAYED RELEASE ORAL DAILY
COMMUNITY
End: 2018-01-01 | Stop reason: ALTCHOICE

## 2018-01-01 RX ORDER — ACETAMINOPHEN 325 MG/1
325 TABLET ORAL EVERY 4 HOURS PRN
Status: DISCONTINUED | OUTPATIENT
Start: 2018-01-01 | End: 2018-01-01 | Stop reason: HOSPADM

## 2018-01-01 RX ORDER — GUAIFENESIN 600 MG/1
1200 TABLET, EXTENDED RELEASE ORAL 2 TIMES DAILY PRN
Status: DISCONTINUED | OUTPATIENT
Start: 2018-01-01 | End: 2018-01-01 | Stop reason: HOSPADM

## 2018-01-01 RX ORDER — IPRATROPIUM BROMIDE AND ALBUTEROL SULFATE 2.5; .5 MG/3ML; MG/3ML
1 SOLUTION RESPIRATORY (INHALATION)
Status: DISCONTINUED | OUTPATIENT
Start: 2018-01-01 | End: 2018-01-01 | Stop reason: HOSPADM

## 2018-01-01 RX ORDER — DOCUSATE SODIUM 100 MG/1
100 CAPSULE, LIQUID FILLED ORAL DAILY
Status: DISCONTINUED | OUTPATIENT
Start: 2018-01-01 | End: 2018-01-01 | Stop reason: HOSPADM

## 2018-01-01 RX ORDER — DOCUSATE SODIUM 100 MG/1
100 CAPSULE, LIQUID FILLED ORAL DAILY
Status: ON HOLD | COMMUNITY
End: 2019-01-01 | Stop reason: HOSPADM

## 2018-01-01 RX ORDER — SODIUM CHLORIDE 0.9 % (FLUSH) 0.9 %
10 SYRINGE (ML) INJECTION EVERY 12 HOURS SCHEDULED
Status: DISCONTINUED | OUTPATIENT
Start: 2018-01-01 | End: 2018-01-01 | Stop reason: HOSPADM

## 2018-01-01 RX ORDER — HEPARIN SODIUM 5000 [USP'U]/ML
5000 INJECTION, SOLUTION INTRAVENOUS; SUBCUTANEOUS 2 TIMES DAILY
Status: DISCONTINUED | OUTPATIENT
Start: 2018-01-01 | End: 2018-01-01 | Stop reason: HOSPADM

## 2018-01-01 RX ORDER — TORSEMIDE 10 MG/1
20 TABLET ORAL ONCE
Status: COMPLETED | OUTPATIENT
Start: 2018-01-01 | End: 2018-01-01

## 2018-01-01 RX ORDER — CALCIUM CARBONATE/VITAMIN D3 250-3.125
1 TABLET ORAL DAILY
Status: DISCONTINUED | OUTPATIENT
Start: 2018-01-01 | End: 2018-01-01 | Stop reason: HOSPADM

## 2018-01-01 RX ORDER — TORSEMIDE 20 MG/1
TABLET ORAL
Qty: 50 TABLET | Refills: 3 | Status: ON HOLD | OUTPATIENT
Start: 2018-01-01 | End: 2019-01-01 | Stop reason: HOSPADM

## 2018-01-01 RX ORDER — B-COMPLEX WITH VITAMIN C
1 TABLET ORAL DAILY
Status: DISCONTINUED | OUTPATIENT
Start: 2018-01-01 | End: 2018-01-01 | Stop reason: HOSPADM

## 2018-01-01 RX ORDER — IPRATROPIUM BROMIDE AND ALBUTEROL SULFATE 2.5; .5 MG/3ML; MG/3ML
1 SOLUTION RESPIRATORY (INHALATION) EVERY 4 HOURS
Status: DISCONTINUED | OUTPATIENT
Start: 2018-01-01 | End: 2018-01-01

## 2018-01-01 RX ORDER — TORSEMIDE 20 MG/1
20 TABLET ORAL DAILY
Status: DISCONTINUED | OUTPATIENT
Start: 2018-01-01 | End: 2018-01-01

## 2018-01-01 RX ORDER — MIDODRINE HYDROCHLORIDE 5 MG/1
5 TABLET ORAL
Status: DISCONTINUED | OUTPATIENT
Start: 2018-01-01 | End: 2018-01-01 | Stop reason: HOSPADM

## 2018-01-01 RX ORDER — HYDROCODONE BITARTRATE AND ACETAMINOPHEN 5; 325 MG/1; MG/1
1 TABLET ORAL ONCE
Status: COMPLETED | OUTPATIENT
Start: 2018-01-01 | End: 2018-01-01

## 2018-01-01 RX ORDER — CETIRIZINE HYDROCHLORIDE 10 MG/1
5 TABLET ORAL DAILY
Status: DISCONTINUED | OUTPATIENT
Start: 2018-01-01 | End: 2018-01-01 | Stop reason: HOSPADM

## 2018-01-01 RX ORDER — HEPARIN SODIUM 5000 [USP'U]/ML
5000 INJECTION, SOLUTION INTRAVENOUS; SUBCUTANEOUS ONCE
COMMUNITY
End: 2018-01-01 | Stop reason: ALTCHOICE

## 2018-01-01 RX ORDER — FUROSEMIDE 10 MG/ML
40 INJECTION INTRAMUSCULAR; INTRAVENOUS DAILY
Status: DISCONTINUED | OUTPATIENT
Start: 2018-01-01 | End: 2018-01-01

## 2018-01-01 RX ORDER — SULFAMETHOXAZOLE AND TRIMETHOPRIM 800; 160 MG/1; MG/1
0.5 TABLET ORAL EVERY 12 HOURS SCHEDULED
Status: DISCONTINUED | OUTPATIENT
Start: 2018-01-01 | End: 2018-01-01 | Stop reason: HOSPADM

## 2018-01-01 RX ORDER — HYDROCODONE BITARTRATE AND ACETAMINOPHEN 5; 325 MG/1; MG/1
1 TABLET ORAL EVERY 8 HOURS PRN
Status: DISCONTINUED | OUTPATIENT
Start: 2018-01-01 | End: 2018-01-01 | Stop reason: HOSPADM

## 2018-01-01 RX ORDER — LISINOPRIL 5 MG/1
5 TABLET ORAL DAILY
Status: CANCELLED | OUTPATIENT
Start: 2018-01-01

## 2018-01-01 RX ORDER — SULFAMETHOXAZOLE AND TRIMETHOPRIM 800; 160 MG/1; MG/1
0.5 TABLET ORAL EVERY 12 HOURS SCHEDULED
Qty: 4 TABLET | Refills: 0 | Status: SHIPPED | OUTPATIENT
Start: 2018-01-01 | End: 2018-01-01

## 2018-01-01 RX ORDER — TORSEMIDE 20 MG/1
20 TABLET ORAL EVERY EVENING
Status: DISCONTINUED | OUTPATIENT
Start: 2018-01-01 | End: 2018-01-01 | Stop reason: HOSPADM

## 2018-01-01 RX ORDER — FERROUS SULFATE TAB EC 324 MG (65 MG FE EQUIVALENT) 324 (65 FE) MG
324 TABLET DELAYED RESPONSE ORAL
Status: DISCONTINUED | OUTPATIENT
Start: 2018-01-01 | End: 2018-01-01 | Stop reason: HOSPADM

## 2018-01-01 RX ORDER — LISINOPRIL 5 MG/1
5 TABLET ORAL DAILY
Qty: 30 TABLET | Refills: 3 | Status: ON HOLD | OUTPATIENT
Start: 2018-01-01 | End: 2018-01-01 | Stop reason: ALTCHOICE

## 2018-01-01 RX ORDER — ALBUTEROL SULFATE 90 UG/1
2 AEROSOL, METERED RESPIRATORY (INHALATION) EVERY 4 HOURS PRN
Status: CANCELLED | OUTPATIENT
Start: 2018-01-01

## 2018-01-01 RX ORDER — ONDANSETRON 2 MG/ML
4 INJECTION INTRAMUSCULAR; INTRAVENOUS EVERY 6 HOURS PRN
Status: DISCONTINUED | OUTPATIENT
Start: 2018-01-01 | End: 2018-01-01 | Stop reason: HOSPADM

## 2018-01-01 RX ORDER — SODIUM CHLORIDE 0.9 % (FLUSH) 0.9 %
10 SYRINGE (ML) INJECTION PRN
Status: DISCONTINUED | OUTPATIENT
Start: 2018-01-01 | End: 2018-01-01 | Stop reason: HOSPADM

## 2018-01-01 RX ORDER — METRONIDAZOLE 7.5 MG/G
GEL TOPICAL 3 TIMES DAILY
Status: DISCONTINUED | OUTPATIENT
Start: 2018-01-01 | End: 2018-01-01

## 2018-01-01 RX ORDER — TORSEMIDE 20 MG/1
20 TABLET ORAL
Status: DISCONTINUED | OUTPATIENT
Start: 2018-01-01 | End: 2018-01-01 | Stop reason: HOSPADM

## 2018-01-01 RX ORDER — PANTOPRAZOLE SODIUM 40 MG/1
40 TABLET, DELAYED RELEASE ORAL
Status: DISCONTINUED | OUTPATIENT
Start: 2018-01-01 | End: 2018-01-01 | Stop reason: HOSPADM

## 2018-01-01 RX ORDER — HYDROCODONE BITARTRATE AND ACETAMINOPHEN 5; 325 MG/1; MG/1
1 TABLET ORAL EVERY 8 HOURS PRN
Status: DISCONTINUED | OUTPATIENT
Start: 2018-01-01 | End: 2018-01-01

## 2018-01-01 RX ORDER — FUROSEMIDE 10 MG/ML
20 INJECTION INTRAMUSCULAR; INTRAVENOUS 2 TIMES DAILY
Status: DISCONTINUED | OUTPATIENT
Start: 2018-01-01 | End: 2018-01-01

## 2018-01-01 RX ORDER — TIZANIDINE 2 MG/1
2 TABLET ORAL EVERY 8 HOURS PRN
Status: ON HOLD | COMMUNITY
End: 2018-01-01 | Stop reason: HOSPADM

## 2018-01-01 RX ORDER — IPRATROPIUM BROMIDE AND ALBUTEROL SULFATE 2.5; .5 MG/3ML; MG/3ML
1 SOLUTION RESPIRATORY (INHALATION) ONCE
Status: COMPLETED | OUTPATIENT
Start: 2018-01-01 | End: 2018-01-01

## 2018-01-01 RX ORDER — MIDODRINE HYDROCHLORIDE 5 MG/1
2.5 TABLET ORAL
Status: DISCONTINUED | OUTPATIENT
Start: 2018-01-01 | End: 2018-01-01

## 2018-01-01 RX ORDER — SODIUM CHLORIDE 9 MG/ML
INJECTION, SOLUTION INTRAVENOUS
Status: COMPLETED
Start: 2018-01-01 | End: 2018-01-01

## 2018-01-01 RX ORDER — HYDROCODONE BITARTRATE AND ACETAMINOPHEN 5; 325 MG/1; MG/1
1 TABLET ORAL EVERY 6 HOURS PRN
Status: DISCONTINUED | OUTPATIENT
Start: 2018-01-01 | End: 2018-01-01 | Stop reason: HOSPADM

## 2018-01-01 RX ORDER — TRAMADOL HYDROCHLORIDE 50 MG/1
50 TABLET ORAL EVERY 6 HOURS PRN
Status: DISCONTINUED | OUTPATIENT
Start: 2018-01-01 | End: 2018-01-01 | Stop reason: HOSPADM

## 2018-01-01 RX ORDER — BACITRACIN ZINC AND POLYMYXIN B SULFATE 500; 1000 [USP'U]/G; [USP'U]/G
OINTMENT TOPICAL DAILY
Status: DISCONTINUED | OUTPATIENT
Start: 2018-01-01 | End: 2018-01-01 | Stop reason: HOSPADM

## 2018-01-01 RX ORDER — FUROSEMIDE 10 MG/ML
40 INJECTION INTRAMUSCULAR; INTRAVENOUS 2 TIMES DAILY
Status: DISCONTINUED | OUTPATIENT
Start: 2018-01-01 | End: 2018-01-01

## 2018-01-01 RX ORDER — 0.9 % SODIUM CHLORIDE 0.9 %
500 INTRAVENOUS SOLUTION INTRAVENOUS ONCE
Status: COMPLETED | OUTPATIENT
Start: 2018-01-01 | End: 2018-01-01

## 2018-01-01 RX ORDER — METOPROLOL SUCCINATE 25 MG/1
25 TABLET, EXTENDED RELEASE ORAL DAILY
Status: DISCONTINUED | OUTPATIENT
Start: 2018-01-01 | End: 2018-01-01 | Stop reason: HOSPADM

## 2018-01-01 RX ORDER — LEVOTHYROXINE SODIUM 0.1 MG/1
100 TABLET ORAL DAILY
Status: DISCONTINUED | OUTPATIENT
Start: 2018-01-01 | End: 2018-01-01 | Stop reason: HOSPADM

## 2018-01-01 RX ORDER — LISINOPRIL 5 MG/1
5 TABLET ORAL DAILY
Status: DISCONTINUED | OUTPATIENT
Start: 2018-01-01 | End: 2018-01-01

## 2018-01-01 RX ORDER — IPRATROPIUM BROMIDE AND ALBUTEROL SULFATE 2.5; .5 MG/3ML; MG/3ML
1 SOLUTION RESPIRATORY (INHALATION) EVERY 4 HOURS PRN
Status: DISCONTINUED | OUTPATIENT
Start: 2018-01-01 | End: 2018-01-01

## 2018-01-01 RX ORDER — METOPROLOL SUCCINATE 25 MG/1
25 TABLET, EXTENDED RELEASE ORAL DAILY
Qty: 30 TABLET | Refills: 3 | Status: ON HOLD | OUTPATIENT
Start: 2018-01-01 | End: 2018-01-01 | Stop reason: HOSPADM

## 2018-01-01 RX ORDER — BISACODYL 10 MG
10 SUPPOSITORY, RECTAL RECTAL ONCE
Status: COMPLETED | OUTPATIENT
Start: 2018-01-01 | End: 2018-01-01

## 2018-01-01 RX ORDER — METHYLPREDNISOLONE SODIUM SUCCINATE 125 MG/2ML
125 INJECTION, POWDER, LYOPHILIZED, FOR SOLUTION INTRAMUSCULAR; INTRAVENOUS ONCE
Status: COMPLETED | OUTPATIENT
Start: 2018-01-01 | End: 2018-01-01

## 2018-01-01 RX ORDER — GUAIFENESIN 600 MG/1
600 TABLET, EXTENDED RELEASE ORAL 2 TIMES DAILY
Status: DISCONTINUED | OUTPATIENT
Start: 2018-01-01 | End: 2018-01-01 | Stop reason: HOSPADM

## 2018-01-01 RX ORDER — ALBUTEROL SULFATE 90 UG/1
2 AEROSOL, METERED RESPIRATORY (INHALATION) EVERY 4 HOURS PRN
Status: DISCONTINUED | OUTPATIENT
Start: 2018-01-01 | End: 2018-01-01 | Stop reason: HOSPADM

## 2018-01-01 RX ORDER — FUROSEMIDE 10 MG/ML
60 INJECTION INTRAMUSCULAR; INTRAVENOUS 2 TIMES DAILY
Status: DISCONTINUED | OUTPATIENT
Start: 2018-01-01 | End: 2018-01-01 | Stop reason: HOSPADM

## 2018-01-01 RX ORDER — METOPROLOL SUCCINATE 25 MG/1
25 TABLET, EXTENDED RELEASE ORAL DAILY
Status: CANCELLED | OUTPATIENT
Start: 2018-01-01

## 2018-01-01 RX ADMIN — HYDROCODONE BITARTRATE AND ACETAMINOPHEN 1 TABLET: 5; 325 TABLET ORAL at 15:44

## 2018-01-01 RX ADMIN — MOMETASONE FUROATE AND FORMOTEROL FUMARATE DIHYDRATE 2 PUFF: 200; 5 AEROSOL RESPIRATORY (INHALATION) at 20:51

## 2018-01-01 RX ADMIN — IPRATROPIUM BROMIDE AND ALBUTEROL SULFATE 1 AMPULE: .5; 3 SOLUTION RESPIRATORY (INHALATION) at 07:58

## 2018-01-01 RX ADMIN — BACITRACIN ZINC AND POLYMYXIN B SULFATE: at 19:40

## 2018-01-01 RX ADMIN — MIDODRINE HYDROCHLORIDE 5 MG: 5 TABLET ORAL at 17:27

## 2018-01-01 RX ADMIN — DOCUSATE SODIUM 100 MG: 100 CAPSULE, LIQUID FILLED ORAL at 08:59

## 2018-01-01 RX ADMIN — Medication 10 ML: at 22:26

## 2018-01-01 RX ADMIN — CEFTRIAXONE 1 G: 1 INJECTION, POWDER, FOR SOLUTION INTRAMUSCULAR; INTRAVENOUS at 14:53

## 2018-01-01 RX ADMIN — FUROSEMIDE 60 MG: 10 INJECTION, SOLUTION INTRAMUSCULAR; INTRAVENOUS at 18:07

## 2018-01-01 RX ADMIN — ENOXAPARIN SODIUM 30 MG: 30 INJECTION SUBCUTANEOUS at 09:00

## 2018-01-01 RX ADMIN — CETIRIZINE HYDROCHLORIDE 5 MG: 10 TABLET, FILM COATED ORAL at 08:22

## 2018-01-01 RX ADMIN — HYDROCODONE BITARTRATE AND ACETAMINOPHEN 1 TABLET: 5; 325 TABLET ORAL at 09:04

## 2018-01-01 RX ADMIN — IPRATROPIUM BROMIDE AND ALBUTEROL SULFATE 1 AMPULE: .5; 3 SOLUTION RESPIRATORY (INHALATION) at 16:28

## 2018-01-01 RX ADMIN — HYDROCODONE BITARTRATE AND ACETAMINOPHEN 1 TABLET: 5; 325 TABLET ORAL at 14:38

## 2018-01-01 RX ADMIN — TRAMADOL HYDROCHLORIDE 50 MG: 50 TABLET, FILM COATED ORAL at 19:02

## 2018-01-01 RX ADMIN — HYDROCODONE BITARTRATE AND ACETAMINOPHEN 1 TABLET: 5; 325 TABLET ORAL at 00:28

## 2018-01-01 RX ADMIN — MOMETASONE FUROATE AND FORMOTEROL FUMARATE DIHYDRATE 2 PUFF: 200; 5 AEROSOL RESPIRATORY (INHALATION) at 20:23

## 2018-01-01 RX ADMIN — Medication 10 ML: at 09:43

## 2018-01-01 RX ADMIN — TIOTROPIUM BROMIDE 18 MCG: 18 CAPSULE ORAL; RESPIRATORY (INHALATION) at 08:05

## 2018-01-01 RX ADMIN — HEPARIN SODIUM 5000 UNITS: 5000 INJECTION INTRAVENOUS; SUBCUTANEOUS at 21:08

## 2018-01-01 RX ADMIN — HEPARIN SODIUM 5000 UNITS: 5000 INJECTION INTRAVENOUS; SUBCUTANEOUS at 20:44

## 2018-01-01 RX ADMIN — GUAIFENESIN 600 MG: 600 TABLET, EXTENDED RELEASE ORAL at 08:22

## 2018-01-01 RX ADMIN — CETIRIZINE HYDROCHLORIDE 5 MG: 10 TABLET, FILM COATED ORAL at 10:08

## 2018-01-01 RX ADMIN — Medication 10 ML: at 09:15

## 2018-01-01 RX ADMIN — CALCIUM CARBONATE-CHOLECALCIFEROL TAB 250 MG-125 UNIT 250 MG: 250-125 TAB at 08:22

## 2018-01-01 RX ADMIN — IPRATROPIUM BROMIDE AND ALBUTEROL SULFATE 1 AMPULE: .5; 3 SOLUTION RESPIRATORY (INHALATION) at 15:44

## 2018-01-01 RX ADMIN — ENOXAPARIN SODIUM 30 MG: 30 INJECTION SUBCUTANEOUS at 09:13

## 2018-01-01 RX ADMIN — FUROSEMIDE 20 MG: 10 INJECTION, SOLUTION INTRAMUSCULAR; INTRAVENOUS at 08:43

## 2018-01-01 RX ADMIN — LEVOTHYROXINE SODIUM 100 MCG: 100 TABLET ORAL at 08:40

## 2018-01-01 RX ADMIN — ENOXAPARIN SODIUM 30 MG: 30 INJECTION SUBCUTANEOUS at 08:50

## 2018-01-01 RX ADMIN — Medication 10 ML: at 21:31

## 2018-01-01 RX ADMIN — IPRATROPIUM BROMIDE AND ALBUTEROL SULFATE 1 AMPULE: .5; 3 SOLUTION RESPIRATORY (INHALATION) at 21:20

## 2018-01-01 RX ADMIN — IPRATROPIUM BROMIDE AND ALBUTEROL SULFATE 1 AMPULE: .5; 3 SOLUTION RESPIRATORY (INHALATION) at 11:49

## 2018-01-01 RX ADMIN — HYDROCODONE BITARTRATE AND ACETAMINOPHEN 1 TABLET: 5; 325 TABLET ORAL at 00:00

## 2018-01-01 RX ADMIN — IPRATROPIUM BROMIDE AND ALBUTEROL SULFATE 1 AMPULE: .5; 3 SOLUTION RESPIRATORY (INHALATION) at 21:01

## 2018-01-01 RX ADMIN — MOMETASONE FUROATE AND FORMOTEROL FUMARATE DIHYDRATE 2 PUFF: 200; 5 AEROSOL RESPIRATORY (INHALATION) at 08:49

## 2018-01-01 RX ADMIN — MIDODRINE HYDROCHLORIDE 5 MG: 5 TABLET ORAL at 13:31

## 2018-01-01 RX ADMIN — DOCUSATE SODIUM 100 MG: 100 CAPSULE, LIQUID FILLED ORAL at 09:04

## 2018-01-01 RX ADMIN — GUAIFENESIN 600 MG: 600 TABLET, EXTENDED RELEASE ORAL at 21:31

## 2018-01-01 RX ADMIN — HEPARIN SODIUM 5000 UNITS: 5000 INJECTION INTRAVENOUS; SUBCUTANEOUS at 20:33

## 2018-01-01 RX ADMIN — IPRATROPIUM BROMIDE AND ALBUTEROL SULFATE 1 AMPULE: .5; 3 SOLUTION RESPIRATORY (INHALATION) at 08:19

## 2018-01-01 RX ADMIN — Medication 10 ML: at 08:53

## 2018-01-01 RX ADMIN — Medication 10 ML: at 20:24

## 2018-01-01 RX ADMIN — MOMETASONE FUROATE AND FORMOTEROL FUMARATE DIHYDRATE 2 PUFF: 200; 5 AEROSOL RESPIRATORY (INHALATION) at 08:41

## 2018-01-01 RX ADMIN — IPRATROPIUM BROMIDE AND ALBUTEROL SULFATE 1 AMPULE: .5; 3 SOLUTION RESPIRATORY (INHALATION) at 09:17

## 2018-01-01 RX ADMIN — HYDROCODONE BITARTRATE AND ACETAMINOPHEN 1 TABLET: 5; 325 TABLET ORAL at 18:13

## 2018-01-01 RX ADMIN — MOMETASONE FUROATE AND FORMOTEROL FUMARATE DIHYDRATE 2 PUFF: 200; 5 AEROSOL RESPIRATORY (INHALATION) at 21:21

## 2018-01-01 RX ADMIN — MOMETASONE FUROATE AND FORMOTEROL FUMARATE DIHYDRATE 2 PUFF: 200; 5 AEROSOL RESPIRATORY (INHALATION) at 20:32

## 2018-01-01 RX ADMIN — HYDROCODONE BITARTRATE AND ACETAMINOPHEN 1 TABLET: 5; 325 TABLET ORAL at 06:37

## 2018-01-01 RX ADMIN — DOCUSATE SODIUM 100 MG: 100 CAPSULE, LIQUID FILLED ORAL at 08:21

## 2018-01-01 RX ADMIN — MOMETASONE FUROATE AND FORMOTEROL FUMARATE DIHYDRATE 2 PUFF: 200; 5 AEROSOL RESPIRATORY (INHALATION) at 07:36

## 2018-01-01 RX ADMIN — CETIRIZINE HYDROCHLORIDE 5 MG: 10 TABLET, FILM COATED ORAL at 08:40

## 2018-01-01 RX ADMIN — MOMETASONE FUROATE AND FORMOTEROL FUMARATE DIHYDRATE 2 PUFF: 200; 5 AEROSOL RESPIRATORY (INHALATION) at 08:03

## 2018-01-01 RX ADMIN — IPRATROPIUM BROMIDE AND ALBUTEROL SULFATE 1 AMPULE: .5; 3 SOLUTION RESPIRATORY (INHALATION) at 16:20

## 2018-01-01 RX ADMIN — HEPARIN SODIUM 5000 UNITS: 5000 INJECTION INTRAVENOUS; SUBCUTANEOUS at 09:37

## 2018-01-01 RX ADMIN — IPRATROPIUM BROMIDE AND ALBUTEROL SULFATE 1 AMPULE: .5; 3 SOLUTION RESPIRATORY (INHALATION) at 20:51

## 2018-01-01 RX ADMIN — PANTOPRAZOLE SODIUM 40 MG: 40 TABLET, DELAYED RELEASE ORAL at 17:17

## 2018-01-01 RX ADMIN — IPRATROPIUM BROMIDE AND ALBUTEROL SULFATE 1 AMPULE: .5; 3 SOLUTION RESPIRATORY (INHALATION) at 12:09

## 2018-01-01 RX ADMIN — HYDROCODONE BITARTRATE AND ACETAMINOPHEN 1 TABLET: 5; 325 TABLET ORAL at 18:41

## 2018-01-01 RX ADMIN — MOMETASONE FUROATE AND FORMOTEROL FUMARATE DIHYDRATE 2 PUFF: 200; 5 AEROSOL RESPIRATORY (INHALATION) at 08:34

## 2018-01-01 RX ADMIN — MOMETASONE FUROATE AND FORMOTEROL FUMARATE DIHYDRATE 2 PUFF: 200; 5 AEROSOL RESPIRATORY (INHALATION) at 08:19

## 2018-01-01 RX ADMIN — Medication 10 ML: at 10:47

## 2018-01-01 RX ADMIN — MIDODRINE HYDROCHLORIDE 5 MG: 5 TABLET ORAL at 13:29

## 2018-01-01 RX ADMIN — MIDODRINE HYDROCHLORIDE 2.5 MG: 5 TABLET ORAL at 07:59

## 2018-01-01 RX ADMIN — HYDROCODONE BITARTRATE AND ACETAMINOPHEN 1 TABLET: 5; 325 TABLET ORAL at 01:01

## 2018-01-01 RX ADMIN — IPRATROPIUM BROMIDE AND ALBUTEROL SULFATE 1 AMPULE: .5; 3 SOLUTION RESPIRATORY (INHALATION) at 08:52

## 2018-01-01 RX ADMIN — DOCUSATE SODIUM 100 MG: 100 CAPSULE, LIQUID FILLED ORAL at 10:46

## 2018-01-01 RX ADMIN — SODIUM CHLORIDE 250 ML: 9 INJECTION, SOLUTION INTRAVENOUS at 13:11

## 2018-01-01 RX ADMIN — CALCIUM CARBONATE-CHOLECALCIFEROL TAB 250 MG-125 UNIT 250 MG: 250-125 TAB at 08:53

## 2018-01-01 RX ADMIN — Medication 10 ML: at 21:07

## 2018-01-01 RX ADMIN — BACITRACIN ZINC AND POLYMYXIN B SULFATE: at 11:50

## 2018-01-01 RX ADMIN — MIDODRINE HYDROCHLORIDE 5 MG: 5 TABLET ORAL at 17:17

## 2018-01-01 RX ADMIN — HYDROCODONE BITARTRATE AND ACETAMINOPHEN 1 TABLET: 5; 325 TABLET ORAL at 20:24

## 2018-01-01 RX ADMIN — MOMETASONE FUROATE AND FORMOTEROL FUMARATE DIHYDRATE 2 PUFF: 200; 5 AEROSOL RESPIRATORY (INHALATION) at 20:09

## 2018-01-01 RX ADMIN — IPRATROPIUM BROMIDE AND ALBUTEROL SULFATE 1 AMPULE: .5; 3 SOLUTION RESPIRATORY (INHALATION) at 08:03

## 2018-01-01 RX ADMIN — FERROUS SULFATE TAB EC 324 MG (65 MG FE EQUIVALENT) 324 MG: 324 (65 FE) TABLET DELAYED RESPONSE at 08:53

## 2018-01-01 RX ADMIN — LEVOTHYROXINE SODIUM 100 MCG: 100 TABLET ORAL at 06:19

## 2018-01-01 RX ADMIN — FUROSEMIDE 60 MG: 10 INJECTION, SOLUTION INTRAMUSCULAR; INTRAVENOUS at 17:20

## 2018-01-01 RX ADMIN — GUAIFENESIN 600 MG: 600 TABLET, EXTENDED RELEASE ORAL at 21:08

## 2018-01-01 RX ADMIN — BISACODYL 10 MG: 10 SUPPOSITORY RECTAL at 16:17

## 2018-01-01 RX ADMIN — MAGNESIUM HYDROXIDE 30 ML: 400 SUSPENSION ORAL at 20:44

## 2018-01-01 RX ADMIN — IPRATROPIUM BROMIDE AND ALBUTEROL SULFATE 1 AMPULE: .5; 3 SOLUTION RESPIRATORY (INHALATION) at 11:58

## 2018-01-01 RX ADMIN — CALCIUM CARBONATE-CHOLECALCIFEROL TAB 250 MG-125 UNIT 250 MG: 250-125 TAB at 10:08

## 2018-01-01 RX ADMIN — GUAIFENESIN 600 MG: 600 TABLET, EXTENDED RELEASE ORAL at 08:00

## 2018-01-01 RX ADMIN — MOMETASONE FUROATE AND FORMOTEROL FUMARATE DIHYDRATE 2 PUFF: 200; 5 AEROSOL RESPIRATORY (INHALATION) at 08:10

## 2018-01-01 RX ADMIN — FUROSEMIDE 20 MG: 10 INJECTION, SOLUTION INTRAMUSCULAR; INTRAVENOUS at 17:46

## 2018-01-01 RX ADMIN — TRAMADOL HYDROCHLORIDE 50 MG: 50 TABLET, FILM COATED ORAL at 02:08

## 2018-01-01 RX ADMIN — MIDODRINE HYDROCHLORIDE 5 MG: 5 TABLET ORAL at 17:45

## 2018-01-01 RX ADMIN — GUAIFENESIN 600 MG: 600 TABLET, EXTENDED RELEASE ORAL at 21:04

## 2018-01-01 RX ADMIN — LEVOTHYROXINE SODIUM 100 MCG: 100 TABLET ORAL at 07:39

## 2018-01-01 RX ADMIN — IPRATROPIUM BROMIDE AND ALBUTEROL SULFATE 1 AMPULE: .5; 3 SOLUTION RESPIRATORY (INHALATION) at 18:52

## 2018-01-01 RX ADMIN — PANTOPRAZOLE SODIUM 40 MG: 40 TABLET, DELAYED RELEASE ORAL at 15:46

## 2018-01-01 RX ADMIN — CALCIUM CARBONATE-VITAMIN D TAB 500 MG-200 UNIT 1 TABLET: 500-200 TAB at 10:48

## 2018-01-01 RX ADMIN — IPRATROPIUM BROMIDE AND ALBUTEROL SULFATE 1 AMPULE: .5; 3 SOLUTION RESPIRATORY (INHALATION) at 16:11

## 2018-01-01 RX ADMIN — GUAIFENESIN 600 MG: 600 TABLET, EXTENDED RELEASE ORAL at 20:51

## 2018-01-01 RX ADMIN — MOMETASONE FUROATE AND FORMOTEROL FUMARATE DIHYDRATE 2 PUFF: 200; 5 AEROSOL RESPIRATORY (INHALATION) at 08:43

## 2018-01-01 RX ADMIN — Medication 10 ML: at 09:13

## 2018-01-01 RX ADMIN — DOCUSATE SODIUM 100 MG: 100 CAPSULE, LIQUID FILLED ORAL at 08:53

## 2018-01-01 RX ADMIN — HYDROCODONE BITARTRATE AND ACETAMINOPHEN 1 TABLET: 5; 325 TABLET ORAL at 10:24

## 2018-01-01 RX ADMIN — MOMETASONE FUROATE AND FORMOTEROL FUMARATE DIHYDRATE 2 PUFF: 200; 5 AEROSOL RESPIRATORY (INHALATION) at 08:45

## 2018-01-01 RX ADMIN — TIOTROPIUM BROMIDE 18 MCG: 18 CAPSULE ORAL; RESPIRATORY (INHALATION) at 08:19

## 2018-01-01 RX ADMIN — CALCIUM CARBONATE-VITAMIN D TAB 500 MG-200 UNIT 1 TABLET: 500-200 TAB at 09:13

## 2018-01-01 RX ADMIN — DOCUSATE SODIUM 100 MG: 100 CAPSULE, LIQUID FILLED ORAL at 09:37

## 2018-01-01 RX ADMIN — PANTOPRAZOLE SODIUM 40 MG: 40 TABLET, DELAYED RELEASE ORAL at 12:38

## 2018-01-01 RX ADMIN — FUROSEMIDE 40 MG: 10 INJECTION, SOLUTION INTRAMUSCULAR; INTRAVENOUS at 19:41

## 2018-01-01 RX ADMIN — SULFAMETHOXAZOLE AND TRIMETHOPRIM 0.5 TABLET: 800; 160 TABLET ORAL at 10:08

## 2018-01-01 RX ADMIN — IPRATROPIUM BROMIDE AND ALBUTEROL SULFATE 1 AMPULE: .5; 3 SOLUTION RESPIRATORY (INHALATION) at 20:31

## 2018-01-01 RX ADMIN — BACITRACIN ZINC AND POLYMYXIN B SULFATE: at 08:23

## 2018-01-01 RX ADMIN — IPRATROPIUM BROMIDE AND ALBUTEROL SULFATE 1 AMPULE: .5; 3 SOLUTION RESPIRATORY (INHALATION) at 20:00

## 2018-01-01 RX ADMIN — GUAIFENESIN 600 MG: 600 TABLET, EXTENDED RELEASE ORAL at 21:28

## 2018-01-01 RX ADMIN — IPRATROPIUM BROMIDE AND ALBUTEROL SULFATE 1 AMPULE: .5; 3 SOLUTION RESPIRATORY (INHALATION) at 18:22

## 2018-01-01 RX ADMIN — DOCUSATE SODIUM 100 MG: 100 CAPSULE, LIQUID FILLED ORAL at 08:44

## 2018-01-01 RX ADMIN — FERROUS SULFATE TAB EC 324 MG (65 MG FE EQUIVALENT) 324 MG: 324 (65 FE) TABLET DELAYED RESPONSE at 10:00

## 2018-01-01 RX ADMIN — CALCIUM CARBONATE-CHOLECALCIFEROL TAB 250 MG-125 UNIT 250 MG: 250-125 TAB at 09:37

## 2018-01-01 RX ADMIN — Medication 10 ML: at 08:59

## 2018-01-01 RX ADMIN — TIOTROPIUM BROMIDE 18 MCG: 18 CAPSULE ORAL; RESPIRATORY (INHALATION) at 08:49

## 2018-01-01 RX ADMIN — IPRATROPIUM BROMIDE AND ALBUTEROL SULFATE 1 AMPULE: .5; 3 SOLUTION RESPIRATORY (INHALATION) at 22:28

## 2018-01-01 RX ADMIN — HYDROCODONE BITARTRATE AND ACETAMINOPHEN 1 TABLET: 5; 325 TABLET ORAL at 20:44

## 2018-01-01 RX ADMIN — TIOTROPIUM BROMIDE 18 MCG: 18 CAPSULE ORAL; RESPIRATORY (INHALATION) at 08:34

## 2018-01-01 RX ADMIN — ENOXAPARIN SODIUM 30 MG: 30 INJECTION SUBCUTANEOUS at 09:15

## 2018-01-01 RX ADMIN — DOCUSATE SODIUM 100 MG: 100 CAPSULE, LIQUID FILLED ORAL at 08:40

## 2018-01-01 RX ADMIN — LEVOTHYROXINE SODIUM 100 MCG: 100 TABLET ORAL at 08:21

## 2018-01-01 RX ADMIN — MOMETASONE FUROATE AND FORMOTEROL FUMARATE DIHYDRATE 2 PUFF: 200; 5 AEROSOL RESPIRATORY (INHALATION) at 19:43

## 2018-01-01 RX ADMIN — HYDROCODONE BITARTRATE AND ACETAMINOPHEN 1 TABLET: 5; 325 TABLET ORAL at 19:41

## 2018-01-01 RX ADMIN — MOMETASONE FUROATE AND FORMOTEROL FUMARATE DIHYDRATE 2 PUFF: 200; 5 AEROSOL RESPIRATORY (INHALATION) at 07:58

## 2018-01-01 RX ADMIN — HEPARIN SODIUM 5000 UNITS: 5000 INJECTION INTRAVENOUS; SUBCUTANEOUS at 22:48

## 2018-01-01 RX ADMIN — IPRATROPIUM BROMIDE AND ALBUTEROL SULFATE 1 AMPULE: .5; 3 SOLUTION RESPIRATORY (INHALATION) at 12:27

## 2018-01-01 RX ADMIN — LEVOTHYROXINE SODIUM 100 MCG: 100 TABLET ORAL at 06:01

## 2018-01-01 RX ADMIN — PANTOPRAZOLE SODIUM 40 MG: 40 TABLET, DELAYED RELEASE ORAL at 17:13

## 2018-01-01 RX ADMIN — HYDROCODONE BITARTRATE AND ACETAMINOPHEN 1 TABLET: 5; 325 TABLET ORAL at 09:29

## 2018-01-01 RX ADMIN — SULFAMETHOXAZOLE AND TRIMETHOPRIM 0.5 TABLET: 800; 160 TABLET ORAL at 08:44

## 2018-01-01 RX ADMIN — Medication 10 ML: at 09:02

## 2018-01-01 RX ADMIN — HEPARIN SODIUM 5000 UNITS: 5000 INJECTION INTRAVENOUS; SUBCUTANEOUS at 09:29

## 2018-01-01 RX ADMIN — CETIRIZINE HYDROCHLORIDE 5 MG: 10 TABLET, FILM COATED ORAL at 09:37

## 2018-01-01 RX ADMIN — HEPARIN SODIUM 5000 UNITS: 5000 INJECTION INTRAVENOUS; SUBCUTANEOUS at 09:43

## 2018-01-01 RX ADMIN — ACETAMINOPHEN 325 MG: 325 TABLET, FILM COATED ORAL at 18:57

## 2018-01-01 RX ADMIN — MIDODRINE HYDROCHLORIDE 5 MG: 5 TABLET ORAL at 11:50

## 2018-01-01 RX ADMIN — MOMETASONE FUROATE AND FORMOTEROL FUMARATE DIHYDRATE 2 PUFF: 200; 5 AEROSOL RESPIRATORY (INHALATION) at 20:31

## 2018-01-01 RX ADMIN — IPRATROPIUM BROMIDE AND ALBUTEROL SULFATE 1 AMPULE: .5; 3 SOLUTION RESPIRATORY (INHALATION) at 16:46

## 2018-01-01 RX ADMIN — IPRATROPIUM BROMIDE AND ALBUTEROL SULFATE 1 AMPULE: .5; 3 SOLUTION RESPIRATORY (INHALATION) at 11:42

## 2018-01-01 RX ADMIN — TIOTROPIUM BROMIDE 18 MCG: 18 CAPSULE ORAL; RESPIRATORY (INHALATION) at 08:52

## 2018-01-01 RX ADMIN — Medication 10 ML: at 08:50

## 2018-01-01 RX ADMIN — HYDROCODONE BITARTRATE AND ACETAMINOPHEN 1 TABLET: 5; 325 TABLET ORAL at 14:34

## 2018-01-01 RX ADMIN — CALCIUM CARBONATE-VITAMIN D TAB 500 MG-200 UNIT 1 TABLET: 500-200 TAB at 08:59

## 2018-01-01 RX ADMIN — IPRATROPIUM BROMIDE AND ALBUTEROL SULFATE 1 AMPULE: .5; 3 SOLUTION RESPIRATORY (INHALATION) at 13:17

## 2018-01-01 RX ADMIN — METHYLPREDNISOLONE SODIUM SUCCINATE 125 MG: 125 INJECTION, POWDER, FOR SOLUTION INTRAMUSCULAR; INTRAVENOUS at 18:17

## 2018-01-01 RX ADMIN — CALCIUM CARBONATE-CHOLECALCIFEROL TAB 250 MG-125 UNIT 250 MG: 250-125 TAB at 09:04

## 2018-01-01 RX ADMIN — IPRATROPIUM BROMIDE AND ALBUTEROL SULFATE 1 AMPULE: .5; 3 SOLUTION RESPIRATORY (INHALATION) at 08:05

## 2018-01-01 RX ADMIN — BACITRACIN ZINC AND POLYMYXIN B SULFATE: at 23:50

## 2018-01-01 RX ADMIN — LEVOTHYROXINE SODIUM 100 MCG: 100 TABLET ORAL at 06:55

## 2018-01-01 RX ADMIN — HYDROCODONE BITARTRATE AND ACETAMINOPHEN 1 TABLET: 5; 325 TABLET ORAL at 01:45

## 2018-01-01 RX ADMIN — FUROSEMIDE 40 MG: 10 INJECTION, SOLUTION INTRAMUSCULAR; INTRAVENOUS at 09:13

## 2018-01-01 RX ADMIN — Medication 10 ML: at 10:08

## 2018-01-01 RX ADMIN — MIDODRINE HYDROCHLORIDE 5 MG: 5 TABLET ORAL at 08:53

## 2018-01-01 RX ADMIN — TIOTROPIUM BROMIDE 18 MCG: 18 CAPSULE ORAL; RESPIRATORY (INHALATION) at 07:58

## 2018-01-01 RX ADMIN — MIDODRINE HYDROCHLORIDE 5 MG: 5 TABLET ORAL at 08:22

## 2018-01-01 RX ADMIN — MIDODRINE HYDROCHLORIDE 5 MG: 5 TABLET ORAL at 11:12

## 2018-01-01 RX ADMIN — GUAIFENESIN 600 MG: 600 TABLET, EXTENDED RELEASE ORAL at 09:37

## 2018-01-01 RX ADMIN — MOMETASONE FUROATE AND FORMOTEROL FUMARATE DIHYDRATE 2 PUFF: 200; 5 AEROSOL RESPIRATORY (INHALATION) at 08:52

## 2018-01-01 RX ADMIN — FUROSEMIDE 20 MG: 10 INJECTION, SOLUTION INTRAMUSCULAR; INTRAVENOUS at 22:48

## 2018-01-01 RX ADMIN — TIOTROPIUM BROMIDE 18 MCG: 18 CAPSULE ORAL; RESPIRATORY (INHALATION) at 08:03

## 2018-01-01 RX ADMIN — MIDODRINE HYDROCHLORIDE 5 MG: 5 TABLET ORAL at 14:34

## 2018-01-01 RX ADMIN — FUROSEMIDE 20 MG: 10 INJECTION, SOLUTION INTRAMUSCULAR; INTRAVENOUS at 09:37

## 2018-01-01 RX ADMIN — FUROSEMIDE 40 MG: 10 INJECTION, SOLUTION INTRAMUSCULAR; INTRAVENOUS at 09:14

## 2018-01-01 RX ADMIN — GUAIFENESIN 600 MG: 600 TABLET, EXTENDED RELEASE ORAL at 09:04

## 2018-01-01 RX ADMIN — DOCUSATE SODIUM 100 MG: 100 CAPSULE, LIQUID FILLED ORAL at 09:13

## 2018-01-01 RX ADMIN — MIDODRINE HYDROCHLORIDE 5 MG: 5 TABLET ORAL at 09:38

## 2018-01-01 RX ADMIN — FUROSEMIDE 20 MG: 10 INJECTION, SOLUTION INTRAMUSCULAR; INTRAVENOUS at 17:33

## 2018-01-01 RX ADMIN — Medication 10 ML: at 19:42

## 2018-01-01 RX ADMIN — FUROSEMIDE 60 MG: 10 INJECTION, SOLUTION INTRAMUSCULAR; INTRAVENOUS at 14:16

## 2018-01-01 RX ADMIN — HYDROCODONE BITARTRATE AND ACETAMINOPHEN 1 TABLET: 5; 325 TABLET ORAL at 01:40

## 2018-01-01 RX ADMIN — MOMETASONE FUROATE AND FORMOTEROL FUMARATE DIHYDRATE 2 PUFF: 200; 5 AEROSOL RESPIRATORY (INHALATION) at 20:29

## 2018-01-01 RX ADMIN — IPRATROPIUM BROMIDE AND ALBUTEROL SULFATE 1 AMPULE: .5; 3 SOLUTION RESPIRATORY (INHALATION) at 12:50

## 2018-01-01 RX ADMIN — GLUCAGON HYDROCHLORIDE 1 MG: KIT at 12:12

## 2018-01-01 RX ADMIN — MIDODRINE HYDROCHLORIDE 5 MG: 5 TABLET ORAL at 17:13

## 2018-01-01 RX ADMIN — FERROUS SULFATE TAB EC 324 MG (65 MG FE EQUIVALENT) 324 MG: 324 (65 FE) TABLET DELAYED RESPONSE at 11:30

## 2018-01-01 RX ADMIN — HYDROCODONE BITARTRATE AND ACETAMINOPHEN 1 TABLET: 5; 325 TABLET ORAL at 17:45

## 2018-01-01 RX ADMIN — HYDROCODONE BITARTRATE AND ACETAMINOPHEN 1 TABLET: 5; 325 TABLET ORAL at 06:55

## 2018-01-01 RX ADMIN — MIDODRINE HYDROCHLORIDE 5 MG: 5 TABLET ORAL at 09:03

## 2018-01-01 RX ADMIN — IPRATROPIUM BROMIDE AND ALBUTEROL SULFATE 1 AMPULE: .5; 3 SOLUTION RESPIRATORY (INHALATION) at 07:36

## 2018-01-01 RX ADMIN — MIDODRINE HYDROCHLORIDE 5 MG: 5 TABLET ORAL at 08:40

## 2018-01-01 RX ADMIN — CETIRIZINE HYDROCHLORIDE 5 MG: 10 TABLET, FILM COATED ORAL at 09:04

## 2018-01-01 RX ADMIN — GUAIFENESIN 600 MG: 600 TABLET, EXTENDED RELEASE ORAL at 10:08

## 2018-01-01 RX ADMIN — FUROSEMIDE 40 MG: 10 INJECTION, SOLUTION INTRAMUSCULAR; INTRAVENOUS at 01:30

## 2018-01-01 RX ADMIN — FUROSEMIDE 40 MG: 10 INJECTION, SOLUTION INTRAMUSCULAR; INTRAVENOUS at 16:39

## 2018-01-01 RX ADMIN — TIOTROPIUM BROMIDE 18 MCG: 18 CAPSULE ORAL; RESPIRATORY (INHALATION) at 09:17

## 2018-01-01 RX ADMIN — IPRATROPIUM BROMIDE AND ALBUTEROL SULFATE 1 AMPULE: .5; 3 SOLUTION RESPIRATORY (INHALATION) at 15:42

## 2018-01-01 RX ADMIN — HYDROCODONE BITARTRATE AND ACETAMINOPHEN 1 TABLET: 5; 325 TABLET ORAL at 21:16

## 2018-01-01 RX ADMIN — CEFTRIAXONE 1 G: 1 INJECTION, POWDER, FOR SOLUTION INTRAMUSCULAR; INTRAVENOUS at 14:37

## 2018-01-01 RX ADMIN — CALCIUM CARBONATE-VITAMIN D TAB 500 MG-200 UNIT 1 TABLET: 500-200 TAB at 09:14

## 2018-01-01 RX ADMIN — LISINOPRIL 5 MG: 5 TABLET ORAL at 08:49

## 2018-01-01 RX ADMIN — LISINOPRIL 5 MG: 5 TABLET ORAL at 09:14

## 2018-01-01 RX ADMIN — GUAIFENESIN 600 MG: 600 TABLET, EXTENDED RELEASE ORAL at 08:53

## 2018-01-01 RX ADMIN — MAGNESIUM HYDROXIDE 30 ML: 400 SUSPENSION ORAL at 08:29

## 2018-01-01 RX ADMIN — IPRATROPIUM BROMIDE AND ALBUTEROL SULFATE 1 AMPULE: .5; 3 SOLUTION RESPIRATORY (INHALATION) at 14:09

## 2018-01-01 RX ADMIN — TORSEMIDE 20 MG: 20 TABLET ORAL at 08:22

## 2018-01-01 RX ADMIN — FUROSEMIDE 40 MG: 10 INJECTION, SOLUTION INTRAMUSCULAR; INTRAVENOUS at 22:26

## 2018-01-01 RX ADMIN — TIOTROPIUM BROMIDE 18 MCG: 18 CAPSULE ORAL; RESPIRATORY (INHALATION) at 08:46

## 2018-01-01 RX ADMIN — IPRATROPIUM BROMIDE AND ALBUTEROL SULFATE 1 AMPULE: .5; 3 SOLUTION RESPIRATORY (INHALATION) at 12:10

## 2018-01-01 RX ADMIN — IPRATROPIUM BROMIDE AND ALBUTEROL SULFATE 1 AMPULE: .5; 3 SOLUTION RESPIRATORY (INHALATION) at 08:41

## 2018-01-01 RX ADMIN — IPRATROPIUM BROMIDE AND ALBUTEROL SULFATE 1 AMPULE: .5; 3 SOLUTION RESPIRATORY (INHALATION) at 20:14

## 2018-01-01 RX ADMIN — FUROSEMIDE 60 MG: 10 INJECTION, SOLUTION INTRAMUSCULAR; INTRAVENOUS at 09:00

## 2018-01-01 RX ADMIN — SODIUM CHLORIDE 500 ML: 9 INJECTION, SOLUTION INTRAVENOUS at 12:12

## 2018-01-01 RX ADMIN — LEVOTHYROXINE SODIUM 100 MCG: 100 TABLET ORAL at 06:37

## 2018-01-01 RX ADMIN — CETIRIZINE HYDROCHLORIDE 5 MG: 10 TABLET, FILM COATED ORAL at 08:44

## 2018-01-01 RX ADMIN — BACITRACIN ZINC AND POLYMYXIN B SULFATE: at 17:28

## 2018-01-01 RX ADMIN — MOMETASONE FUROATE AND FORMOTEROL FUMARATE DIHYDRATE 2 PUFF: 200; 5 AEROSOL RESPIRATORY (INHALATION) at 08:05

## 2018-01-01 RX ADMIN — CALCIUM CARBONATE-CHOLECALCIFEROL TAB 250 MG-125 UNIT 250 MG: 250-125 TAB at 08:00

## 2018-01-01 RX ADMIN — HYDROCODONE BITARTRATE AND ACETAMINOPHEN 1 TABLET: 5; 325 TABLET ORAL at 14:05

## 2018-01-01 RX ADMIN — HEPARIN SODIUM 5000 UNITS: 5000 INJECTION INTRAVENOUS; SUBCUTANEOUS at 11:20

## 2018-01-01 RX ADMIN — MOMETASONE FUROATE AND FORMOTEROL FUMARATE DIHYDRATE 2 PUFF: 200; 5 AEROSOL RESPIRATORY (INHALATION) at 18:52

## 2018-01-01 RX ADMIN — SULFAMETHOXAZOLE AND TRIMETHOPRIM 0.5 TABLET: 800; 160 TABLET ORAL at 11:20

## 2018-01-01 RX ADMIN — METOPROLOL SUCCINATE 25 MG: 25 TABLET, EXTENDED RELEASE ORAL at 10:43

## 2018-01-01 RX ADMIN — MOMETASONE FUROATE AND FORMOTEROL FUMARATE DIHYDRATE 2 PUFF: 200; 5 AEROSOL RESPIRATORY (INHALATION) at 21:01

## 2018-01-01 RX ADMIN — HEPARIN SODIUM 5000 UNITS: 5000 INJECTION INTRAVENOUS; SUBCUTANEOUS at 10:08

## 2018-01-01 RX ADMIN — DOCUSATE SODIUM 100 MG: 100 CAPSULE, LIQUID FILLED ORAL at 07:59

## 2018-01-01 RX ADMIN — FUROSEMIDE 20 MG: 10 INJECTION, SOLUTION INTRAMUSCULAR; INTRAVENOUS at 11:19

## 2018-01-01 RX ADMIN — CETIRIZINE HYDROCHLORIDE 5 MG: 10 TABLET, FILM COATED ORAL at 08:53

## 2018-01-01 RX ADMIN — HYDROCODONE BITARTRATE AND ACETAMINOPHEN 1 TABLET: 5; 325 TABLET ORAL at 00:55

## 2018-01-01 RX ADMIN — HEPARIN SODIUM 5000 UNITS: 5000 INJECTION INTRAVENOUS; SUBCUTANEOUS at 08:54

## 2018-01-01 RX ADMIN — CALCIUM CARBONATE-VITAMIN D TAB 500 MG-200 UNIT 1 TABLET: 500-200 TAB at 08:53

## 2018-01-01 RX ADMIN — LEVOTHYROXINE SODIUM 100 MCG: 100 TABLET ORAL at 08:08

## 2018-01-01 RX ADMIN — GUAIFENESIN 600 MG: 600 TABLET, EXTENDED RELEASE ORAL at 20:32

## 2018-01-01 RX ADMIN — DOCUSATE SODIUM 100 MG: 100 CAPSULE, LIQUID FILLED ORAL at 08:49

## 2018-01-01 RX ADMIN — PANTOPRAZOLE SODIUM 40 MG: 40 TABLET, DELAYED RELEASE ORAL at 16:37

## 2018-01-01 RX ADMIN — POLYETHYLENE GLYCOL 3350, SODIUM SULFATE ANHYDROUS, SODIUM BICARBONATE, SODIUM CHLORIDE, POTASSIUM CHLORIDE 4000 ML: 236; 22.74; 6.74; 5.86; 2.97 POWDER, FOR SOLUTION ORAL at 00:11

## 2018-01-01 RX ADMIN — FUROSEMIDE 40 MG: 10 INJECTION, SOLUTION INTRAMUSCULAR; INTRAVENOUS at 08:49

## 2018-01-01 RX ADMIN — IPRATROPIUM BROMIDE AND ALBUTEROL SULFATE 1 AMPULE: .5; 3 SOLUTION RESPIRATORY (INHALATION) at 20:09

## 2018-01-01 RX ADMIN — TIOTROPIUM BROMIDE 18 MCG: 18 CAPSULE ORAL; RESPIRATORY (INHALATION) at 08:09

## 2018-01-01 RX ADMIN — HEPARIN SODIUM 5000 UNITS: 5000 INJECTION INTRAVENOUS; SUBCUTANEOUS at 21:31

## 2018-01-01 RX ADMIN — TIOTROPIUM BROMIDE 18 MCG: 18 CAPSULE ORAL; RESPIRATORY (INHALATION) at 08:43

## 2018-01-01 RX ADMIN — METOPROLOL SUCCINATE 25 MG: 25 TABLET, EXTENDED RELEASE ORAL at 08:49

## 2018-01-01 RX ADMIN — METOPROLOL SUCCINATE 25 MG: 25 TABLET, EXTENDED RELEASE ORAL at 09:15

## 2018-01-01 RX ADMIN — PANTOPRAZOLE SODIUM 40 MG: 40 TABLET, DELAYED RELEASE ORAL at 17:27

## 2018-01-01 RX ADMIN — MIDODRINE HYDROCHLORIDE 5 MG: 5 TABLET ORAL at 16:37

## 2018-01-01 RX ADMIN — MIDODRINE HYDROCHLORIDE 5 MG: 5 TABLET ORAL at 12:38

## 2018-01-01 RX ADMIN — TRAMADOL HYDROCHLORIDE 50 MG: 50 TABLET, FILM COATED ORAL at 20:50

## 2018-01-01 RX ADMIN — BACITRACIN ZINC AND POLYMYXIN B SULFATE: at 10:08

## 2018-01-01 RX ADMIN — LEVOTHYROXINE SODIUM 100 MCG: 100 TABLET ORAL at 06:39

## 2018-01-01 RX ADMIN — FUROSEMIDE 60 MG: 10 INJECTION, SOLUTION INTRAMUSCULAR; INTRAVENOUS at 10:47

## 2018-01-01 RX ADMIN — FUROSEMIDE 20 MG: 10 INJECTION, SOLUTION INTRAMUSCULAR; INTRAVENOUS at 10:15

## 2018-01-01 RX ADMIN — MOMETASONE FUROATE AND FORMOTEROL FUMARATE DIHYDRATE 2 PUFF: 200; 5 AEROSOL RESPIRATORY (INHALATION) at 09:17

## 2018-01-01 RX ADMIN — MIDODRINE HYDROCHLORIDE 5 MG: 5 TABLET ORAL at 10:08

## 2018-01-01 RX ADMIN — GUAIFENESIN 600 MG: 600 TABLET, EXTENDED RELEASE ORAL at 20:44

## 2018-01-01 RX ADMIN — ENOXAPARIN SODIUM 30 MG: 30 INJECTION SUBCUTANEOUS at 10:47

## 2018-01-01 RX ADMIN — Medication 10 ML: at 08:58

## 2018-01-01 RX ADMIN — SULFAMETHOXAZOLE AND TRIMETHOPRIM 0.5 TABLET: 800; 160 TABLET ORAL at 21:31

## 2018-01-01 RX ADMIN — Medication 10 ML: at 22:17

## 2018-01-01 RX ADMIN — TIOTROPIUM BROMIDE 18 MCG: 18 CAPSULE ORAL; RESPIRATORY (INHALATION) at 08:01

## 2018-01-01 RX ADMIN — HYDROCODONE BITARTRATE AND ACETAMINOPHEN 1 TABLET: 5; 325 TABLET ORAL at 02:09

## 2018-01-01 RX ADMIN — MOMETASONE FUROATE AND FORMOTEROL FUMARATE DIHYDRATE 2 PUFF: 200; 5 AEROSOL RESPIRATORY (INHALATION) at 07:57

## 2018-01-01 RX ADMIN — HYDROCODONE BITARTRATE AND ACETAMINOPHEN 1 TABLET: 5; 325 TABLET ORAL at 17:31

## 2018-01-01 RX ADMIN — CALCIUM CARBONATE-CHOLECALCIFEROL TAB 250 MG-125 UNIT 250 MG: 250-125 TAB at 08:40

## 2018-01-01 RX ADMIN — Medication 10 ML: at 21:28

## 2018-01-01 RX ADMIN — SODIUM CHLORIDE 500 ML: 9 INJECTION, SOLUTION INTRAVENOUS at 11:04

## 2018-01-01 RX ADMIN — CEFEPIME HYDROCHLORIDE 2 G: 2 INJECTION, POWDER, FOR SOLUTION INTRAVENOUS at 11:50

## 2018-01-01 RX ADMIN — PANTOPRAZOLE SODIUM 40 MG: 40 TABLET, DELAYED RELEASE ORAL at 17:45

## 2018-01-01 RX ADMIN — LEVOTHYROXINE SODIUM 100 MCG: 100 TABLET ORAL at 05:40

## 2018-01-01 RX ADMIN — HYDROCODONE BITARTRATE AND ACETAMINOPHEN 1 TABLET: 5; 325 TABLET ORAL at 03:08

## 2018-01-01 RX ADMIN — MOMETASONE FUROATE AND FORMOTEROL FUMARATE DIHYDRATE 2 PUFF: 200; 5 AEROSOL RESPIRATORY (INHALATION) at 20:14

## 2018-01-01 RX ADMIN — HEPARIN SODIUM 5000 UNITS: 5000 INJECTION INTRAVENOUS; SUBCUTANEOUS at 08:41

## 2018-01-01 RX ADMIN — HEPARIN SODIUM 5000 UNITS: 5000 INJECTION INTRAVENOUS; SUBCUTANEOUS at 20:54

## 2018-01-01 RX ADMIN — MOMETASONE FUROATE AND FORMOTEROL FUMARATE DIHYDRATE 2 PUFF: 200; 5 AEROSOL RESPIRATORY (INHALATION) at 20:21

## 2018-01-01 RX ADMIN — HEPARIN SODIUM 5000 UNITS: 5000 INJECTION INTRAVENOUS; SUBCUTANEOUS at 21:28

## 2018-01-01 RX ADMIN — HEPARIN SODIUM 5000 UNITS: 5000 INJECTION INTRAVENOUS; SUBCUTANEOUS at 21:04

## 2018-01-01 RX ADMIN — LEVOTHYROXINE SODIUM 100 MCG: 100 TABLET ORAL at 06:10

## 2018-01-01 RX ADMIN — Medication 10 ML: at 11:23

## 2018-01-01 RX ADMIN — DOCUSATE SODIUM 100 MG: 100 CAPSULE, LIQUID FILLED ORAL at 09:14

## 2018-01-01 RX ADMIN — TIOTROPIUM BROMIDE 18 MCG: 18 CAPSULE ORAL; RESPIRATORY (INHALATION) at 07:36

## 2018-01-01 RX ADMIN — BACITRACIN ZINC AND POLYMYXIN B SULFATE: at 13:31

## 2018-01-01 RX ADMIN — FUROSEMIDE 20 MG: 10 INJECTION, SOLUTION INTRAMUSCULAR; INTRAVENOUS at 17:17

## 2018-01-01 RX ADMIN — TORSEMIDE 20 MG: 10 TABLET ORAL at 21:20

## 2018-01-01 RX ADMIN — Medication 10 ML: at 21:20

## 2018-01-01 RX ADMIN — ACETAMINOPHEN 325 MG: 325 TABLET, FILM COATED ORAL at 05:40

## 2018-01-01 RX ADMIN — Medication 10 ML: at 08:54

## 2018-01-01 RX ADMIN — CETIRIZINE HYDROCHLORIDE 5 MG: 10 TABLET, FILM COATED ORAL at 07:59

## 2018-01-01 RX ADMIN — HYDROCODONE BITARTRATE AND ACETAMINOPHEN 1 TABLET: 5; 325 TABLET ORAL at 22:26

## 2018-01-01 RX ADMIN — LEVOTHYROXINE SODIUM 100 MCG: 100 TABLET ORAL at 06:51

## 2018-01-01 RX ADMIN — MIDODRINE HYDROCHLORIDE 5 MG: 5 TABLET ORAL at 13:05

## 2018-01-01 RX ADMIN — CALCIUM CARBONATE-VITAMIN D TAB 500 MG-200 UNIT 1 TABLET: 500-200 TAB at 08:49

## 2018-01-01 RX ADMIN — TORSEMIDE 20 MG: 20 TABLET ORAL at 08:40

## 2018-01-01 RX ADMIN — HEPARIN SODIUM 5000 UNITS: 5000 INJECTION INTRAVENOUS; SUBCUTANEOUS at 08:55

## 2018-01-01 RX ADMIN — Medication 10 ML: at 20:45

## 2018-01-01 RX ADMIN — IPRATROPIUM BROMIDE AND ALBUTEROL SULFATE 1 AMPULE: .5; 3 SOLUTION RESPIRATORY (INHALATION) at 16:01

## 2018-01-01 RX ADMIN — TRAMADOL HYDROCHLORIDE 50 MG: 50 TABLET, FILM COATED ORAL at 15:09

## 2018-01-01 RX ADMIN — BACITRACIN ZINC AND POLYMYXIN B SULFATE: at 08:45

## 2018-01-01 RX ADMIN — GUAIFENESIN 600 MG: 600 TABLET, EXTENDED RELEASE ORAL at 08:40

## 2018-01-01 ASSESSMENT — PAIN SCALES - WONG BAKER

## 2018-01-01 ASSESSMENT — ENCOUNTER SYMPTOMS
COUGH: 0
EYE ITCHING: 0
VOMITING: 0
APNEA: 0
NAUSEA: 0
CHEST TIGHTNESS: 0
CHOKING: 0
COUGH: 0
EYE DISCHARGE: 0
COLOR CHANGE: 0
SHORTNESS OF BREATH: 1
DIARRHEA: 0
SHORTNESS OF BREATH: 1
PHOTOPHOBIA: 0
ABDOMINAL DISTENTION: 0
STRIDOR: 0
EYE PAIN: 0
WHEEZING: 0
ABDOMINAL PAIN: 0
EYE REDNESS: 0

## 2018-01-01 ASSESSMENT — PAIN DESCRIPTION - FREQUENCY
FREQUENCY: CONTINUOUS
FREQUENCY: INTERMITTENT
FREQUENCY: CONTINUOUS

## 2018-01-01 ASSESSMENT — PAIN DESCRIPTION - PAIN TYPE
TYPE: CHRONIC PAIN
TYPE: ACUTE PAIN
TYPE: CHRONIC PAIN
TYPE: ACUTE PAIN
TYPE: CHRONIC PAIN
TYPE: CHRONIC PAIN
TYPE: ACUTE PAIN

## 2018-01-01 ASSESSMENT — PAIN DESCRIPTION - DIRECTION: RADIATING_TOWARDS: LEG LEFT

## 2018-01-01 ASSESSMENT — PAIN DESCRIPTION - LOCATION
LOCATION: ABDOMEN
LOCATION: HIP
LOCATION: HIP
LOCATION: HIP;LEG
LOCATION: ABDOMEN
LOCATION: HIP
LOCATION: ABDOMEN
LOCATION: HIP;LEG
LOCATION: LEG;HIP
LOCATION: FLANK
LOCATION: HIP;LEG
LOCATION: LEG;HIP
LOCATION: LEG
LOCATION: HIP
LOCATION: ABDOMEN
LOCATION: HIP;LEG
LOCATION: HIP
LOCATION: HIP
LOCATION: HIP;LEG

## 2018-01-01 ASSESSMENT — PAIN SCALES - GENERAL
PAINLEVEL_OUTOF10: 0
PAINLEVEL_OUTOF10: 0
PAINLEVEL_OUTOF10: 2
PAINLEVEL_OUTOF10: 9
PAINLEVEL_OUTOF10: 9
PAINLEVEL_OUTOF10: 3
PAINLEVEL_OUTOF10: 1
PAINLEVEL_OUTOF10: 0
PAINLEVEL_OUTOF10: 4
PAINLEVEL_OUTOF10: 9
PAINLEVEL_OUTOF10: 2
PAINLEVEL_OUTOF10: 9
PAINLEVEL_OUTOF10: 6
PAINLEVEL_OUTOF10: 7
PAINLEVEL_OUTOF10: 0
PAINLEVEL_OUTOF10: 4
PAINLEVEL_OUTOF10: 0
PAINLEVEL_OUTOF10: 6
PAINLEVEL_OUTOF10: 9
PAINLEVEL_OUTOF10: 5
PAINLEVEL_OUTOF10: 8
PAINLEVEL_OUTOF10: 9
PAINLEVEL_OUTOF10: 6
PAINLEVEL_OUTOF10: 5
PAINLEVEL_OUTOF10: 9
PAINLEVEL_OUTOF10: 5
PAINLEVEL_OUTOF10: 7
PAINLEVEL_OUTOF10: 0
PAINLEVEL_OUTOF10: 9
PAINLEVEL_OUTOF10: 10
PAINLEVEL_OUTOF10: 5
PAINLEVEL_OUTOF10: 3
PAINLEVEL_OUTOF10: 8
PAINLEVEL_OUTOF10: 0
PAINLEVEL_OUTOF10: 6
PAINLEVEL_OUTOF10: 10
PAINLEVEL_OUTOF10: 10
PAINLEVEL_OUTOF10: 8
PAINLEVEL_OUTOF10: 7
PAINLEVEL_OUTOF10: 9
PAINLEVEL_OUTOF10: 4
PAINLEVEL_OUTOF10: 0
PAINLEVEL_OUTOF10: 7
PAINLEVEL_OUTOF10: 0
PAINLEVEL_OUTOF10: 9
PAINLEVEL_OUTOF10: 8
PAINLEVEL_OUTOF10: 3
PAINLEVEL_OUTOF10: 8
PAINLEVEL_OUTOF10: 10
PAINLEVEL_OUTOF10: 3
PAINLEVEL_OUTOF10: 4
PAINLEVEL_OUTOF10: 8
PAINLEVEL_OUTOF10: 9
PAINLEVEL_OUTOF10: 0
PAINLEVEL_OUTOF10: 3
PAINLEVEL_OUTOF10: 4
PAINLEVEL_OUTOF10: 7
PAINLEVEL_OUTOF10: 5
PAINLEVEL_OUTOF10: 0
PAINLEVEL_OUTOF10: 9
PAINLEVEL_OUTOF10: 8
PAINLEVEL_OUTOF10: 0
PAINLEVEL_OUTOF10: 3
PAINLEVEL_OUTOF10: 5
PAINLEVEL_OUTOF10: 0
PAINLEVEL_OUTOF10: 9
PAINLEVEL_OUTOF10: 6
PAINLEVEL_OUTOF10: 9
PAINLEVEL_OUTOF10: 9

## 2018-01-01 ASSESSMENT — PAIN DESCRIPTION - PROGRESSION
CLINICAL_PROGRESSION: GRADUALLY IMPROVING
CLINICAL_PROGRESSION: GRADUALLY WORSENING
CLINICAL_PROGRESSION: GRADUALLY WORSENING
CLINICAL_PROGRESSION: RAPIDLY WORSENING

## 2018-01-01 ASSESSMENT — PAIN DESCRIPTION - ONSET
ONSET: GRADUAL
ONSET: ON-GOING
ONSET: PROGRESSIVE
ONSET: GRADUAL
ONSET: ON-GOING

## 2018-01-01 ASSESSMENT — PAIN DESCRIPTION - DESCRIPTORS
DESCRIPTORS: ACHING
DESCRIPTORS: ACHING
DESCRIPTORS: CRAMPING
DESCRIPTORS: ACHING
DESCRIPTORS: DISCOMFORT
DESCRIPTORS: ACHING
DESCRIPTORS: DISCOMFORT
DESCRIPTORS: ACHING

## 2018-01-01 ASSESSMENT — PAIN DESCRIPTION - ORIENTATION
ORIENTATION: LEFT;UPPER;LOWER;MID
ORIENTATION: LEFT
ORIENTATION: ANTERIOR
ORIENTATION: LEFT
ORIENTATION: ANTERIOR
ORIENTATION: LEFT
ORIENTATION: LEFT

## 2018-01-04 NOTE — COMMUNICATION BODY
DIAGNOSIS:    1- Right femoral neck displaced fracture, status post Press-Fit bipolar hemiarthroplasty. 2- Left foot pain/ foot contusion-new    DATE OF SURGERY:  9/10/2017  . HISTORY OF PRESENT ILLNESS:  Ms. Mary Lou Mi 80 y.o.  female who came in today for 3 months postoperative visit. The patient denies any significant pain in the right hip. Rates pain a 0/10 VAS and is doing very well. Much improved. She  has been walking weightbearing as tolerated using a walker and has been working with PT in a NH with good improvement. No numbness or tingling sensation. No fever or Chills. Her left foot pain has resolved and is much improved. No other complaint. She is on Eliquis. She was recently treated for a UTI and recently has barium for testing. Past Medical History:   Diagnosis Date    Cardiomyopathy [facility])     Chemotherapy     CHF (congestive heart failure) (HCC)     Emphysema     Emphysema     Emphysema (subcutaneous) (surgical) resulting from a procedure     Hypertension     Osteoarthritis     Thyroid disease     Had parathyroidectomy       Past Surgical History:   Procedure Laterality Date    BREAST SURGERY      Left breast lumpectomy    BUNIONECTOMY Right     CATARACT REMOVAL Bilateral     COLONOSCOPY  2011    EYE SURGERY      FOOT SURGERY      toe    HIP ARTHROPLASTY Right 09/12/2017    right bipolar hip    MOHS SURGERY  7/2012    PARATHYROID GLAND SURGERY  01/2008    removal    PARATHYROIDECTOMY      NE LEFT HEART CATH,PERCUTANEOUS      Cardiac Cath, Left Heart       Social History     Social History    Marital status:      Spouse name: N/A    Number of children: N/A    Years of education: N/A     Occupational History    Not on file.      Social History Main Topics    Smoking status: Former Smoker     Types: Cigarettes     Quit date: 12/13/1980    Smokeless tobacco: Never Used    Alcohol use No    Drug use: No    Sexual activity: Not Currently     Other Topics capsule Take 10 mg by mouth 4 times daily as needed (bowel cramps)       docusate sodium (COLACE) 100 MG capsule Take 100 mg by mouth 2 times daily       metroNIDAZOLE (METROGEL) 0.75 % gel Apply topically 2 times daily Apply topically 2 times daily.  vitamin D (CHOLECALCIFEROL) 1000 units TABS tablet Take 1,000 Units by mouth daily      Respiratory Therapy Supplies (NEBULIZER/ADULT MASK) KIT 1 ampule by Does not apply route every 8 hours as needed (shortness of breath) 1 kit 0    OXYGEN nightly.  Multiple Vitamins-Minerals (MULTI FOR HER 50+ PO) Take 1 tablet by mouth daily       Misc Natural Products (GLUCOSAMINE CHONDROITIN ADV PO) Take 1 tablet by mouth daily        No current facility-administered medications on file prior to visit. Pertinent items are noted in HPI  Review of systems reviewed from Patient History Form dated on 11/14/2017 and available in the patient's chart under the Media tab. No change noted. PHYSICAL EXAMINATION:  Ms. Kem Schaefer is a very pleasant 80 y.o.  female who presents today in no acute distress, awake, alert, and oriented. She is well dressed, nourished and  groomed. Patient with normal affect. Height is  5' (1.524 m), weight is 160 lb (72.6 kg), Body mass index is 31.25 kg/m². Resting respiratory rate is 16. She ambulates today , no limp, using a walker. The incision is completely healed right hip. No signs of any erythema or drainage. She has no pain with the active or passive range of motion of the right hip. She has intact sensation, distally, and she is neurovascularly intact. She  has intact sensation and good pedal pulses. She has no tenderness on deep palpation over the left foot. Jerk Good strength, and no instability both upper and lower extremities. IMAGING:  X-rays were taken in the office today, AP pelvis and 2 views of the right hip and femur, and showed the Press-Fit hemiarthroplasty in good position.   No signs of any lucency. There is barium seen in the bowel. Osmel Salgado were reviewed, Dated 11/14/2017 in office, 3 views of the left  foot, and showed no displaced fracture. IMPRESSION:    1- Right hip hemiarthroplasty and doing very well. 2- Left foot contusion-new. PLAN:  For the hip:  I have told the patient to continue PT, weightbearing as tolerated, as well as strengthening exercises. The patient will come back for a follow up in 3 months. At that time, we will take AP pelvis and 2 views of the affected hip. For the foot: She can go back to normal activity with no restrictions. She will be seen PRN. As this patient has demonstrated risk factors for osteoporosis, such as age greater than [de-identified] years and evidence of a fracture, I have referred the patient back to the primary care physician for evaluation for osteoporosis, including consideration for DEXA scanning, if this is felt to be clinically indicated. The patient is advised to contact the primary care physician to follow-up for further evaluation.        Janie Holbrook MD

## 2018-02-01 ENCOUNTER — OFFICE VISIT (OUTPATIENT)
Dept: ORTHOPEDIC SURGERY | Age: 83
End: 2018-02-01

## 2018-02-01 VITALS
HEIGHT: 62 IN | HEART RATE: 56 BPM | DIASTOLIC BLOOD PRESSURE: 64 MMHG | BODY MASS INDEX: 29.44 KG/M2 | RESPIRATION RATE: 15 BRPM | WEIGHT: 160 LBS | SYSTOLIC BLOOD PRESSURE: 122 MMHG

## 2018-02-01 DIAGNOSIS — S72.001A CLOSED FRACTURE OF RIGHT HIP, INITIAL ENCOUNTER (HCC): Primary | ICD-10-CM

## 2018-02-01 PROCEDURE — G8417 CALC BMI ABV UP PARAM F/U: HCPCS | Performed by: ORTHOPAEDIC SURGERY

## 2018-02-01 PROCEDURE — 99213 OFFICE O/P EST LOW 20 MIN: CPT | Performed by: ORTHOPAEDIC SURGERY

## 2018-02-01 PROCEDURE — 1036F TOBACCO NON-USER: CPT | Performed by: ORTHOPAEDIC SURGERY

## 2018-02-01 PROCEDURE — 1090F PRES/ABSN URINE INCON ASSESS: CPT | Performed by: ORTHOPAEDIC SURGERY

## 2018-02-01 PROCEDURE — 4040F PNEUMOC VAC/ADMIN/RCVD: CPT | Performed by: ORTHOPAEDIC SURGERY

## 2018-02-01 PROCEDURE — G8427 DOCREV CUR MEDS BY ELIG CLIN: HCPCS | Performed by: ORTHOPAEDIC SURGERY

## 2018-02-01 PROCEDURE — 1123F ACP DISCUSS/DSCN MKR DOCD: CPT | Performed by: ORTHOPAEDIC SURGERY

## 2018-02-01 PROCEDURE — G8484 FLU IMMUNIZE NO ADMIN: HCPCS | Performed by: ORTHOPAEDIC SURGERY

## 2018-02-01 NOTE — PROGRESS NOTES
DIAGNOSIS:    1- Right femoral neck displaced fracture, status post Press-Fit bipolar hemiarthroplasty. 2- Left foot pain/ foot contusion-improvede    DATE OF SURGERY:  9/10/2017  . HISTORY OF PRESENT ILLNESS:  Ms. Theron Jaramillo 80 y.o.  female who came in today for 6 months postoperative visit. The patient denies any significant pain in the right hip. Rates pain a 0/10 VAS and is doing very well. Much improved. She  has been walking weightbearing as tolerated using a walker and completed PT in a NH with good improvement. No numbness or tingling sensation. No fever or Chills. Her left foot pain has resolved and is much improved. No other complaint. She is on Eliquis. She was recently treated for a UTI and recently has barium for testing. Past Medical History:   Diagnosis Date    Cardiomyopathy [facility])     Chemotherapy     CHF (congestive heart failure) (HCC)     Emphysema     Emphysema     Emphysema (subcutaneous) (surgical) resulting from a procedure     Hypertension     Osteoarthritis     Thyroid disease     Had parathyroidectomy       Past Surgical History:   Procedure Laterality Date    BREAST SURGERY      Left breast lumpectomy    BUNIONECTOMY Right     CATARACT REMOVAL Bilateral     COLONOSCOPY  2011    EYE SURGERY      FOOT SURGERY      toe    HIP ARTHROPLASTY Right 09/12/2017    right bipolar hip    MOHS SURGERY  7/2012    PARATHYROID GLAND SURGERY  01/2008    removal    PARATHYROIDECTOMY      NE LEFT HEART CATH,PERCUTANEOUS      Cardiac Cath, Left Heart       Social History     Social History    Marital status:      Spouse name: N/A    Number of children: N/A    Years of education: N/A     Occupational History    Not on file.      Social History Main Topics    Smoking status: Former Smoker     Types: Cigarettes     Quit date: 12/13/1980    Smokeless tobacco: Never Used    Alcohol use No    Drug use: No    Sexual activity: Not Currently     Other Topics Concern  Not on file     Social History Narrative    No narrative on file       Family History   Problem Relation Age of Onset    Heart Disease Mother     Cancer Father     Hearing Loss Father     Cancer Brother      bladder    Allergies Brother     Other Brother      GI issues, unknown    Cancer Brother      skin    Parkinsonism Brother     Asthma Brother        Current Outpatient Prescriptions on File Prior to Visit   Medication Sig Dispense Refill    apixaban (ELIQUIS) 5 MG TABS tablet Take 1 tablet by mouth 2 times daily 166 tablet 0    lisinopril (PRINIVIL;ZESTRIL) 40 MG tablet Take 40 mg by mouth every evening      fluticasone-salmeterol (ADVAIR HFA) 115-21 MCG/ACT inhaler Inhale 2 puffs into the lungs 2 times daily      Calcium Carbonate-Vitamin D (CALCIUM-VITAMIN D) 500-200 MG-UNIT per tablet Take 1 tablet by mouth daily      calcium carbonate (TUMS EX) 750 MG chewable tablet Take 1 tablet by mouth daily      metroNIDAZOLE (METROGEL) 0.75 % gel Apply topically 2 times daily Apply topically 2 times daily.  vitamin D (CHOLECALCIFEROL) 1000 units TABS tablet Take 1,000 Units by mouth daily      HYDROcodone-acetaminophen (NORCO) 5-325 MG per tablet Take 1 tablet by mouth every 4 hours as needed for Pain  .       tiZANidine (ZANAFLEX) 2 MG tablet Take 2 mg by mouth every 8 hours as needed      b complex vitamins capsule Take 1 capsule by mouth 2 times daily      Respiratory Therapy Supplies (NEBULIZER/ADULT MASK) KIT 1 ampule by Does not apply route every 8 hours as needed (shortness of breath) 1 kit 0    albuterol (PROVENTIL) (2.5 MG/3ML) 0.083% nebulizer solution Take 3 mLs by nebulization every 6 hours as needed for Wheezing or Shortness of Breath 120 each 3    acetaminophen (TYLENOL) 325 MG tablet Take 2 tablets by mouth every 4 hours as needed for Pain or Fever 120 tablet 3    guaiFENesin-codeine (CHERATUSSIN AC) 100-10 MG/5ML syrup Take 5-10 mLs by mouth every 4 hours as needed for

## 2018-04-06 PROBLEM — J96.00 ACUTE RESPIRATORY FAILURE (HCC): Status: ACTIVE | Noted: 2018-04-06

## 2018-04-07 PROBLEM — J69.0 ASPIRATION PNEUMONIA (HCC): Status: ACTIVE | Noted: 2018-04-07

## 2018-04-08 PROBLEM — E03.9 HYPOTHYROID: Status: ACTIVE | Noted: 2018-04-08

## 2018-04-09 PROBLEM — A41.9 SEPTIC SHOCK (HCC): Status: ACTIVE | Noted: 2018-04-09

## 2018-04-09 PROBLEM — R65.21 SEPTIC SHOCK (HCC): Status: ACTIVE | Noted: 2018-04-09

## 2018-04-09 PROBLEM — J15.20 STAPHYLOCOCCAL PNEUMONIA (HCC): Status: ACTIVE | Noted: 2018-04-09

## 2018-04-09 PROBLEM — N17.9 AKI (ACUTE KIDNEY INJURY) (HCC): Status: ACTIVE | Noted: 2018-04-09

## 2018-05-07 PROBLEM — N39.0 UTI (URINARY TRACT INFECTION): Status: RESOLVED | Noted: 2017-09-10 | Resolved: 2018-05-07

## 2018-07-15 PROBLEM — I50.33 ACUTE ON CHRONIC DIASTOLIC CHF (CONGESTIVE HEART FAILURE) (HCC): Status: ACTIVE | Noted: 2018-01-01

## 2018-07-15 PROBLEM — I50.33 DIASTOLIC CHF, ACUTE ON CHRONIC (HCC): Status: ACTIVE | Noted: 2018-01-01

## 2018-07-19 PROBLEM — I50.43 ACUTE ON CHRONIC COMBINED SYSTOLIC AND DIASTOLIC HF (HEART FAILURE) (HCC): Status: ACTIVE | Noted: 2018-01-01

## 2018-07-30 NOTE — PROGRESS NOTES
as Taking for the 18 encounter (Office Visit) with Stephan Solis MD   Medication Sig Dispense Refill    metoprolol succinate (TOPROL XL) 25 MG extended release tablet Take 1 tablet by mouth daily 30 tablet 3    torsemide (DEMADEX) 20 MG tablet Take 1 tablet by mouth daily 30 tablet 3    aspirin 81 MG tablet Take 81 mg by mouth daily      docusate sodium (COLACE) 100 MG capsule Take 100 mg by mouth daily      Probiotic Product (PROBIOTIC DAILY PO) Take by mouth      Misc Natural Products (OSTEO BI-FLEX JOINT SHIELD) TABS Take 1 tablet by mouth daily      BOSWELLIA MESFIN PO Take by mouth      COLLAGEN PO Take 10 mg by mouth daily      levothyroxine (SYNTHROID) 100 MCG tablet Take 1 tablet by mouth Daily 30 tablet 0    guaiFENesin (MUCINEX) 600 MG extended release tablet Take 1,200 mg by mouth 2 times daily as needed for Congestion      fluticasone-salmeterol (ADVAIR HFA) 115-21 MCG/ACT inhaler Inhale 2 puffs into the lungs 2 times daily      Calcium Carbonate-Vitamin D (CALCIUM-VITAMIN D) 500-200 MG-UNIT per tablet Take 1 tablet by mouth daily      calcium carbonate (TUMS EX) 750 MG chewable tablet Take 1 tablet by mouth daily      metroNIDAZOLE (METROGEL) 0.75 % gel Apply topically 2 times daily Apply to face 2 times daily.  Cholecalciferol 2000 units TABS Take 2,000 Units by mouth daily       HYDROcodone-acetaminophen (NORCO) 5-325 MG per tablet Take 1 tablet by mouth every 4 hours as needed for Pain  .  OXYGEN 2 L/min continuous       Multiple Vitamins-Minerals (MULTI FOR HER 50+ PO) Take 1 tablet by mouth daily          Labs:   Lab Results   Component Value Date    HDL 71 2018    HDL 62 2010    LDLCALC 81 2018    TRIG 59 2018       EK2017, Marked Sinus Bradycardia  2018, controlled AFIB    ECHO: 18  Normal LV size with EF 35%. Global LV dysfunction. Trivial mitral regurgitation is present.   Severe biatrial enlargement  The right ventricle

## 2018-07-30 NOTE — PATIENT INSTRUCTIONS
important mineral, which is sometimes lost with diuretics. ¨ Aspirin and other blood thinners prevent blood clots, which can cause a stroke or heart attack.    You will get more details on the specific medicines your doctor prescribes. Diet    · Your doctor may suggest that you limit sodium to 2,000 milligrams (mg) a day or less. That is less than 1 teaspoon of salt a day, including all the salt you eat in cooking or in packaged foods. People get most of their sodium from processed foods. Fast food and restaurant meals also tend to be very high in sodium.     · Ask your doctor how much liquid you can drink each day. You may have to limit liquids.    Weight    · Weigh yourself without clothing at the same time each day. Record your weight. Call your doctor if you have a sudden weight gain, such as more than 2 to 3 pounds in a day or 5 pounds in a week. (Your doctor may suggest a different range of weight gain.) A sudden weight gain may mean that your heart failure is getting worse.    Activity level    · Start light exercise (if your doctor says it is okay). Even if you can only do a small amount, exercise will help you get stronger, have more energy, and manage your weight and your stress. Walking is an easy way to get exercise. Start out by walking a little more than you did before. Bit by bit, increase the amount you walk.     · When you exercise, watch for signs that your heart is working too hard. You are pushing yourself too hard if you cannot talk while you are exercising. If you become short of breath or dizzy or have chest pain, stop, sit down, and rest.     · If you feel \"wiped out\" the day after you exercise, walk slower or for a shorter distance until you can work up to a better pace.     · Get enough rest at night. Sleeping with 1 or 2 pillows under your upper body and head may help you breathe easier.    Lifestyle changes    · Do not smoke. Smoking can make a heart condition worse.  If you need help quitting, talk to your doctor about stop-smoking programs and medicines. These can increase your chances of quitting for good. Quitting smoking may be the most important step you can take to protect your heart.     · Limit alcohol to 2 drinks a day for men and 1 drink a day for women. Too much alcohol can cause health problems.     · Avoid getting sick from colds and the flu. Get a pneumococcal vaccine shot. If you have had one before, ask your doctor whether you need another dose. Get a flu shot each year. If you must be around people with colds or the flu, wash your hands often. When should you call for help? Call 911 if you have symptoms of sudden heart failure such as:    · You have severe trouble breathing.     · You cough up pink, foamy mucus.     · You have a new irregular or rapid heartbeat.    Call your doctor now or seek immediate medical care if:    · You have new or increased shortness of breath.     · You are dizzy or lightheaded, or you feel like you may faint.     · You have sudden weight gain, such as more than 2 to 3 pounds in a day or 5 pounds in a week. (Your doctor may suggest a different range of weight gain.)     · You have increased swelling in your legs, ankles, or feet.     · You are suddenly so tired or weak that you cannot do your usual activities.    Watch closely for changes in your health, and be sure to contact your doctor if you develop new symptoms. Where can you learn more? Go to https://[employer]. org and sign in to your [employer] account. Enter W443 in the [employer] box to learn more about \"Heart Failure: Care Instructions. \"     If you do not have an account, please click on the \"Sign Up Now\" link. Current as of: May 10, 2017  Content Version: 11.6  © 0762-2240 [employer]. Care instructions adapted under license by [facility] ([facility] [place]  If you have questions about a medical condition or this instruction, always ask your healthcare

## 2018-08-01 NOTE — TELEPHONE ENCOUNTER
Micheal Plummer     Discussed with Dr. Titus Valiente as Dr. Christian Gomez is in office. Have patient continue torsemide as scheduled but hold lisinopril for 2 days. She will need to monitor her BP and call with an update.

## 2018-08-03 NOTE — TELEPHONE ENCOUNTER
Dr. Erickson Grissom,     Pt called in earlier this wk with c/o hypotension (76/44). She was instructed by Dr. Alta Cassidy in your absence to continue Torsemide and hold Lisinopril for 2 days. We received an update today that she feels much better since holding Lisinopril 5 mg. Please advise if you would like her to remain off Lisinopril, take 2.5 mg daily or switch to another medication? She has a hx of chronic AFIB, HTN and Cardiomyopathy EF 35% (7/2018) reduced from 50% (9/2017).

## 2018-08-08 NOTE — TELEPHONE ENCOUNTER
Yanick Diaz at Dr. Selam Jorge office (ent) called in stating that pt called to schedule an appointment stating she was referred by Dr. Kelly Sexton for her nosebleeds. She is wanting to know if they can have the referral sent over. Did not see referral in pt's chart.      You can reach Yanick Diaz at #[phone]    Fax: [phone]

## 2018-08-29 NOTE — PROGRESS NOTES
file.     Social History Main Topics    Smoking status: Former Smoker     Types: Cigarettes     Quit date: 12/13/1980    Smokeless tobacco: Never Used    Alcohol use No    Drug use: No    Sexual activity: Not Currently     Other Topics Concern    Not on file     Social History Narrative    No narrative on file       Family History   Problem Relation Age of Onset    Heart Disease Mother     Cancer Father     Hearing Loss Father     Cancer Brother         bladder    Allergies Brother     Other Brother         GI issues, unknown    Cancer Brother         skin    Parkinsonism Brother     Asthma Brother        Current Outpatient Prescriptions on File Prior to Visit   Medication Sig Dispense Refill    lisinopril (PRINIVIL;ZESTRIL) 5 MG tablet Take 1 tablet by mouth daily 30 tablet 3    metoprolol succinate (TOPROL XL) 25 MG extended release tablet Take 1 tablet by mouth daily 30 tablet 3    torsemide (DEMADEX) 20 MG tablet Take 1 tablet by mouth daily 30 tablet 3    docusate sodium (COLACE) 100 MG capsule Take 100 mg by mouth daily      Probiotic Product (PROBIOTIC DAILY PO) Take by mouth      Misc Natural Products (OSTEO BI-FLEX JOINT SHIELD) TABS Take 1 tablet by mouth daily      BOSWELLIA MESFIN PO Take by mouth      COLLAGEN PO Take 10 mg by mouth daily      levothyroxine (SYNTHROID) 100 MCG tablet Take 1 tablet by mouth Daily 30 tablet 0    guaiFENesin (MUCINEX) 600 MG extended release tablet Take 1,200 mg by mouth 2 times daily as needed for Congestion      fluticasone-salmeterol (ADVAIR HFA) 115-21 MCG/ACT inhaler Inhale 2 puffs into the lungs 2 times daily      Calcium Carbonate-Vitamin D (CALCIUM-VITAMIN D) 500-200 MG-UNIT per tablet Take 1 tablet by mouth daily      calcium carbonate (TUMS EX) 750 MG chewable tablet Take 1 tablet by mouth daily      metroNIDAZOLE (METROGEL) 0.75 % gel Apply topically 2 times daily Apply to face 2 times daily.       Cholecalciferol 2000 units TABS to a year after a fracture. For the foot: She can go back to normal activity with no restrictions. She will be seen PRN. As this patient has demonstrated risk factors for osteoporosis, such as age greater than [de-identified] years and evidence of a fracture, I have referred the patient back to the primary care physician for evaluation for osteoporosis, including consideration for DEXA scanning, if this is felt to be clinically indicated. The patient is advised to contact the primary care physician to follow-up for further evaluation.        Madisyn Davalos MD

## 2018-09-18 PROBLEM — J44.1 COPD WITH ACUTE EXACERBATION (HCC): Status: ACTIVE | Noted: 2018-01-01

## 2018-10-13 NOTE — ED PROVIDER NOTES
results found. PROCEDURES   Unless otherwise noted below, none     Procedures    CRITICAL CARE TIME   N/A    CONSULTS:  IP CONSULT TO PRIMARY CARE PROVIDER  IP CONSULT TO HOSPITALIST  IP CONSULT TO HEART FAILURE NURSE/COORDINATOR  IP CONSULT TO DIETITIAN  IP CONSULT TO CARDIOLOGY      EMERGENCY DEPARTMENT COURSE and DIFFERENTIALDIAGNOSIS/MDM:   Vitals:    Vitals:    10/13/18 2251 10/13/18 2307 10/13/18 2322 10/13/18 2352   BP: (!) 142/88 (!) 147/91 122/63 (!) 105/56   Pulse: 82 92 80 80   Resp: (!) 32 (!) 33 21 16   Temp:       TempSrc:       SpO2: 94% 93% 95% 95%   Weight:       Height:           Patient was given thefollowing medications:  Medications   methylPREDNISolone sodium (SOLU-MEDROL) injection 125 mg (125 mg Intravenous Given 10/13/18 1817)   ipratropium-albuterol (DUONEB) nebulizer solution 1 ampule (1 ampule Inhalation Given 10/13/18 1822)   HYDROcodone-acetaminophen (NORCO) 5-325 MG per tablet 1 tablet (1 tablet Oral Given 10/13/18 2116)   torsemide (DEMADEX) tablet 20 mg (20 mg Oral Given 10/13/18 2120)   ipratropium-albuterol (DUONEB) nebulizer solution 1 ampule (1 ampule Inhalation Given 10/13/18 2228)       Differential Diagnosis: Acute Coronary Syndrome, Congestive Heart Failure, Myocardial Infarction, Pulmonary Embolus, Pneumonia, Pneumothorax, other    Patient presents for shortness of breath. See HPI for presentation. Physical exam as above. Patient is in no respiratory distress. She is on slightly more oxygen than she wears at home. Her lungs are diminished but CTA bilaterally. She has swollen extremities but not unilateral.  She is neurovascularly intact without deficits. She lives with her daughter who is at bedside. No leukocytosis but she does have anemia with a hemoglobin of 10.4 hematocrit of 31.9. She has chronic kidney insufficiency and has a creatinine of 1.4 and GFR 35. No other electrolyte abnormalities or other liver function. Troponin negative.   BNP improved at 2000.  CT chest shows bilateral pleural effusions with pulmonary edema. Emergency department course included [place] treatments and steroids. She was given torsemide as well as Vicodin. She was resting comfortably my reassessment. I talked to patient's primary care provider and he advised the patient was stable we can double her dose of torsemide and she can follow-up with him in the office on Monday or Tuesday. Patient was ambulating on her home oxygen and desatted down in the low 90s. She will be admitted for hypoxia on her home oxygen. She was given all test results and given an opportunity to ask and have any questions answered. She was agreeable to admission. The hospitalist was consulted and agreed to admit patient and write orders. The patient tolerated their visit well. They were seen and evaluated by the attending physician who agreed with the assessment and plan. The patient and / or thefamily were informed of the results of any tests, a time was given to answer questions, a plan was proposed and they agreed with plan. FINAL IMPRESSION      1. Chronic congestive heart failure, unspecified heart failure type ([facility] [address]    2. Chronic obstructive pulmonary disease, unspecified COPD type ([facility] [address]    3. Hypoxemia          DISPOSITION/PLAN   DISPOSITION Admitted 10/14/2018 12:06:51 AM      PATIENT REFERREDTO:  No follow-up provider specified.     DISCHARGE MEDICATIONS:  New Prescriptions    No medications on file       DISCONTINUED MEDICATIONS:  Discontinued Medications    No medications on file              (Please note that portions ofthis note were completed with a voice recognition program.  Efforts were made to edit the dictations but occasionally words are mis-transcribed.)    SURESH Castellanos CNP (electronically signed)           SURESH Castellanos CNP  10/14/18 0009

## 2018-10-13 NOTE — LETTER
· To find a different doctor, visit Medicare's Physician Compare website, HDTapes.co.nz, or call 1-800-MEDICARE (107 9583). TTY users should call 3-836.486.5498. · To find a different skilled nursing facility, visit [facility] website, [email or web address].[employer]/, or call 1-800-MEDICARE (1- 231.151.6605). TTY users should call 8-262.558.8833. · To find a different long term care hospital, visit [facility] Compare website, [employer], or call 1-800- MEDICARE (730 8712). TTY users should call 8-276.731.6237. · To find a different inpatient rehabilitation facility, visit [facility] Compare website, www.medicare.gov/ inpatientrehabilitation facilitycompare, or call 1-800-MEDICARE (6-767.563.5674). TTY users should call 5- [phone]. · To find a different home health agency, visit [address] website, www.medicare.gov/homehealthcompare, or call 1-800-MEDICARE (1-180- 881-3355). TTY users should call [phone].

## 2018-10-14 PROBLEM — I50.1 PULMONARY EDEMA WITH CONGESTIVE HEART FAILURE (HCC): Status: ACTIVE | Noted: 2018-01-01

## 2018-10-14 NOTE — CONSULTS
[address]  Cardiology Consult Note        CC:      Shortness of breath             HPI:   Dearpancho Eckert is a 80 y.o. female with PMH significant for HTN, CHF, COPD, and A. fib who presents to the emergency department today from home via EMS complaining of shortness of breath and swelling. She states that her home health provider came to her house yesterday and advised her that her legs were more swollen. She states she woke up around 8:00 this morning and has been more short of breath throughout the day. She denies cough. She denies chest pain or chest tightness. She wears 2 L of oxygen at home continuously via nasal cannula, and on EMS arrival she was 88% on her 2 L. On arrival patient is alert and oriented and in no respiratory distress. Admission wt 129 lbs.   Last predischarge wt 113  On Torsemide 20 daily    Past Medical History:   Diagnosis Date    Atrial fibrillation ([facility] [address]     C. difficile colitis     history of/  negative specimen 4/12/2018    Cardiomyopathy ([facility] [address]     Chemotherapy     CHF (congestive heart failure) (HCC)     Emphysema     Emphysema     Emphysema (subcutaneous) (surgical) resulting from a procedure     Hypertension     Hypothyroidism     Osteoarthritis     Rosacea     Thyroid disease     Had parathyroidectomy      Past Surgical History:   Procedure Laterality Date    BREAST SURGERY      Left breast lumpectomy    BUNIONECTOMY Right     CATARACT REMOVAL Bilateral     COLONOSCOPY  2011    EYE SURGERY      FOOT SURGERY      toe    HIP ARTHROPLASTY Right 09/12/2017    right bipolar hip    MOHS SURGERY  7/2012    PARATHYROID GLAND SURGERY  01/2008    removal    PARATHYROIDECTOMY      [place] LEFT HEART CATH,PERCUTANEOUS      Cardiac Cath, Left Heart      Family History   Problem Relation Age of Onset    Heart Disease Mother     Cancer Father     Hearing Loss Father     Cancer Brother         bladder    Allergies Brother     Other Brother         GI APTT in the last 72 hours. Recent Labs      10/13/18   1807   TROPONINI  <0.01     Lab Results   Component Value Date    HDL 71 07/16/2018    HDL 62 07/13/2010    LDLCALC 81 07/16/2018    TRIG 59 07/16/2018     Recent Labs      10/13/18   1807   AST  33   ALT  29   LABALBU  3.7         EKG:   Atrial fibrillation  Left anterior fascicular block  Poor R wave progression    Chest X-Ray:  Similar findings most suggestive of mild vascular congestive changes with   moderate bilateral pleural effusions and likely associated atelectasis. Underlying pneumonia is not excluded.  Continued imaging follow-up is   recommended. Corornary angiogram  & Intervention:2/10/2011  1. The patient has apical cardiomyopathy, with a large area of the left ventricle, almost two-thirds of it, which is dyskinetic. 2.  Ejection fraction is 25%. 3.  No obstructive coronary artery disease. 4.  Elevated left heart filling pressures of 25 mmHg. ECHO: 7/16/18  Normal LV size with EF 35%. Global LV dysfunction. Trivial mitral regurgitation is present. Severe biatrial enlargement  The right ventricle is normal in size and function. Severe tricuspid regurgitation. Systolic pulmonary artery pressure (SPAP) is estimated at 42 mmHg consistent  with mild pulmonary hypertension (RA pressure 3 mmHg). There is a large left pleural effusion. ASSESSMENT AND PLAN:      Acute on chronic combined systolic and diastolic HF  Agree with LI MANCIA  10/14/2018

## 2018-10-14 NOTE — H&P
Hospital Medicine History & Physical      PCP: Rose Marie Jo MD    Date of Admission: 10/13/2018    Date of Service: Pt seen/examined, with 1st encounter, on 10/14/2018 and Admitted to Inpatient   Chief Complaint:  SOB      History Of Present Illness: The patient is a 80 y.o. female with PMH as below, on home O2 2L, who presents to [facility] with SOB and LE edema. On arrival she was 88% on 2L O2. In the ED chest CT showed pulmonary edema. BNP was 2K, was 4300 at her last admission on 9/20. She denies chest pain. Past Medical History:        Diagnosis Date    [facility] fibrillation ([facility] [address]     C. difficile colitis     history of/  negative specimen 4/12/2018    Cardiomyopathy ([facility] [address]     Chemotherapy     CHF (congestive heart failure) (HCC)     Emphysema     Emphysema     Emphysema (subcutaneous) (surgical) resulting from a procedure     Hypertension     Hypothyroidism     Osteoarthritis     Thyroid disease     Had parathyroidectomy       Past Surgical History:        Procedure Laterality Date    BREAST SURGERY      Left breast lumpectomy    BUNIONECTOMY Right     CATARACT REMOVAL Bilateral     COLONOSCOPY  2011    EYE SURGERY      FOOT SURGERY      toe    HIP ARTHROPLASTY Right 09/12/2017    right bipolar hip    MOHS SURGERY  7/2012    PARATHYROID GLAND SURGERY  01/2008    removal    PARATHYROIDECTOMY      [place] LEFT HEART CATH,PERCUTANEOUS      Cardiac Cath, Left Heart       Medications Prior to Admission:    Prior to Admission medications    Medication Sig Start Date End Date Taking?  Authorizing Provider   tiotropium (SPIRIVA RESPIMAT) 1.25 MCG/ACT AERS inhaler Inhale 2 puffs into the lungs daily 9/20/18   Keisha Falcon MD   albuterol sulfate HFA (PROAIR HFA) 108 (90 Base) MCG/ACT inhaler Inhale 2 puffs into the lungs every 4 hours as needed for Historical Provider, MD       Allergies:  Adhesive tape    Social History:      TOBACCO:   reports that she quit smoking about 37 years ago. Her smoking use included Cigarettes. She has never used smokeless tobacco.  ETOH:   reports that she does not drink alcohol. Family History:      Family History   Problem Relation Age of Onset    Heart Disease Mother     Cancer Father     Hearing Loss Father     Cancer Brother         bladder    Allergies Brother     Other Brother         GI issues, unknown    Cancer Brother         skin    Parkinsonism Brother     Asthma Brother        REVIEW OF SYSTEMS:   Positive for SOB and as noted in the HPI. All other systems reviewed and negative. PHYSICAL EXAM:    BP (!) 105/56   Pulse 80   Temp 97.5 °F (36.4 °C) (Oral)   Resp 16   Ht 4' 11\" (1.499 m)   Wt 129 lb 6.6 oz (58.7 kg)   SpO2 95%   BMI 26.14 kg/m²     General appearance: No apparent distress, cooperative. HEENT Normal cephalic, atraumatic without obvious deformity. PERRL. EOM. Conjunctivae/corneas clear. Neck: Supple, No jugular venous distention/bruits. Trachea midline   Lungs: rales in BL lung bases, no wheezing, without accessory muscle use. Heart: Regular rate and rhythm with Normal S1/S2 without murmurs, rubs or gallops  Abdomen: Soft, non-tender or non-distended without rigidity or guarding and positive bowel sounds all four quadrants. Extremities: No clubbing, cyanosis. +1 pitting edema bilaterally. Skin: Skin color, texture, turgor normal.  No rashes or lesions. Neurologic: Alert and oriented X 3, neurovascularly intact with sensory/motor intact upper extremities/lower extremities, bilaterally. Grossly non-focal.  Mental status: Alert, oriented, thought content appropriate.   Peripheral Pulses: +3 Easily felt, not easily obliterated with pressure  Cap refill  +2 sec    CXR:  I have reviewed the CXR with the following interpretation: pulmonary edema    CBC   Recent Labs

## 2018-10-14 NOTE — PROGRESS NOTES
[facility] Medicine Progress Note      Admit Date: 10/13/2018         Overnight Events: No    CC: F/U for SOB    HPI: The patient is an 80year old,  female, on 2L O2 per NC at baseline, who presented to [address] [facility] with complaints of SOB and BLE edema. The patient's O2 saturation was 88% on 2L O2. In the ER, a chest CT showed pulmonary edema. The patient was admitted for acute CHF exacerbation, NYHA class IV on admission. IV lasix was initiated. Cardiology was consulted. Interval History/Subjective: The patient notes that she is feeling better since admission and is breathing more easily. Review of Systems:     Comprehensive ROS negative except as mentioned below.     _____________________________________________________________________  General ROS:  []  fevers  []  chills  []  fatigue  []  weakness  []  night sweats  []  body aches [] Other:  _____________________________________________________________________  HEENT ROS:  []  trauma  []  headache  []  visual changes  []  double vision  []  blurred vision  []  tinnitus  []  vertigo  []  ear ache  []  drainage  []  bleeding gums  []  hoarseness  []  voice change  []  difficult/painful swallowing  []  stuffiness  []  rhinorrhea  []  sneezing  []  epistaxis [] Other:  _____________________________________________________________________  Respiratory ROS:  []  cough  [x]  SOB  []  wheezing  []  changes in sputum production or quality  []  hemoptysis  []  pleurisy  []  snoring [] Other:  _____________________________________________________________________  Cardiovascular ROS:  [] palpitations  []  pain  [x]  HIRSCH  [x]  orthopnea  []  syncope  [x]  lower extremity edema [] Other:  _____________________________________________________________________  Gastrointestinal ROS:  []  Dysphagia  [] ABD pain  []  nausea  []  vomiting  []  indigestion  []  diarrhea  []  constipation []

## 2018-10-14 NOTE — ED NOTES
ARMOND Garcia at bedside updating pt an family on plan of care.       Marcela Laird RN  10/13/18 1561

## 2018-10-14 NOTE — PROGRESS NOTES
4 Eyes Skin Assessment     The patient is being assess for  Admission    I agree that 2 RN's have performed a thorough Head to Toe Skin Assessment on the patient. ALL assessment sites listed below have been assessed. Skin tear to L FA.   surigcally removed skin cancer to L forehead. Waiting for skin graft. drsg in place     Areas assessed by both nurses:   [x]   Head, Face, and Ears   [x]   Shoulders, Back, and Chest  [x]   Arms, Elbows, and Hands   [x]   Coccyx, Sacrum, and IschIum  [x]   Legs, Feet, and Heels        Does the Patient have Skin Breakdown?   Yes LDA WOUND CARE was Initiated documentation include the Mirella-wound, Wound Assessment, Measurements, Dressing Treatment, Drainage, and Color\",         Thomas Prevention initiated:  Yes   Wound Care Orders initiated:  Yes      [id number]  nurse consulted for Pressure Injury (Stage 3,4, Unstageable, DTI, NWPT, and Complex wounds), New and Established Ostomies:  NA      Nurse 1 eSignature: Electronically signed by Hannah Talley RN on 10/14/18 at 4:27 AM    **SHARE this note so that the co-signing nurse is able to place an eSignature**    Nurse 2 eSignature: {Esignature:[id number]}

## 2018-10-15 PROBLEM — A41.9 SEPTIC SHOCK (HCC): Status: RESOLVED | Noted: 2018-04-09 | Resolved: 2018-01-01

## 2018-10-15 PROBLEM — J44.1 COPD WITH ACUTE EXACERBATION (HCC): Status: RESOLVED | Noted: 2018-01-01 | Resolved: 2018-01-01

## 2018-10-15 PROBLEM — N17.9 AKI (ACUTE KIDNEY INJURY) (HCC): Status: RESOLVED | Noted: 2018-04-09 | Resolved: 2018-01-01

## 2018-10-15 PROBLEM — I50.33 DIASTOLIC CHF, ACUTE ON CHRONIC (HCC): Status: RESOLVED | Noted: 2018-01-01 | Resolved: 2018-01-01

## 2018-10-15 PROBLEM — R65.21 SEPTIC SHOCK (HCC): Status: RESOLVED | Noted: 2018-04-09 | Resolved: 2018-01-01

## 2018-10-15 PROBLEM — J96.00 ACUTE RESPIRATORY FAILURE (HCC): Status: RESOLVED | Noted: 2018-04-06 | Resolved: 2018-01-01

## 2018-10-15 PROBLEM — J69.0 ASPIRATION PNEUMONIA (HCC): Status: RESOLVED | Noted: 2018-04-07 | Resolved: 2018-01-01

## 2018-10-15 NOTE — PROGRESS NOTES
REMOVAL Bilateral     COLONOSCOPY  2011    EYE SURGERY      FOOT SURGERY      toe    HIP ARTHROPLASTY Right 09/12/2017    right bipolar hip    MOHS SURGERY  7/2012    PARATHYROIDECTOMY      [place] LEFT HEART CATH,PERCUTANEOUS      Cardiac Cath, Left Heart       Allergies:  Adhesive tape    Past medical and surgical history reviewed. Any changes have been noted. Scheduled and prn Medications:    Scheduled Meds:   mometasone-formoterol  2 puff Inhalation BID    Oyster Shell Calcium/D  1 tablet Oral Daily    levothyroxine  100 mcg Oral Daily    docusate sodium  100 mg Oral Daily    metoprolol succinate  25 mg Oral Daily    sodium chloride flush  10 mL Intravenous 2 times per day    furosemide  40 mg Intravenous BID    enoxaparin  30 mg Subcutaneous Daily    tiotropium  18 mcg Inhalation Daily     Continuous Infusions:  PRN Meds:. HYDROcodone-acetaminophen, guaiFENesin, albuterol sulfate HFA, sodium chloride flush, ondansetron    PHYSICAL EXAM:  /71   Pulse 101   Temp 98.1 °F (36.7 °C) (Oral)   Resp 15   Ht 4' 11\" (1.499 m)   Wt 121 lb 0.5 oz (54.9 kg)   SpO2 93%   BMI 24.45 kg/m²       Intake/Output Summary (Last 24 hours) at 10/15/18 1728  Last data filed at 10/15/18 1519   Gross per 24 hour   Intake              970 ml   Output              650 ml   Net              320 ml       General: Alert and oriented. Resting in bed in no apparent distress. [place]. Pleasant and cooperative. Thin. HEENT: Normocephalic. Atraumatic. Pupils equal and reactive. EOM intact. Oral mucosa pink/moist/intact. O2 per NC  Neck: Supple. Symmetrical. Trachea midline. Lungs: Clear to auscultation bilaterally, diminished t/o. Respirations even and unlabored. Chest: Exam unremarkable. Cardiac: S1/S2 noted. Regular Rhythm and rate. Abdomen/GI: Soft. Non-tender. Non-distended. BS+. Extremities: PP+. Atraumatic. No redness/cyanosis. + 1 pitting edema BLE. Brisk cap refill. Skin: Dry and intact. No lesions noted.

## 2018-10-15 NOTE — CONSULTS
Nutrition Education    Type and Reason for Visit: Consult, Patient Education    Consult for \"Heart failure guidelines\". Pt has been educated previously by dietitian. Pt reports she lives at home with her daughter who is very strict and helps her watch her diet. The fluid restriction was new to her last admission and states she has been watching her fluids using a pitcher at home. She doesn't add salt to any of her foods and they mostly do home cooking from scratch. Declined any additional education and had no questions about diet.      Electronically signed by Shravan Cabral RD, LD on 10/15/18 at 12:30 PM    Contact Number: [phone]

## 2018-10-15 NOTE — PROGRESS NOTES
Patient alert and oriented. No complaints of pain at this present time. Assessment completed. Dressing change to arm done. Medications administered. Patient assisted with activities of daily living. Monitor patient progress.

## 2018-10-16 PROBLEM — I50.23 ACUTE ON CHRONIC SYSTOLIC HEART FAILURE (HCC): Status: ACTIVE | Noted: 2018-01-01

## 2018-10-16 NOTE — PROGRESS NOTES
OT eval/tx with PT. Pt in bed on arrival, agreeable to participate in therapy. Pt eager for out of bed. Pt denies pain. Pain Assessment  Patient Currently in Pain: No (pt given pain meds prior and pain is now gone)  Pain Assessment: 0-10  Veras-Baker Pain Rating: No hurt  Pain Level: 9  Pain Type: Chronic pain  Pain Location: Hip, Leg  Pain Orientation: Left  Pain Descriptors: Aching    Social/Functional History  Social/Functional History  Lives With: Daughter (on disability, limited ability to assist pt. )  Type of Home: House  Home Layout: One level, Able to Live on Main level with bedroom/bathroom, Performs ADL's on one level  Home Access: Ramped entrance (Garage entrance 1 step)  Bathroom Shower/Tub: Tub/Shower unit, Shower chair with back  [address]: Handicap height  Bathroom Equipment: Grab bars in shower, Shower chair, Grab bars around toilet, Hand-held shower  Bathroom Accessibility: Walker accessible  Home Equipment: Rolling walker, Lift chair, Oxygen, Reacher, Sock aid, Wheelchair-manual, Long-handled shoehorn (transport w/c)  Receives Help From: Family, Home health (HHA 1x a week for 2 hours for light housekeeping)  ADL Assistance: Needs assistance (Dtr assist in/out shower, with shower.   Pt reports that she is able to dress self with use of reacher and sock aid, toilets self)  Homemaking Assistance: Needs assistance (HHA does cleaning; dgt does laundry and shopping; pt can do light cooking but dgt makes dinner)  Ambulation Assistance: Independent (With [address] within home; uses the transport w/c when going to doctor)  Transfer Assistance: Independent (sleep in LIFT chair)  Active : No  Patient's  Info: dgt  Mode of Transportation: Family  Additional Comments: Pt reports rarely home alone very short periods of time (if dtr to store, MD appt etc); the pt reports no recent falls       Objective   Vision: Impaired  Vision Exceptions: Wears glasses for reading  Hearing: Exceptions to

## 2018-10-16 NOTE — CONSULTS
(49.2 kg)   06/14/18 120 lb (54.4 kg)   05/21/18 130 lb (59 kg)   04/17/18 150 lb 12.7 oz (68.4 kg)   02/01/18 160 lb (72.6 kg)   12/21/17 160 lb (72.6 kg)   12/16/17 159 lb 2.8 oz (72.2 kg)   12/11/17 144 lb (65.3 kg)   11/26/17 152 lb 5.4 oz (69.1 kg)   11/14/17 160 lb (72.6 kg)   09/28/17 160 lb (72.6 kg)   09/15/17 160 lb 8 oz (72.8 kg)   08/16/12 145 lb (65.8 kg)   02/27/12 140 lb (63.5 kg)   08/22/11 132 lb (59.9 kg)   05/17/11 136 lb (61.7 kg)   02/22/10 180 lb (81.6 kg)          Intake/Output Summary (Last 24 hours) at 10/16/18 1418  Last data filed at 10/16/18 1416   Gross per 24 hour   Intake             1210 ml   Output             1400 ml   Net             -190 ml       LABS  BMP: Lab Results   Component Value Date     10/16/2018    K 3.7 10/16/2018    K 4.5 09/19/2018    CL 96 10/16/2018    CO2 32 10/16/2018    BUN 54 10/16/2018    LABALBU 3.7 10/13/2018    CREATININE 1.2 10/16/2018    CALCIUM 9.3 10/16/2018    GFRAA 51 10/16/2018    GFRAA >60 05/24/2011    LABGLOM 42 10/16/2018    GLUCOSE 79 10/16/2018     CBC: Recent Labs      10/13/18   1807  10/15/18   0731   WBC  9.6  7.1   HGB  10.4*  9.6*   HCT  31.9*  30.1*   MCV  83.9  83.4   PLT  192  175     BNP:   Lab Results   Component Value Date    BNP 76.9 05/24/2011       ECHOCARDIOGRAM:   7/16/2018  Summary   Normal LV size with EF 35%.  Global LV dysfunction.   Trivial mitral regurgitation is present.   Severe biatrial enlargement   The right ventricle is normal in size and function.   Severe tricuspid regurgitation.   Systolic pulmonary artery pressure (SPAP) is estimated at 42 mmHg consistent   with mild pulmonary hypertension (RA pressure 3 mmHg).   There is a large left pleural effusion.       Assessment:     CONSULTS:   IP CONSULT TO PRIMARY CARE PROVIDER  IP CONSULT TO HOSPITALIST  IP CONSULT TO HEART FAILURE NURSE/COORDINATOR  IP CONSULT TO DIETITIAN  IP CONSULT TO CARDIOLOGY  IP CONSULT TO HEART FAILURE NURSE/COORDINATOR    Patient has a states she did call on Saturday to physician on call but did not receive a call back so brought her mom in to ER. Pt's DRY WEIGHT IS AROUND 113-115 lbs. Pt is being diuresed lasix iv 40mg BID. Wt is staying the same but her urine output is difficult to assess as is incont at times. Cardiology is consulted and following. Cr is stable at 1.2 (1.4 on admit). In regards to missed appts, dtr states they cancelled PCP appt as had been \"going to so many appts for her skin cancer removal and we had a home care nurse checking on her. \"  Dtr states the 10/12 appt that was scheduled with Dr Mima Pfeiffer was cancelled prior to last admit. I reiterated and stressed the need of close MD follow up to help manage HF. I also talked with dtr at length about Dr Jos Mohan order of being able to take and extra diuretic dose if note wt is up. I instructed her to call if they have to do this more than once as she was worried about then running out of diuretic for the month. Will mention to MD about PRN script. Dtr does pt's grocery shopping, cooking, laundry, and drives pt to appts etc. Denies any issues obtaining meds. Pt has COA for cleaning one time a week for 2 hours. Pt was active with [facility] for PT and RN. I have added HF instructions to AVS, Will monitor please resume HF at dc. We will add HF instructions on JUSTIN as well. They again decline [facility]. \"It's too many appts and we have a nurse coming to house. \"     HF RN will continue to follow to assist. Pt will need a 7 day f/u appt scheduled prior to dc. Since dtr does driving, pt asked I try to coordinate it with her dtr. Will continue to follow. Thank you        CURRENT DIET: DIET CARDIAC; Low Sodium (2 GM); Daily Fluid Restriction: 1800 ml    EDUCATIONAL PACKETS PROVIDED- PRINTED FROM [employer]. Titles and material given:  Yes   [x]  What is Heart Failure?   [x]  Heart Failure: Warning Signs of a Flare-Up  [x]  Heart Failure: Making Changes to Your Diet  [x]  Heart Failure: Medications to Help Your Heart   [x]  Other: HF Zones and Wt log sheets. PATIENT/CAREGIVER TEACHING:    Level of patient/caregiver understanding able to:   [x] Verbalize understanding   [] Demonstrate understanding       [x] Teach back        [] Needs reinforcement     []  Other:       TEACHING TIME:  70 minutes       Plan:       DISCHARGE PLAN:   Placement for patient upon discharge: home with support of dtr [facility]:  no  Code Status: DNR-CCA  Discharge appointment scheduled: No indication of dc date at this time. HF RN will assist with making appts      RECOMMENDATIONS:   [x]  Encourage to call Heart Failure Resource Line with any questions or concerns. [x]  Educate further Ms. Ye's on fluid restriction 48 oz- 64 oz during inpatient stay so she can understand how to measure intake at home. [x]  Continue to educate on S/S of Heart Failure.   [x]  Emphasize daily weights, diet, and if changes, to call Heart Failure Resource Line  [x]  Other:  Resume home care and COA          Electronically signed by Kun Mendosa, RN, BSN CHFN  on 10/16/2018 at 2:18 PM

## 2018-10-16 NOTE — PROGRESS NOTES
overloaded. Continue with attempts at gentle diuresis. As weight stable and I/O essentially even, will increase Lasix dose to 60mg IV BID. Continue B-blocker. No ACE-I/ARB/aldactone with intermittent hypotension and as goals of treatment are conservative/comfort based. 2) Chronic atrial fibrillation. Rate controlled. Continue B-blocker. Poor anticoagulation candidate with advanced age and down-trending Hgb. Not anticoagulated as an outpatient per her wishes. 3) Essential hypertension. Controlled. Goal BP <130/80. Continue medical therapy. 4) Anemia. Continue to trend. Will check iron studies. 5) COPD/Acute on chronic hypoxic respiratory failure. Will defer to primary team if she should be treated for COPD exacerbation/antibiotics with her productive cough.      Electronically signed by Magaly Howard MD on 10/16/2018 at 2:48 PM

## 2018-10-16 NOTE — PROGRESS NOTES
Dysphagia  [] ABD pain  []  nausea  []  vomiting  []  indigestion  []  diarrhea  []  constipation [] Other:  _____________________________________________________________________  Genitourinary:  []  frequency  []  polyuria  []  nocturia  []  hesitancy  []  urgency  []  hematuria  []  incontinence [] Other:  _____________________________________________________________________  Musculoskeletal ROS:  []  muscle or joint pain  []  stiffness  [] arthritis  []  gout  []  weakness  []  redness  []  swelling  []  instability [] Other:  _____________________________________________________________________  Endocrine ROS:  []  heat/cold intolerance  []  sweating  []  excessive thirst or hunger [] Other:  _____________________________________________________________________  Neurological ROS:  []  Seizures  []  numbness  []  tingling  []  fainting  []  burning  []  tremors [] Other:  _____________________________________________________________________  Psych ROS:  []  Anxiety  []  depression  []  abnormal thoughts [] Other:  _____________________________________________________________________  Dermatological ROS:  []  Rash  []  sores  []  lumps  []  skin changes  []  changes to hair or nails [] Other:  _____________________________________________________________________    Past Medical History:        Diagnosis Date    [facility]     C. difficile colitis     history of/  negative specimen 4/12/2018    Cardi[facility]     Chemotherapy     [facility]     COPD [facility]chronic obstructive [facility] disease) [facility]     Dependence on supplemental oxygen     2LNC    Hypertension     Hypothyroidism     Osteoarthritis     Rosacea        Past Surgical History:        Procedure Laterality Date    BREAST SURGERY      Left breast lumpectomy    BUNIONECTOMY Right     CATARACT REMOVAL Bilateral     COLONOSCOPY  2011    EYE SURGERY      FOOT SURGERY      toe    HIP ARTHROPLASTY Right 09/12/2017    right bipolar hip    MOHS SURGERY  7/2012    PARATHYROIDECTOMY      [place] LEFT HEART CATH,PERCUTANEOUS      Cardiac Cath, Left Heart       Allergies:  Adhesive tape    Past medical and surgical history reviewed. Any changes have been noted. Scheduled and prn Medications:    Scheduled Meds:   mometasone-formoterol  2 puff Inhalation BID    Oyster Shell Calcium/D  1 tablet Oral Daily    levothyroxine  100 mcg Oral Daily    docusate sodium  100 mg Oral Daily    metoprolol succinate  25 mg Oral Daily    sodium chloride flush  10 mL Intravenous 2 times per day    furosemide  40 mg Intravenous BID    enoxaparin  30 mg Subcutaneous Daily    tiotropium  18 mcg Inhalation Daily     Continuous Infusions:  PRN Meds:. HYDROcodone-acetaminophen, guaiFENesin, albuterol sulfate HFA, sodium chloride flush, ondansetron    PHYSICAL EXAM:  BP (!) 92/55   Pulse 95   Temp 98.7 °F (37.1 °C) (Oral)   Resp 18   Ht 4' 11\" (1.499 m)   Wt 121 lb 14.6 oz (55.3 kg)   SpO2 90%   BMI 24.62 kg/m²       Intake/Output Summary (Last 24 hours) at 10/16/18 1455  Last data filed at 10/16/18 1416   Gross per 24 hour   Intake             1210 ml   Output             1400 ml   Net             -190 ml       General: Alert and oriented. Resting in bed in no apparent distress. [place]. Pleasant and cooperative. Thin. HEENT: Normocephalic. Atraumatic. Pupils equal and reactive. EOM intact. Oral mucosa pink/moist/intact. O2 per NC  Neck: Supple. Symmetrical. Trachea midline. Lungs: Clear to auscultation bilaterally, diminished t/o. O2 per nasal canula. Respirations even and unlabored. Chest: Exam unremarkable. Cardiac: S1/S2 noted. Regular Rhythm and rate. Abdomen/GI: Soft. Non-tender. Non-distended. BS+. Extremities: PP+. Atraumatic. No redness/cyanosis. + 1 pitting edema BLE. Brisk cap refill. Skin: Dry and intact. No lesions noted. Neuro: Grossly intact. No focal deficits noted.      LABS:    Lab Results   Component Value Date    WBC 7.1 10/15/2018    HGB 9.6 (L) 10/15/2018    HCT 30.1 (L) 10/15/2018    MCV 83.4 10/15/2018     10/15/2018    LYMPHOPCT 7.6 10/13/2018    RBC 3.60 (L) 10/15/2018    MCH 26.7 10/15/2018    MCHC 32.0 10/15/2018    RDW 17.1 (H) 10/15/2018       Lab Results   Component Value Date    CREATININE 1.2 10/16/2018    BUN 54 (H) 10/16/2018     10/16/2018    K 3.7 10/16/2018    CL 96 (L) 10/16/2018    CO2 32 10/16/2018       Lab Results   Component Value Date    MG 2.00 10/15/2018       Lab Results   Component Value Date    ALT 29 10/13/2018    AST 33 10/13/2018    GGT 24 02/15/2011    ALKPHOS 118 10/13/2018    BILITOT 0.4 10/13/2018        No flowsheet data found. Imaging:  CT CHEST WO CONTRAST   Final Result   Moderate bilateral pleural effusions with adjacent airspace disease, with   alveolar ground-glass opacity identified elsewhere within the lungs. Findings are most compatible with pulmonary edema. Correlate with any   clinical evidence of superimposed pneumonia or aspiration. XR CHEST PORTABLE   Final Result   Similar findings most suggestive of mild vascular congestive changes with   moderate bilateral pleural effusions and likely associated atelectasis. Underlying pneumonia is not excluded. Continued imaging follow-up is   recommended. Assessment & Plan:      Acute on chronic combined CHF exacerbation, NYHA class IV on admission  7/2018 echo with LVEF of 35%, severe TR, mild pulm HTN  IV lasix BID  Monitor intake/output  Fluid restriction  Cardiology and CHF coordinator consulted. Appreciate recs  Eason cath placed per cardiology  Strict I&Os  BMP daily    COPD without acute exacerbation  Continue home regimen and supplemental O2.     Acute on chronic hypoxic respiratory failure  2/2 CHF exacerbation  Supplemental O2, titrate to maintain O2 sat of 90% or greater  Treat CHF as above    Chronic Afib  Rate controlled with metoprolol  AC was held for skin

## 2018-10-16 NOTE — PLAN OF CARE
Problem: Falls - Risk of:  Goal: Will remain free from falls  Will remain free from falls   Outcome: Ongoing    Goal: Absence of physical injury  Absence of physical injury   Outcome: Ongoing      Problem: Pain:  Goal: Pain level will decrease  Pain level will decrease   Outcome: Ongoing    Goal: Control of acute pain  Control of acute pain   Outcome: Ongoing    Goal: Control of chronic pain  Control of chronic pain   Outcome: Ongoing      Problem: OXYGENATION/RESPIRATORY FUNCTION  Goal: Patient will maintain patent airway  Outcome: Ongoing    Goal: Patient will achieve/maintain normal respiratory rate/effort  Respiratory rate and effort will be within normal limits for the patient   Outcome: Ongoing      Problem: HEMODYNAMIC STATUS  Goal: Patient has stable vital signs and fluid balance  Outcome: Ongoing

## 2018-10-16 NOTE — PROGRESS NOTES
Education & Training, Safety Education & Training  Safety Devices  Type of devices: Call light within reach, Chair alarm in place, Gait belt, Patient at risk for falls, Left in chair, Nurse notified Cr Webb RN notified)    G-Code  PT G-Codes  Functional Assessment Tool Used: am-pac IP mob  Score: 18  Functional Limitation: Mobility: Walking and moving around  Mobility: Walking and Moving Around Current Status (): At least 40 percent but less than 60 percent impaired, limited or restricted  Mobility: Walking and Moving Around Goal Status (): At least 20 percent but less than 40 percent impaired, limited or restricted    AM-PAC Score  AM-PAC Inpatient Mobility Raw Score : 18  AM-PAC Inpatient T-Scale Score : 43.63  Mobility Inpatient CMS 0-100% Score: 46.58  Mobility Inpatient CMS G-Code Modifier : CK          Goals  Short term goals  Time Frame for Short term goals: upon d/c  Short term goal 1: Transfers sit <> stand with SBA. Short term goal 2: Ambulate with wheeled walker 25 feet with SBA.   Patient Goals   Patient goals : to get out of bed, walk and do exercises so she can go home       Therapy Time   Individual Concurrent Group Co-treatment   Time In 0917         Time Out 1010         Minutes 53         Timed Code Treatment Minutes: 38 Minutes       Electronically signed by Radha Harrison, PT [id number] on 10/16/2018 at 10:19 AM

## 2018-10-17 PROBLEM — I95.9 HYPOTENSION: Status: ACTIVE | Noted: 2018-01-01

## 2018-10-17 NOTE — PROGRESS NOTES
Occupational Therapy  Facility/Department: [facility] MED SURG  Daily Treatment Note  NAME: Chelsea Broussard  : 3/11/1930  MRN: [id number]    Date of Service: 10/17/2018    Discharge Recommendations:  Home with nursing aide, S Level 3, Home with Home health OT, 24 hour supervision or assist    Chelsea Broussard scored a 18/24 on the AM-PAC ADL Inpatient form. Current research shows that an AM-PAC score of 18 or greater is typically associated with a discharge to the patient's home setting. Based on the patients AM-PAC score and their current ADL deficits, it is recommended that the patient have 2-3 sessions per week of Occupational Therapy at d/c to increase the patients independence. Patient Diagnosis(es): The primary encounter diagnosis was Chronic congestive heart failure, unspecified heart failure type ([address] Diagnoses of Chronic obstructive pulmonary disease, unspecified COPD type ([address] and Hypoxemia were also pertinent to this visit. has a past medical history of Atrial fibrillation ([address] C. difficile colitis; Cardiomyopathy ([address] Chemotherapy; CHF (congestive heart failure) (HCC); COPD (chronic obstructive pulmonary disease) ([address] Dependence on supplemental oxygen; Hypertension; Hypothyroidism; Osteoarthritis; and Rosacea. has a past surgical history that includes Breast surgery; parathyroidectomy; Foot surgery; pr left heart cath,percutaneous; Mohs surgery (2012); Hip Arthroplasty (Right, 2017); Bunionectomy (Right); Colonoscopy (); Eye surgery; and Cataract removal (Bilateral).     Restrictions  Restrictions/Precautions  Restrictions/Precautions: Fall Risk  Position Activity Restriction  Other position/activity restrictions: 4 L O2; wears 2 L O2 at home; fluid restriction  Subjective   General  Chart Reviewed: Yes  Patient assessed for rehabilitation services?: Yes  Additional Pertinent Hx: Per [facility] Pa NP's note 10/14/18: \"The patient is an 80year old,  female,

## 2018-10-17 NOTE — PROGRESS NOTES
Dressing to left forehead changed. Old dressing removed, replaced. Left forearm skin tear, nearly healed, applied non stick dressing and boarder meplex. Pt also asked that her upper forearm where a skin cancer lesion was removed to be covered with non stick dressing and wrapped with kerlex.

## 2018-10-17 NOTE — CARE COORDINATION
SOCIAL WORK ASSESSMENT 10-17-18      DISCHARGE GOAL:   To return home where she lives with her daughter,Danielle ( [phone]). The patient asked me to update Tere, but her phone did not accept calls from this number, nor could I leave a message. HOME SITUATION:    WITH WHOM DOES THE PATIENT LIVE? ARE THEY SUPPORTIVE? Tere is on disability and is with patient unless she is running errands, etc.          ARE THEY ABLE TO GET AROUND THE HOME/ANY STEP? Pt uses a rolling walker. See therapy evals /  She wears O2 continuously at 2l. PRIOR LEVEL OF FUNCTIONING:         PERSONAL CARE/ADLS:     She has a home health aide through [facility] that comes for 2 hours a week for housekeeping. Pt does not have a lifeline, states she doesn't need one. (?)  Tere assists her with ADLS. MEALS: daughter      DRIVES no      ERRANDS/GROCERY SHOPS:daughter ,Tere. Pt does have a portable O2 tank. DME CURRENTLY AT HOME:        CURRENT HOME CARE/SERVICES: therapy is rec HC level 3  WILL NEED HOME CARE ORDERS AND JUSTIN PER MD.    PREFERRED HOME CARE: pt has had [facility] in the past. She agrees to resume. A referral was called and faxed. They can accept this pt. Contact information for case management provided to pt. Malvin Boogie R.N.     [phone]

## 2018-10-17 NOTE — PROGRESS NOTES
issues, unknown    Cancer Brother         skin    Parkinsonism Brother     Asthma Brother       Social History   Substance Use Topics    Smoking status: Former Smoker     Types: Cigarettes     Quit date: 12/13/1980    Smokeless tobacco: Never Used    Alcohol use No     Allergies   Allergen Reactions    Adhesive Tape       ferrous sulfate  324 mg Oral Daily with breakfast    furosemide  60 mg Intravenous BID    mometasone-formoterol  2 puff Inhalation BID    Oyster Shell Calcium/D  1 tablet Oral Daily    levothyroxine  100 mcg Oral Daily    docusate sodium  100 mg Oral Daily    metoprolol succinate  25 mg Oral Daily    sodium chloride flush  10 mL Intravenous 2 times per day    enoxaparin  30 mg Subcutaneous Daily    tiotropium  18 mcg Inhalation Daily       Review of Systems -   Constitutional: Negative for weight gain/loss; malaise, fever  Respiratory: Negative for Asthma;  cough and hemoptysis  Cardiovascular: Negative for palpitations,dizziness   Gastrointestinal: Negative for abd.pain; constipation/diarrhea;    Genitourinary: Negative for stones; hematuria; frequency hesitancy  Integumentt: Negative for rash or pruritis  Hematologic/lymphatic: Negative for blood dyscrasia; leukemia/lymphoma  Musculoskeletal: Negative for Connective tissue disease  Neurological:  Negative for Seizure   Behavioral/Psych:Negative for Bipolar disorder, Schizophrenia; Dementia  Endocrine: negative for thyroid, parathyroid disease      Intake/Output Summary (Last 24 hours) at 10/17/18 1748  Last data filed at 10/17/18 1451   Gross per 24 hour   Intake              600 ml   Output             1300 ml   Net             -700 ml       Physical Examination:    /67   Pulse 96   Temp 98 °F (36.7 °C) (Axillary)   Resp 20   Ht 4' 11\" (1.499 m)   Wt 121 lb 7.6 oz (55.1 kg)   SpO2 93%   BMI 24.53 kg/m²    HEENT:  Face: Atraumatic, Conjunctiva: Pink; non icteric,  Mucous Memb:  Moist, No thyromegaly or X-Ray:  Similar findings most suggestive of mild vascular congestive changes with   moderate bilateral pleural effusions and likely associated atelectasis. Underlying pneumonia is not excluded.  Continued imaging follow-up is   recommended. Corornary angiogram  & Intervention:2/10/2011  1. The patient has apical cardiomyopathy, with a large area of the left ventricle, almost two-thirds of it, which is dyskinetic. 2.  Ejection fraction is 25%. 3.  No obstructive coronary artery disease. 4.  Elevated left heart filling pressures of 25 mmHg. ECHO: 7/16/18  Normal LV size with EF 35%. Global LV dysfunction. Trivial mitral regurgitation is present. Severe biatrial enlargement  The right ventricle is normal in size and function. Severe tricuspid regurgitation. Systolic pulmonary artery pressure (SPAP) is estimated at 42 mmHg consistent  with mild pulmonary hypertension (RA pressure 3 mmHg). There is a large left pleural effusion. ASSESSMENT AND PLAN:      Acute on chronic combined systolic and diastolic HF  IV Lasix to increase to 60 twice a day  Modest response          Germaine MANCIA  10/17/2018

## 2018-10-17 NOTE — PROGRESS NOTES
REMOVAL Bilateral     COLONOSCOPY  2011    EYE SURGERY      FOOT SURGERY      toe    HIP ARTHROPLASTY Right 09/12/2017    right bipolar hip    MOHS SURGERY  7/2012    PARATHYROIDECTOMY      [place] LEFT HEART CATH,PERCUTANEOUS      Cardiac Cath, Left Heart       Allergies:  Adhesive tape    Past medical and surgical history reviewed. Any changes have been noted. Scheduled and prn Medications:    Scheduled Meds:   ferrous sulfate  324 mg Oral Daily with breakfast    furosemide  60 mg Intravenous BID    mometasone-formoterol  2 puff Inhalation BID    Oyster Shell Calcium/D  1 tablet Oral Daily    levothyroxine  100 mcg Oral Daily    docusate sodium  100 mg Oral Daily    metoprolol succinate  25 mg Oral Daily    sodium chloride flush  10 mL Intravenous 2 times per day    enoxaparin  30 mg Subcutaneous Daily    tiotropium  18 mcg Inhalation Daily     Continuous Infusions:  PRN Meds:. HYDROcodone-acetaminophen, guaiFENesin, albuterol sulfate HFA, sodium chloride flush, ondansetron    PHYSICAL EXAM:  /67   Pulse 96   Temp 98 °F (36.7 °C) (Axillary)   Resp 20   Ht 4' 11\" (1.499 m)   Wt 121 lb 7.6 oz (55.1 kg)   SpO2 93%   BMI 24.53 kg/m²       Intake/Output Summary (Last 24 hours) at 10/17/18 1453  Last data filed at 10/17/18 1451   Gross per 24 hour   Intake              600 ml   Output             1925 ml   Net            -1325 ml       General: Alert and oriented. Resting in bed in no apparent distress. [place]. Pleasant and cooperative. Thin. HEENT: Normocephalic. Atraumatic. Pupils equal and reactive. EOM intact. Oral mucosa pink/moist/intact. O2 per NC  Neck: Supple. Symmetrical. Trachea midline. Lungs: Clear to auscultation bilaterally, diminished t/o. O2 per nasal canula. Respirations even and unlabored. Chest: Exam unremarkable. Cardiac: S1/S2 noted. Regular Rhythm and rate. Abdomen/GI: Soft. Non-tender. Non-distended. BS+. Extremities: PP+. Atraumatic. No redness/cyanosis.  + 1

## 2018-10-18 NOTE — PROGRESS NOTES
Brother         GI issues, unknown    Cancer Brother         skin    Parkinsonism Brother     Asthma Brother       Social History   Substance Use Topics    Smoking status: Former Smoker     Types: Cigarettes     Quit date: 12/13/1980    Smokeless tobacco: Never Used    Alcohol use No     Allergies   Allergen Reactions    Adhesive Tape       ferrous sulfate  324 mg Oral Daily with breakfast    furosemide  60 mg Intravenous BID    mometasone-formoterol  2 puff Inhalation BID    Oyster Shell Calcium/D  1 tablet Oral Daily    levothyroxine  100 mcg Oral Daily    docusate sodium  100 mg Oral Daily    metoprolol succinate  25 mg Oral Daily    sodium chloride flush  10 mL Intravenous 2 times per day    enoxaparin  30 mg Subcutaneous Daily    tiotropium  18 mcg Inhalation Daily       Review of Systems -   Constitutional: Negative for weight gain/loss; malaise, fever  Respiratory: Negative for Asthma;  cough and hemoptysis  Cardiovascular: Negative for palpitations,dizziness   Gastrointestinal: Negative for abd.pain; constipation/diarrhea;    Genitourinary: Negative for stones; hematuria; frequency hesitancy  Integumentt: Negative for rash or pruritis  Hematologic/lymphatic: Negative for blood dyscrasia; leukemia/lymphoma  Musculoskeletal: Negative for Connective tissue disease  Neurological:  Negative for Seizure   Behavioral/Psych:Negative for Bipolar disorder, Schizophrenia; Dementia  Endocrine: negative for thyroid, parathyroid disease      Intake/Output Summary (Last 24 hours) at 10/18/18 0936  Last data filed at 10/18/18 0850   Gross per 24 hour   Intake              840 ml   Output             2700 ml   Net            -1860 ml       Physical Examination:    BP 99/64   Pulse 105   Temp 98 °F (36.7 °C) (Oral)   Resp 16   Ht 4' 11\" (1.499 m)   Wt 121 lb 7.6 oz (55.1 kg)   SpO2 94%   BMI 24.53 kg/m²    HEENT:  Face: Atraumatic, Conjunctiva: Pink; non icteric,  Mucous Memb:  Moist, No thyromegaly or

## 2018-10-18 NOTE — PROGRESS NOTES
on 2L O2 per NC at baseline, who presented to [facility] with complaints of SOB and BLE edema. The patient's O2 saturation was 88% on 2L O2. In the ER, a chest CT showed pulmonary edema. The patient was admitted for acute CHF exacerbation. \"   Family / Caregiver Present: No  Referring Practitioner: Medley  Diagnosis: CHF exacerbation  Subjective  Subjective: Pt seen Bs, in bed and agreeable to OT. Pt requesting to use the commode  Pain Assessment  Patient Currently in Pain: No  Vital Signs  Patient Currently in Pain: No   Orientation  Orientation  Overall Orientation Status: Within Functional Limits  Objective    ADL  Grooming: Modified independent  (seted fro chair at sink-declines standing- to wash hands and brush dentures)  Toileting: Stand by assistance (cleans self and manages clothes (gown), then asks for assist to thoroughly clean posterior after BM)        Standing Balance  Time: 1-2 min  Activity: stance for ADL's. Pt amb short distance in room-doorway from BR then to bed. Difficulty obtaining 02 sats, dt finger cold  Functional Mobility  Functional - Mobility Device: Rolling Walker  Activity: To/from bathroom  Assist Level: Stand by assistance  Toilet Transfers  Toilet - Technique: Ambulating  Equipment Used: Grab bars  Toilet Transfer: Stand by assistance  Toilet Transfers Comments: RW; cues for hand placement  Wheelchair Bed Transfers  Wheelchair/Bed - Technique: Ambulating  Equipment Used: Bed (chiar with arms near sink)  Level of Asssistance: Stand by assistance  Wheelchair Transfers Comments: RW  Bed mobility  Supine to Sit: Stand by assistance (HOB slightly elevated)  Sit to Supine: Contact guard assistance;Stand by assistance (bed flat, no rail. Asks for help with LE's)      Cognition  Overall Cognitive Status: WFL   Assessment   Performance deficits / Impairments: Decreased functional mobility ; Decreased ADL status; Decreased endurance  Assessment: Pt tolerted treatment fair this date.  Pt c/o more

## 2018-10-18 NOTE — PROGRESS NOTES
[] ABD pain  []  nausea  []  vomiting  []  indigestion  []  diarrhea  []  constipation [] Other:  _____________________________________________________________________  Genitourinary:  []  frequency  []  polyuria  []  nocturia  []  hesitancy  []  urgency  []  hematuria  []  incontinence [] Other:  _____________________________________________________________________  Musculoskeletal ROS:  []  muscle or joint pain  []  stiffness  [] arthritis  []  gout  []  weakness  []  redness  []  swelling  []  instability [] Other:  _____________________________________________________________________  Endocrine ROS:  []  heat/cold intolerance  []  sweating  []  excessive thirst or hunger [] Other:  _____________________________________________________________________  Neurological ROS:  []  Seizures  []  numbness  []  tingling  []  fainting  []  burning  []  tremors [] Other:  _____________________________________________________________________  Psych ROS:  []  Anxiety  []  depression  []  abnormal thoughts [] Other:  _____________________________________________________________________  Dermatological ROS:  []  Rash  []  sores  []  lumps  []  skin changes  []  changes to hair or nails [] Other:  _____________________________________________________________________    Past Medical History:        Diagnosis Date    [facility]     C. difficile colitis     history of/  negative specimen 4/12/2018    Cardiomyopathy [facility]     Chemotherapy     [facility]congestive [facility] failure) [facility]     [facility]     Dependence on supplemental oxygen     2LNC    Hypertension     Hypothyroidism     Osteoarthritis     Rosacea        Past Surgical History:        Procedure Laterality Date    BREAST SURGERY      Left breast lumpectomy    BUNIONECTOMY Right     CATARACT REMOVAL Bilateral     COLONOSCOPY  2011    EYE SURGERY      FOOT SURGERY      toe    HIP ARTHROPLASTY Right or greater  Treat CHF as above    Chronic Afib  Rate controlled with metoprolol  AC was held for skin surgery    HTN  Lasix, lisinopril, metoprolol  Monitor BP  Titrate medications as needed    Hypothyroidism  Levothyroxine    Hypotension   Toprol XL 25 mg extended release hold for SBP  less than 110 or HR less than 60    Iron deficiency anemia  Ferritin 123.5  Ferrous sulfate  324 mg po daily  with breakfast     Body mass index is 24.53 kg/m². The patient was informed of the results of any tests, a time was given to answer questions, a plan was proposed and they agreed with plan. DVT prophylaxis: [x] Lovenox  [] SQ Heparin  [] SCDs because of  [] warfarin/oral direct thrombin inhibitor [] Encourage ambulation    GI prophylaxis: [] PPI/[id number]  [x] not indicated    Probiotic if on abx: [] Yes [] No [x] Not Indicated    Diet: DIET CARDIAC; Low Sodium (2 GM);  Daily Fluid Restriction: 1800 ml    Consults:  IP CONSULT TO PRIMARY CARE PROVIDER  IP CONSULT TO HOSPITALIST  IP CONSULT TO HEART FAILURE NURSE/COORDINATOR  IP CONSULT TO DIETITIAN  IP CONSULT TO CARDIOLOGY  IP CONSULT TO HEART FAILURE NURSE/COORDINATOR    Disposition:  [] Home  [x] Home with home health [] Rehab [] Psych [] SNF  [] LTAC  [] Long term nursing home or group home [] Transfer to ICU  [] Transfer to PCU [] Other:    Code Status: DNR-CCA    ELOS: per cardiology recSURESH Macario CNP  10/18/18

## 2018-10-19 NOTE — DISCHARGE SUMMARY
every 8 hours as needed for Pain. .      OXYGEN 2 L/min continuous       Multiple Vitamins-Minerals (MULTI FOR HER 50+ PO) Take 1 tablet by mouth daily              Time Spent on discharge is more than 30 minutes in the examination, evaluation, counseling and review of medications and discharge plan. Signed:    SURESH Cardozo - FABIOLA   10/19/2018    Thank you Clarence Coronado MD for the opportunity to be involved in this patient's care. If you have any questions or concerns please feel free to contact me at [phone].

## 2018-10-19 NOTE — PROGRESS NOTES
mobility and fxl transfers to/from ADL surfaces with supervision using AD. Short term goal 4: Pt will tolerate 10-15 minutes of B UE therex to improve strength/endurance for ADLs. Short term goal 5: Pt will tolerate 2-4  minutes of standing activity at sink for ADLs while maintaining O2 sats at/above 90%. Patient Goals   Patient goals : to return home. Therapy Time   Individual Concurrent Group Co-treatment   Time In 2740         Time Out 1530         Minutes 16               This note to serve as OT d/c summary if pt is d/c-ed prior to next therapy session.     Lyndsay Killian, OTR/L [id number]

## 2018-10-19 NOTE — PROGRESS NOTES
 Other Brother         GI issues, unknown    Cancer Brother         skin    Parkinsonism Brother     Asthma Brother       Social History   Substance Use Topics    Smoking status: Former Smoker     Types: Cigarettes     Quit date: 12/13/1980    Smokeless tobacco: Never Used    Alcohol use No     Allergies   Allergen Reactions    Adhesive Tape       ferrous sulfate  324 mg Oral Daily with breakfast    furosemide  60 mg Intravenous BID    mometasone-formoterol  2 puff Inhalation BID    Oyster Shell Calcium/D  1 tablet Oral Daily    levothyroxine  100 mcg Oral Daily    docusate sodium  100 mg Oral Daily    metoprolol succinate  25 mg Oral Daily    sodium chloride flush  10 mL Intravenous 2 times per day    enoxaparin  30 mg Subcutaneous Daily    tiotropium  18 mcg Inhalation Daily       Review of Systems -   Constitutional: Negative for weight gain/loss; malaise, fever  Respiratory: Negative for Asthma;  cough and hemoptysis  Cardiovascular: Negative for palpitations,dizziness   Gastrointestinal: Negative for abd.pain; constipation/diarrhea;    Genitourinary: Negative for stones; hematuria; frequency hesitancy  Integumentt: Negative for rash or pruritis  Hematologic/lymphatic: Negative for blood dyscrasia; leukemia/lymphoma  Musculoskeletal: Negative for Connective tissue disease  Neurological:  Negative for Seizure   Behavioral/Psych:Negative for Bipolar disorder, Schizophrenia; Dementia  Endocrine: negative for thyroid, parathyroid disease      Intake/Output Summary (Last 24 hours) at 10/19/18 0916  Last data filed at 10/19/18 0520   Gross per 24 hour   Intake              960 ml   Output             2700 ml   Net            -1740 ml       Physical Examination:    /70   Pulse 91   Temp 97.8 °F (36.6 °C) (Oral)   Resp 18   Ht 4' 11\" (1.499 m)   Wt 121 lb 4.1 oz (55 kg)   SpO2 93%   BMI 24.49 kg/m²    HEENT:  Face: Atraumatic, Conjunctiva: Pink; non icteric,  Mucous Memb:  Moist, No X-Ray:  Similar findings most suggestive of mild vascular congestive changes with   moderate bilateral pleural effusions and likely associated atelectasis. Underlying pneumonia is not excluded.  Continued imaging follow-up is   recommended. Corornary angiogram  & Intervention:2/10/2011  1. The patient has apical cardiomyopathy, with a large area of the left ventricle, almost two-thirds of it, which is dyskinetic. 2.  Ejection fraction is 25%. 3.  No obstructive coronary artery disease. 4.  Elevated left heart filling pressures of 25 mmHg. ECHO: 7/16/18  Normal LV size with EF 35%. Global LV dysfunction. Trivial mitral regurgitation is present. Severe biatrial enlargement  The right ventricle is normal in size and function. Severe tricuspid regurgitation. Systolic pulmonary artery pressure (SPAP) is estimated at 42 mmHg consistent  with mild pulmonary hypertension (RA pressure 3 mmHg). There is a large left pleural effusion. ASSESSMENT AND PLAN:      Acute on chronic combined systolic and diastolic HF  IV Lasix to increase to 60 twice a day  Fluid balance: -4905  Breathing at baseline  Will order pro-BNP    I think she can be discharged today. Would like to increase the dose of torsemide to 20 mg daily and 20 mg twice a day Monday, Wednesday and Fridays          CRISSY MANCIA  10/19/2018

## 2018-11-05 NOTE — PROGRESS NOTES
1 tablet by mouth daily       [facility] Natural Products (OSTEO BI-FLEX JOINT SHIELD) TABS Take 1 tablet by mouth daily      BOSWELLIA MESFIN PO Take by mouth      COLLAGEN PO Take 10 mg by mouth daily      levothyroxine (SYNTHROID) 100 MCG tablet Take 1 tablet by mouth Daily 30 tablet 0    guaiFENesin (MUCINEX) 600 MG extended release tablet Take 1,200 mg by mouth 2 times daily as needed for Congestion      fluticasone-salmeterol (ADVAIR HFA) 115-21 MCG/ACT inhaler Inhale 2 puffs into the lungs 2 times daily      Calcium Carbonate-Vitamin D (CALCIUM-VITAMIN D) 500-200 MG-UNIT per tablet Take 1 tablet by mouth daily      calcium carbonate (TUMS EX) 750 MG chewable tablet Take 1 tablet by mouth daily      metroNIDAZOLE (METROGEL) 0.75 % gel Apply topically 2 times daily Apply to face 2 times daily.  Cholecalciferol 2000 units TABS Take 2,000 Units by mouth daily       HYDROcodone-acetaminophen (NORCO) 5-325 MG per tablet Take 1 tablet by mouth every 8 hours as needed for Pain. Whitney Ross OXYGEN 2 L/min continuous       Multiple Vitamins-Minerals (MULTI FOR HER 50+ PO) Take 1 tablet by mouth daily        No current facility-administered medications for this visit. Physical Exam:   /84 (Site: Right Upper Arm, Position: Sitting)   Pulse 65   Ht 4' 11\" (1.499 m)   Wt 114 lb (51.7 kg)   SpO2 99%   Breastfeeding? No   BMI 23.03 kg/m²   Wt Readings from Last 2 Encounters:   11/05/18 114 lb (51.7 kg)   10/19/18 112 lb 7 oz (51 kg)     Constitutional: She is oriented to person, place, and time. She appears chronically ill in appearance. In no acute distress. +[place]. O2 in place. HEENT: Normocephalic and atraumatic. Sclerae anicteric. No xanthelasmas. Neck: Neck supple. No JVD present. Carotids without bruits. Cardiovascular: irreg, irreg; normal S1 and S2; I/VI systolic murmur  Pulmonary/Chest: Effort normal.  Lungs with mildly diminished breath sounds posteriorly.  + kyphosis  Abdominal: soft,

## 2018-11-20 NOTE — CARE COORDINATION
[facility] Transitions Follow Up Call    2018    Patient: Lesley Ramsey  Patient : 3/11/1930   MRN: <[id number]>  Reason for Admission: There are no discharge diagnoses documented for the most recent discharge. Discharge Date: 10/19/18 RARS: Readmission Risk Score: 35       Spoke with: Daughter Tere    Care Transitions Subsequent and Final Call    Subsequent and Final Calls  Do you have any ongoing symptoms?:  No  Have your medications changed?:  No  Do you have any questions related to your medications?:  No  Do you currently have any active services?:  Yes  Are you currently active with any services?:  Home Health  Do you have any needs or concerns that I can assist you with?:  No  Identified Barriers:  None  Care Transitions Interventions  Other Interventions:          CTC spoke to pt's daughter Lucía Briggs for BPCI f/u call. Stated pt is doing fine at home, lives with daughter and has [facility] RN visits weekly. Pt just completed PT/OT at home. Weight today was stable at 115.1. Pt continues to limit fluids and sodium. Pt is on 2 LPM supplemental 02 and has inhaler as needed. Denies increased SOB, CP, palpitations, weakness, dizziness, edema. Pt recently had skin cancer lesion removed on forehead that daughter stated \"went all the way to the bone\". Stated she has appt to have lesion on arm removed also. Pt has all her medications and daughter has no questions or concerns at this time. Will continue to follow for BPCI calls.     Aroldo Graham RN BSN   Care Transitions Coordinator  [phone]     Follow Up  Future Appointments  Date Time Provider Kevin Haji   2019 1:00 PM Lenny Barnes MD [facility]       Aroldo Graham RN

## 2018-11-23 PROBLEM — R00.1 BRADYCARDIA: Status: ACTIVE | Noted: 2018-01-01

## 2018-11-23 NOTE — ED NOTES
Bed: A-17  Expected date:   Expected time:   Means of arrival: [facility]  Comments:  Helene Pelaez, hypotensive     Vella Spurling, RN  11/23/18 7778

## 2018-11-23 NOTE — H&P
palpitations, dyspnea on exercise, Lower extrimity edema (swelling),     GASTROINTESTINAL:       Dysphagia, Poor appetite,  Nausea, Vomiting, diarrhea, heartburn, abdominal pain. Blood in the stools, hematemesis. Pain with swallowing, constipation    GENITOURINARY:       Urinary frequency, hesitancy,  urgency, Dysuria, hematuria,  Urinary Incontinence. Urinary Retention. GYNECOLOGICAL: vaginal bleeding , vaginal discharge, menopause    MUSCULOSKELETAL:       joint swelling or stiffness, joint pain, muscle pain, balance problems, low back pain. NEUROLOGICAL:      Gait problems. Tremor. Dizziness. Pain and paresthesias, weakness in extremities. Seizures, memory loss    PSYCHLOGICAL:        Anxiety, depression    SKIN :      Rashes ulcers, skin color changes, easy bruisability, lymphadenopathy      Physical Exam:      Vitals: /88   Pulse 56   Temp 96 °F (35.6 °C)   Resp 27   Wt 126 lb 8.7 oz (57.4 kg)   SpO2 92%   BMI 25.56 kg/m²     Gen:          Alert and oriented x 3  Eyes: PERRL. No sclera icterus. No conjunctival injection. ENT: No discharge. Pharynx clear. External appearance of ears and nose normal.  Neck: Trachea midline. No obvious mass. Resp: by basilar crackles  CV: Regular rate. Regular rhythm. No murmur or rub. No edema. GI: Non-tender. Non-distended. No hernia. Skin: Warm, dry, normal texture and turgor. Lymph: No cervical LAD. No supraclavicular LAD. M/S: / Ext. No cyanosis. No clubbing. No joint deformity. Neuro: Moves all four extremities. CN 2-12 tested, no deficits noted. Peripheral pulses and capillary refill is intact. CBC:   Recent Labs      11/23/18   1040   WBC  3.6*   HGB  10.0*   PLT  108*     BMP:    Recent Labs      11/23/18   1040   NA  138   K  4.6   CL  99   CO2  28   BUN  70*   CREATININE  1.9*   GLUCOSE  124*     Hepatic: No results for input(s): AST, ALT, ALB, BILITOT, ALKPHOS in the last 72 hours.   Troponin: No results for input(s): TROPONINI in the last 72 hours. BNP: No results for input(s): BNP in the last 72 hours. INR: No results for input(s): INR in the last 72 hours. Lab Results   Component Value Date    LABA1C 6.6 02/15/2011           No results for input(s): CKTOTAL in the last 72 hours. -----------------------------------------------------------------    Chest x-ray consistent with CHF        Assessment / Plan     Acute systolic CHF  Patient received 1 L of fluid in the emergency room  Blood pressure has improved to some extent  Start patient on ProAmatine and and Lasix  Patient follows Dr. Dora Cruz cardiology      Acute renal failure  Likely prerenal  Hold lisinopril  Patient also reports low urine output  Get a renal ultrasound      bradycardia  Hold metoprolol and Zanaflex  Cardiology consulted      Hypothyroidism  E03.9  Continue on Synthroid          DVT and GI prophylaxis  Use heparin instead of Lovenox    Full Mirta Sicard M.D    This note was transcribed using [id number] [facility]. Please disregard any translational errors.

## 2018-11-23 NOTE — LETTER
Beneficiary Notification Letter     This [facility] Provider is Participating in an Innovative Payment and [address] [place] [address] [place] from [employer] Lopez:   [facility] is participating in a Medicare initiative called the [facility] for [address] [employer]. You are receiving this letter because your health care provider has identified you as a patient who is receiving care through this initiative. Health care providers participating in the [facility] [address] [employer] [address], including [facility], will work with Medicare to improve care for patients. Your Medicare rights have not been changed. You still have all the same Medicare rights and protections, including the right to choose which hospital, doctor, or other health care provider you see. However, because [facility] chose to participate in the [address] [place] [address], all Medicare beneficiaries who meet the eligibility criteria of this initiative will receive care under the initiative. If you do not wish to receive care under the Bundled Payments [facility] [address] [employer] [address], you must choose a health care provider that does not participate in this initiative for your care. Regardless of which health care provider you see, Medicare will continue to cover all of your medically necessary services. Bundled Payments for Care Improvement Advanced aims to help improve your care     The Bundled Payments [facility] [address] [employer] [address] is an innovative Medicare initiative that encourages your doctors, hospitals, and other health care providers to work more closely together so you get better care during and following certain hospital stays.  In this initiative, doctors and hospitals may work closely with certain health care providers and suppliers that help patients recover after discharge from the hospital,

## 2018-11-24 PROBLEM — D72.819 LEUKOPENIA: Status: ACTIVE | Noted: 2018-01-01

## 2018-11-24 PROBLEM — D69.6 THROMBOCYTOPENIA (HCC): Status: ACTIVE | Noted: 2018-01-01

## 2018-11-24 PROBLEM — E44.0 MODERATE MALNUTRITION (HCC): Status: ACTIVE | Noted: 2018-01-01

## 2018-11-24 NOTE — CONSULTS
[address]                                  CONSULTATION    PATIENT NAME: Matt Mccoy                      :        1930  MED REC NO:   [id number]                          ROOM:       8812  ACCOUNT NO:   [de-identified]                           ADMIT DATE: 2018  PROVIDER:     Med Crawford MD    CARDIOLOGY CONSULTATION    CONSULT DATE:  2018    HISTORY OF PRESENT ILLNESS:  This is an 70-year-old female who is seen  in our office by Dr. Roopa Levy and Francy Fernando, presented to the hospital  with low blood pressure with low heart rate and she presented to the  hospital.  Her heart rate initially was in 20s and 30s with a low blood  pressure. She was given IV fluids with improvement in her symptoms. She currently denies having any chest tightness or pressure. She does  have some shortness of breath with minimal activity but no orthopnea or  PND. She also has lower extremity edema. She denied any fever, chills,  rigors, cough, or sputum production. REVIEW OF SYSTEMS:  Please see HPI. All other systems were reviewed and  they are negative. PAST MEDICAL HISTORY:  1. The patient has a history of heart failure with a last ejection  fraction of about 30% to 35% with a right ventricular enlargement. 2.  History of bradycardia and required in 2018 a transcutaneous  pacing. She is apparently back on her beta-blocker therapy at the  present time. 3.  History of septic shock. 4.  History of COPD.  5.  Osteoarthritis. 6.  Hypothyroidism. 7.  History of hypertension. PAST SURGICAL HISTORY:  She had a left breast lumpectomy, bunionectomy,  cataract removal, colonoscopy, eye surgery, foot surgery, right hip  arthroplasty, parathyroidectomy and coronary percutaneous intervention. SOCIAL HISTORY:  She lives at home with the family. No history of  smoking or alcohol abuse.     FAMILY

## 2018-11-24 NOTE — CARE COORDINATION
ADVANCED CARE PLANNING    Alicja Mark       :  3/11/1930              MRN:  [id number]      Purpose of Encounter: Advanced care planning in light of COPD exacerbation  Parties in attendance: :Faizan Cai, Vielka Quick MD,   Patient Decisional Capacity: Yes    Subjective/Patient Story: Patient understands that the functional status continues to deteriorate. Patient No longer wishes to continue with the resuscitative measures in case  Of Cardiopulmonary arrest          Goals of Care Determinations: Patient wishes Not to use  the life support measures,   In case of cardiac arrest.      Code Status: At this time patient wishes to be DNR CCA    Time Spent on Advanced Planning Documents: > 15 minutes    Advanced Care Planning Documents: Completed advances directives have been completed ---No        Electronically signed by Vileka Quick MD on 2018 at 12:23 AM  Thank you Leonardo Schultz MD for the opportunity to be involved in this patient's care. If you have any questions or concerns please feel free to contact me at [phone].

## 2018-11-24 NOTE — PROGRESS NOTES
(Doesn't have hearing aids with her. )     Subjective  General  Chart Reviewed: Yes  Patient assessed for rehabilitation services?: Yes  Additional Pertinent Hx: 79 y/o female admit 11/23/18 with NANCY, Bradycardia, Hypotension. PMH as noted including A-Fib, Cardiac Cath, Cardiomyopathy (EF 30-35%), CHF, COPD, Emphysema, Parathyroidectomy, OA R THR, Bunionectomy. Family / Caregiver Present: No  Referring Practitioner: Dr. Delia Clay. Diagnosis: NANCY, Bradycardia, Hypotension. Follows Commands: Within Functional Limits  Subjective  Subjective: Pt agreeable to PT Eval/Rx. Pain Screening  Patient Currently in Pain: Denies  Vital Signs  Patient Currently in Pain: Denies  Pre Treatment Pain Screening  Intervention List: Patient able to continue with treatment    Orientation  Orientation  Overall Orientation Status: Within Functional Limits  Social/Functional History  Social/Functional History  Lives With: Daughter (Dtr Freda Donnelly) - doesn't work, disability (back issues). )  Type of Home: House  Home Layout: One level, Able to Live on Main level with bedroom/bathroom  Home Access: Stairs to enter without rails, Ramped entrance (Ramp entrance. 1 step to enter from garage. )  Bathroom Shower/Tub: Tub/Shower unit  Bathroom Toilet: Handicap height  Bathroom Equipment: Grab bars in shower, Shower chair, Hand-held shower, Grab bars around toilet  Bathroom Accessibility: Harden Amarjit: Rolling walker, 4 wheeled walker, [address], Lift chair, Oxygen (W/C, Transport Chair. Home O2 2L via NC. )  Receives Help From: Family, Home health  ADL Assistance: Needs assistance (Dtr assists in/out shower. Pt showers and dresses independently.  )  Homemaking Assistance:  (Dtr, HHA (1x/wk) take care of homemaking needs. )  Ambulation Assistance: Independent (With [employer]. )  Transfer Assistance: Independent  Active : No  Mode of Transportation: Family  IADL Comments: Sleeps in [address]    Additional

## 2018-11-24 NOTE — PROGRESS NOTES
Occupational Therapy   Occupational Therapy Initial Assessment  Date: 2018   Patient Name: Sierra Gutierrez  MRN: [id number]     : 3/11/1930    Date of Service: 2018  Assessment: Pt is  79 y/o female admit 18 with NANCY, Bradycardia, Hypotension. Pt D/C from hospital  w/  Home care, RN sent pt back to hosp d/t weakness and hypotension. Pt is below baseline again but likely to be able to return home w assist of dtr and home care. Pt has mtor skills for ADLs and Mobility but not the activity tolerance yet. OT will follow while on acute care to address goals . and D/C plans. Discharge Recommendations:  Continue to assess pending progress, 24 hour supervision or assist, Patient would benefit from continued therapy after discharge (anticipate return home w/ home care)  OT Equipment Recommendations  Equipment Needed: No      Patient Diagnosis(es): The primary encounter diagnosis was NANCY (acute kidney injury) ([facility] [address] Diagnoses of Bradycardia and Acute pulmonary edema (HCC) were also pertinent to this visit. has a past medical history of Atrial fibrillation ([address] C. difficile colitis; Cardiomyopathy ([address] Chemotherapy; CHF (congestive heart failure) (HCC); COPD (chronic obstructive pulmonary disease) ([address] Dependence on supplemental oxygen; Hypertension; Hypothyroidism; Osteoarthritis; and Rosacea. has a past surgical history that includes Breast surgery; parathyroidectomy; Foot surgery; pr left heart cath,percutaneous; Mohs surgery (2012); Hip Arthroplasty (Right, 2017); Bunionectomy (Right); Colonoscopy (); Eye surgery; and Cataract removal (Bilateral). Restrictions  Restrictions/Precautions  Restrictions/Precautions: Fall Risk  Position Activity Restriction  Other position/activity restrictions: O2 2L via NC. Desat to 70% after amb 75 feet. RN increased O2 to 5L to return to 90%.  Pt having difficulty w/ proper purse lip breathing - if she exhaled first sats Accessibility: Accessible  Home Equipment: Rolling walker, 4 wheeled walker, Wheelchair-manual, Lift chair, Oxygen (W/C, Transport Chair. Home O2 2L via NC. )  Receives Help From: Family, Home health  ADL Assistance: Needs assistance (Dtr assists in/out shower. Pt showers and dresses independently.  )  Homemaking Assistance:  (Dtr, HHA (1x/wk) take care of homemaking needs. )  Ambulation Assistance: Independent (With [employer]. )  Transfer Assistance: Independent  Active : No  Mode of Transportation: Family  IADL Comments: Sleeps in [address] [place] [address]. Additional Comments: Dtr provides adequate assist/support. Pt rarely home alone (only when dtr to store/brief errands). Objective   Vision: Within Functional Limits  Hearing: Exceptions to [facility]  Hearing Exceptions: Hard of hearing/hearing concerns (Doesn't have hearing aids with her. )    Orientation  Overall Orientation Status: Within Functional Limits  Observation/Palpation  Observation: Dressing intact L Forehead, R Ant Thigh. (Pt reports lesion removed from forehead, R thigh donor graft site). Balance  Sitting Balance: Stand by assistance  Standing Balance: Contact guard assistance (CGA-SBA w/ RW)  Standing Balance  Activity: assited pt OOB , stood at EOB at [facility] , assist needed d/t multiple lines amb in room. Sit to stand: Contact guard assistance  Stand to sit: Contact guard assistance  Functional Mobility  Functional - Mobility Device: Rolling Walker  Assist Level: Contact guard assistance  Functional Mobility Comments: assist with lines and VC , amb approx 75 feet , otto dropped to 70% on 2 L O2 , RN in to chack on pt. Pt not c/o SOB until after she sat down to rest. Pt tends to hold her breath while amb. RN increased O2 to 5 L, pt coached thru PLB , Sats increasd to 90% after she was successful w/ PLB. Return to 2 L. Rested amb again 40 feet at easier pace sats dropped to 84% , didi to 90% on only 2 L once able to follow cues for PLB. Tolerance: Treatment limited secondary to medical complications (free text); Patient Tolerated treatment well (O2 otto dropped initially w/ ambulation, improved on second walk as her technique for PLB improved.)  Safety Devices  Safety Devices in place: Yes  Type of devices: Left in bed;Nurse notified;Call light within reach (all needs in place , temp in room increased.)         Plan   Plan  Times per week: 3=5  Current Treatment Recommendations: Balance Training, Functional Mobility Training, Endurance Training, Safety Education & Training, Self-Care / ADL, Patient/Caregiver Education & Training    G-Code  OT G-codes  Score: 14 CK  Functional Limitation: Self care  Self Care Current Status (): At least 40 percent but less than 60 percent impaired, limited or restricted  Self Care Goal Status (): At least 20 percent but less than 40 percent impaired, limited or restricted     OutComes Score  How much help for putting on and taking off regular lower body clothing?: A Lot  How much help for Bathing?: A Lot  How much help for Toileting?: Total  How much help for putting on and taking off regular upper body clothing?: A Lot  How much help for taking care of personal grooming?: A Little  How much help for eating meals?: None  AM-PAC Inpatient Daily Activity Raw Score: 14  AM-PAC Inpatient ADL T-Scale Score : 33.39  ADL Inpatient CMS 0-100% Score: 59.67  ADL Inpatient CMS G-Code Modifier : CK     AM-PAC Score     AM-PAC Inpatient Daily Activity Raw Score: 14  AM-PAC Inpatient ADL T-Scale Score : 33.39  ADL Inpatient CMS 0-100% Score: 59.67  ADL Inpatient CMS G-Code Modifier : CK    Goals  Short term goals  Time Frame for Short term goals: By D/C :  Short term goal 1: ADL transfer /mobil household distance w/ S-SBA and sats 90% or above  Short term goal 2: toilet/grooming in bathroom set-up -SBA  Short term goal 3: bath /dress Min A  Short term goal 4: Pt will return demo PLB appropriately with  cues.   Patient Goals   Patient goals : Go home with her daughter as PTA with home care services. Therapy Time   Individual Concurrent Group Co-treatment   Time In 1450         Time Out 1600         Minutes 70               If patient is discharged prior to next OT visit , refer to last OT progress note for Discharge Status.   Jalil Jones, LESLIE  LIC # [id number]

## 2018-11-24 NOTE — PROGRESS NOTES
Assessment:    Principal Problem:    Bradycardia  Active Problems:    Essential hypertension, benign    HTN (hypertension)    Acute on chronic respiratory failure with hypoxia (HCC)    Hypothyroid    Acute on chronic systolic heart failure (HCC)    Hypotension    Leukopenia    Thrombocytopenia (HCC)  Resolved Problems:    * No resolved hospital problems. *      Plan:  1. Bradycardia - BP and HR drops while sleeping - asymptomatic from this - avoid beta blocker  2. Acute on chronic systolic CHF - with EF of 30-35% - holding beta blocker due to bradycardia and ACE due to acute renal failure - continue with gentle diuresis - fluid restriction  3. Acute renal failure - improving with improved BP - baseline creatinine is 1.1  4. Acute on chronic resp failure - due to fluid overload - pt on baseline of 2.5L for COPD  5. Hypotension - chronic - continue with midodrine  6. COPD - continue with duljr powers spriva - add zyrtec for allergy sx - will check sputum cx due to leukopenia and thrombocytopenia  7.   Leukopenia and thrombocytopenia- new finding - will check sputum cx - and monitor daily - also mild thrombocytopenia - monitor closely        Prognosis:  Fair    Code status:  DNRCCA - do not intubate  DVT prophylaxis: [] Lovenox  [x] SQ Heparin  [] SCDs because  [] warfarin/oral direct thrombin inhibitor [] Encourage ambulation  GI prophylaxis: [] PPI/M2bhplcxk  [] not indicated  Diet:  DIET CARDIAC; Daily Fluid Restriction: 1500 ml  Disposition  Continue ICU    Medications:  Scheduled Meds:   cetirizine  5 mg Oral Daily    sodium chloride flush  10 mL Intravenous 2 times per day    oyster shell calcium/vitamin D  1 tablet Oral Daily    docusate sodium  100 mg Oral Daily    pantoprazole  40 mg Oral Dinner    mometasone-formoterol  2 puff Inhalation BID    levothyroxine  100 mcg Oral Daily    tiotropium  1 capsule Inhalation Daily    midodrine  2.5 mg Oral TID WC    furosemide  40 mg Intravenous Daily    metroNIDAZOLE   Topical TID    guaiFENesin  600 mg Oral BID    heparin (porcine)  5,000 Units Subcutaneous BID    ipratropium-albuterol  1 ampule Inhalation Q4H WA    bacitracin-polymyxin b   Topical Daily       PRN Meds:  sodium chloride flush, magnesium hydroxide, ondansetron, HYDROcodone 5 mg - acetaminophen, traMADol, acetaminophen    IV:        Intake/Output Summary (Last 24 hours) at 11/24/18 0711  Last data filed at 11/24/18 0557   Gross per 24 hour   Intake             1370 ml   Output             1810 ml   Net             -440 ml       Results:  CBC:   Recent Labs      11/23/18   1040  11/24/18   0421   WBC  3.6*  2.9*   HGB  10.0*  9.7*   HCT  31.0*  30.3*   MCV  85.1  84.8   PLT  108*  115*     BMP:   Recent Labs      11/23/18   1040  11/24/18   0421   NA  138  142   K  4.6  4.4   CL  99  101   CO2  28  27   BUN  70*  65*   CREATININE  1.9*  1.7*     Mag: No results for input(s): MAG in the last 72 hours. LIVER PROFILE: No results for input(s): AST, ALT, LIPASE, BILIDIR, BILITOT, ALKPHOS in the last 72 hours. Invalid input(s): AMYLASE,  ALB  PT/INR: No results for input(s): PROTIME, INR in the last 72 hours. APTT: No results for input(s): APTT in the last 72 hours.   UA:  Recent Labs      11/23/18   1147   COLORU  YELLOW   PHUR  6.0   CLARITYU  Clear   SPECGRAV  1.008   LEUKOCYTESUR  Negative   UROBILINOGEN  0.2   BILIRUBINUR  Negative   BLOODU  Negative   GLUCOSEU  Negative       ABG:   Lab Results   Component Value Date    PHART 7.442 04/10/2018    PH 7.348 02/16/2011    LEK4RML 36.1 04/10/2018    PO2ART 74.2 04/10/2018    JNJ8GLJ 24.3 04/10/2018    BEART 0.8 04/10/2018    JNF0QHK 25.4 04/10/2018    Z0FHMBNU 96.1 04/10/2018       Lab Results   Component Value Date    CALCIUM 9.0 11/24/2018    PHOS 2.4 (L) 04/17/2018             Electronically signed by Yaritza Nassar MD on 11/24/2018 at 7:11 AM

## 2018-11-25 NOTE — PROGRESS NOTES
Hospitalist ICU Progress Note    CC: Bradycardia    Hospital course:  79yo F with PMHx sig for afib, CHF chronic systolic with EF of 50-92%, COPD, oxygen dependent on 2.5L, HTN and hypothyroidism who presents with worsening shortness of breath. Pt was bradycardic to the 20s-30s in the ED as well and is usually on beta blocker therapy. Pt did receive 1L IVF in EF for hypotension and acute renal failure. Holding beta blocker, lisinopril at present time    Admit date: 11/23/2018  Days in hospital:  2    24 Hour Events: HR improved to the 60s-70s    Subjective: weak. Opens eyes    ROS:  A comprehensive review of systems was negative except for weakness    Objective:    BP (!) 85/47   Pulse 68   Temp 97 °F (36.1 °C) (Temporal)   Resp 21   Ht 4' 11\" (1.499 m)   Wt 117 lb 4.6 oz (53.2 kg)   SpO2 97%   BMI 23.69 kg/m²     Gen: frail, chronically ill appearing  HEENT: NC/AT, moist mucous membranes, no oropharyngeal erythema or exudate  Neck: supple, trachea midline, no anterior cervical or SC LAD  Heart:  Normal s1/s2, RRR, no murmurs, gallops, or rubs. 2+ pitting leg edema  Lungs:  Some rhonchi bilaterally, no wheeze, no rales, some rhonchi, no crackles, no use of accessory muscles  Abd: bowel sounds present, soft, nontender, nondistended, no masses  Extrem:  No clubbing, cyanosis,  2+ leg edema, peripheral pulses 1+, brisk capillary refill  Skin: no rashes or lesions, normal color/perfusion  Psych:  A & O x3    Neuro: grossly intact, moves all four extremities. Assessment:    Principal Problem:    Bradycardia  Active Problems:    Essential hypertension, benign    HTN (hypertension)    Acute on chronic respiratory failure with hypoxia (HCC)    Hypothyroid    Acute on chronic systolic heart failure (HCC)    Hypotension    Leukopenia    Thrombocytopenia (HCC)    Moderate malnutrition ([address]  Resolved Problems:    * No resolved hospital problems.

## 2018-11-25 NOTE — PROGRESS NOTES
Aware of HF RN consult received from Dr Juancho Torres as part of HF order set. Patient is a 30 day HF readmit (hospitalized 10/13 - 10/19) and was 30 day HF readmit prior to that  (hospitalized 9/18 - 9/20). Additionally, patient hospitalized 7/15 - 7/19. Patient has HF measures in place: daily weights, I/O, HF careplan and appropriate fluid and sodium restricted diet. HF instructions have been added to [facility]. HF RNs will conduct SM interview and provide HF education on 11/26 for this obviously HIGH RISK patient.

## 2018-11-25 NOTE — DISCHARGE INSTR - COC
Continuity of Care Form    Patient Name: Fredrik Severe   :  3/11/1930  MRN:  [id number]    [facility] date:  2018  Discharge date:  18    Code Status Order: Limited   Advance Directives:   [facility] Documentation     Date/Time Healthcare Directive Type of Healthcare Directive Copy in [address] Agent's Name Healthcare Agent's Phone Number    18 1108  Yes, patient has an advance directive for healthcare treatment  Durable power of  for health care;Living will  Yes, copy in chart  --  --  --    18 1652  --  --  --  --  Michael Ward and Cecelia Irving  see emergency contact info    18 2325  Yes, patient has an advance directive for healthcare treatment  Durable power of  for health care;Living will  Yes, copy in chart  --  --  --          Admitting Physician:  No admitting provider for patient encounter.   PCP: Clarence Coronado MD    Discharging Nurse: [facility] Unit/Room#: L7A-8466/7527-16  Discharging Unit Phone Number: [phone]    Emergency Contact:   Extended Emergency Contact Information  Primary Emergency Contact: EdDanielle  Address: [address] Phone: [phone]  Work Phone: [phone]  Relation: Child  Secondary Emergency Contact: Danielle Ward  Address: [address] [place] [address] Phone: [phone]  Work Phone: [phone]  Relation: Child    Past Surgical History:  Past Surgical History:   Procedure Laterality Date    BREAST SURGERY      Left breast lumpectomy    BUNIONECTOMY Right     CATARACT REMOVAL Bilateral     COLONOSCOPY      EYE SURGERY      FOOT SURGERY      toe    HIP ARTHROPLASTY Right 2017    right bipolar hip    MOHS SURGERY  2012    PARATHYROIDECTOMY      DC LEFT HEART CATH,PERCUTANEOUS      Cardiac Cath, Left Heart       Immunization

## 2018-11-26 NOTE — PROGRESS NOTES
1908: handoff report received from [facility]. Pt eating dinner. Daughter at bedside. 2026: assessment as noted. Pt is AOx4, VSS on 2L O2 NC. Pain 4/10. Repositioned pt. Pt satisfied with intervention done for pain. Scheduled medications given per order. No needs at this time. Will monitor. 0015: assessment remains unchanged. Repositioned pt. Will monitor. 0400: assessment as noted. Repositioned pt in bed. No needs at this time.

## 2018-11-26 NOTE — PROGRESS NOTES
Minutes     IF Pt. Is d/c'd prior to next session, this will serve as a d/c summary. Jennifer Barcenas.  Quan Ragland, [address] [facility], [id number]

## 2018-11-27 NOTE — PROGRESS NOTES
Physical Therapy  Facility/Department: [address] PROGRESSIVE CARE  Daily Treatment Note  NAME: Pantera Dueñas  : 3/11/1930  MRN: [id number]    Date of Service: 2018    Discharge Recommendations:  Patient would benefit from continued therapy after discharge, 2-3 sessions per week, S Level 1     Pan[employer] Fuel scored a 18/24 on the AM-PAC short mobility form. Current research shows that an AM-PAC score of 18 or greater is typically associated with a discharge to the patient's home setting. Based on the patients AM-PAC score and their current functional mobility deficits, it is recommended that the patient have 2-3 sessions per week of Physical Therapy at d/c to increase the patients independence. HOME HEALTH CARE: LEVEL 1 STANDARD     -Initial home health evaluation to occur within 24-48 hours, in patient home    -Home health agency to establish plan of care for patient over 60 day period    -Medication Reconciliation    -PCP Visit scheduled within seven days of discharge    -PT/OT to evaluate with goal of regaining prior level of functioning    -OT to evaluate if patient has [address] needs for personal care       Patient Diagnosis(es): The primary encounter diagnosis was NANCY (acute kidney injury) ([facility] [address] Diagnoses of Bradycardia and Acute pulmonary edema (HCC) were also pertinent to this visit. has a past medical history of Atrial fibrillation ([address] C. difficile colitis; Cardiomyopathy ([address] Chemotherapy; CHF (congestive heart failure) (HCC); COPD (chronic obstructive pulmonary disease) ([address] Dependence on supplemental oxygen; Hypertension; Hypothyroidism; Osteoarthritis; and Rosacea. has a past surgical history that includes Breast surgery; parathyroidectomy; Foot surgery; pr left heart cath,percutaneous; Mohs surgery (2012); Hip Arthroplasty (Right, 2017); Bunionectomy (Right); Colonoscopy ();  Eye surgery; and Cataract removal (Bilateral). Restrictions  Restrictions/Precautions  Restrictions/Precautions: Fall Risk  Position Activity Restriction  Other position/activity restrictions: Monitor O2 2L via NC, desats quickly  Subjective   General  Chart Reviewed: Yes  Additional Pertinent Hx: 81 y/o female admit 11/23/18 with NANCY, Bradycardia, Hypotension. PMH as noted including A-Fib, Cardiac Cath, Cardiomyopathy (EF 30-35%), CHF, COPD, Emphysema, Parathyroidectomy, OA R THR, Bunionectomy. Response To Previous Treatment: Patient with no complaints from previous session. Family / Caregiver Present: No  Referring Practitioner: Dr. Alexei Carranza. Subjective  Subjective: pt agreeable to getting up with therapy  Pain Screening  Patient Currently in Pain: Denies  Vital Signs  Patient Currently in Pain: Denies       Orientation  Orientation  Overall Orientation Status: Within Functional Limits  Cognition   Cognition  Overall Cognitive Status: WFL  Objective   Bed mobility  Supine to Sit: Stand by assistance  Scooting: Supervision  Transfers  Sit to Stand: Stand by assistance  Stand to sit: Stand by assistance  Ambulation  Ambulation?: Yes  Ambulation 1  Surface: level tile  Device: Rolling Walker  Assistance: Contact guard assistance;Stand by assistance (PT managed O2 line)  Quality of Gait: no LOB but becomes SOB with walking; O2 sats decreased to 79% on first walk and 83% on second walk on 4 liters  Distance: 20' and 25'  Stairs/Curb  Stairs?: No     Balance  Sitting - Static: Good  Sitting - Dynamic: Good  Standing - Static: Good  Standing - Dynamic: Fair;+  Exercises  Comments: performed B LE ex in sitting x 10 reps with some rest breaks between ex         Comment: assisted with calling for breakfast tray; assisted with positioning in chair for comfort and set up for breakfast tray; nursing in to give meds during session              Assessment   Body structures, Functions, Activity limitations: Decreased functional mobility ; Decreased

## 2018-11-27 NOTE — CONSULTS
[facility]  Palliative Care   Consult Note    NAME:  Ani Machado RECORD NUMBER:  [id number]  AGE: 80 y.o. GENDER: female  : 3/11/1930  TODAY'S DATE:  2018    Subjective     Reason for Consult:  goals of care, hospice discussion and code status   Visit Type: Initial Consult      Naif Mckeon is a 80 y.o. female referred by:  [x] Physician    PAST MEDICAL HISTORY      Diagnosis Date    [facility]rial [facility] ([facility]     C. difficile colitis     history of/  negative specimen 2018    Cardiomyopathy ([facility]     Chemotherapy     CHF (congestive heart failure) ([facility]     COPD (chronic obstructive pulmonary disease) ([facility]     Dependence on supplemental oxygen     2LNC    Hypertension     Hypothyroidism     Osteoarthritis     Rosacea        PAST SURGICAL HISTORY  Past Surgical History:   Procedure Laterality Date    BREAST SURGERY      Left breast lumpectomy    BUNIONECTOMY Right     CATARACT REMOVAL Bilateral     COLONOSCOPY      EYE SURGERY      FOOT SURGERY      toe    HIP ARTHROPLASTY Right 2017    right bipolar hip    MOHS SURGERY  2012    PARATHYROIDECTOMY      [place] LEFT HEART CATH,PERCUTANEOUS      Cardiac Cath, Left Heart       FAMILY HISTORY  Family History   Problem Relation Age of Onset    Heart Disease Mother     Cancer Father     Hearing Loss Father     Cancer Brother         bladder    Allergies Brother     Other Brother         GI issues, unknown    Cancer Brother         skin    Parkinsonism Brother     Asthma Brother        SOCIAL HISTORY  Social History   Substance Use Topics    Smoking status: Former Smoker     Types: Cigarettes     Quit date: 1980    Smokeless tobacco: Never Used    Alcohol use No       ALLERGIES  Allergies   Allergen Reactions    Adhesive Tape        MEDICATIONS  No current facility-administered medications on file prior to encounter.       Current Outpatient Prescriptions on File Prior to Encounter Medication Sig Dispense Refill    metoprolol succinate (TOPROL XL) 25 MG extended release tablet Take 1 tablet by mouth daily 30 tablet 3    torsemide (DEMADEX) 20 MG tablet Take 20 mg daily. Take additional 20 mg dose on [address] tablet 3    diclofenac sodium 1 % GEL Apply 2 g topically 4 times daily as needed for Pain (Back and left hifp)       tiotropium (SPIRIVA RESPIMAT) 1.25 MCG/ACT AERS inhaler Inhale 2 puffs into the lungs daily 1 Inhaler 3    albuterol sulfate HFA (PROAIR HFA) 108 (90 Base) MCG/ACT inhaler Inhale 2 puffs into the lungs every 4 hours as needed for Wheezing or Shortness of Breath 1 Inhaler 3    docusate sodium (COLACE) 100 MG capsule Take 100 mg by mouth daily      Probiotic Product (PROBIOTIC DAILY PO) Take 1 tablet by mouth every hour as needed       Misc Natural Products (OSTEO BI-FLEX JOINT SHIELD) TABS Take 1 tablet by mouth daily      BOSWELLIA MESFIN PO Take 1 tablet by mouth daily       COLLAGEN PO Take 10 mg by mouth daily      levothyroxine (SYNTHROID) 100 MCG tablet Take 1 tablet by mouth Daily 30 tablet 0    guaiFENesin (MUCINEX) 600 MG extended release tablet Take 600 mg by mouth 2 times daily as needed for Congestion       fluticasone-salmeterol (ADVAIR HFA) 115-21 MCG/ACT inhaler Inhale 2 puffs into the lungs 2 times daily      Calcium Carb-Cholecalciferol (CALCIUM-VITAMIN D) 600-400 MG-UNIT TABS Take 1 tablet by mouth daily      metroNIDAZOLE (METROGEL) 0.75 % gel Apply topically 2 times daily Apply to face 2 times daily.  vitamin D (CHOLECALCIFEROL) 1000 units TABS tablet Take 2,000 Units by mouth daily       HYDROcodone-acetaminophen (NORCO) 5-325 MG per tablet Take 1 tablet by mouth every 8 hours as needed for Pain.  Tamijennifer Anglin OXYGEN 2 L/min continuous       Multiple Vitamins-Minerals (MULTI FOR HER 50+ PO) Take 1 tablet by mouth daily          Objective         BP (!) 153/77   Pulse 92   Temp 95.9 °F (35.5 °C) (Axillary)   Resp 16

## 2018-11-29 PROBLEM — I50.22 CHRONIC SYSTOLIC CHF (CONGESTIVE HEART FAILURE) (HCC): Status: ACTIVE | Noted: 2018-01-01

## 2018-11-29 NOTE — PROGRESS NOTES
Occupational Therapy  Facility/Department: [facility] 5W PROGRESSIVE CARE  Daily Treatment Note  Let this serve as discharge note if patient is discharged prior to next OT session  NAME: Panfilo Hernandez  : 3/11/1930  MRN: [id number]    Date of Service: 2018    Discharge Recommendations:  24 hour supervision or assist, S Level 1 (notes indicate patient has declined hospice services)    HOME HEALTH CARE: LEVEL 1 STANDARD   -Initial home health evaluation to occur within 24-48 hours, in patient home    -Home health agency to establish plan of care for patient over 60 day period    -Medication Reconciliation    -PCP Visit scheduled within seven days of discharge    -PT/OT to evaluate with goal of regaining prior level of functioning    -OT to evaluate if patient has [address] needs for personal care     Patient Diagnosis(es): The primary encounter diagnosis was NANCY (acute kidney injury) ([facility] Diagnoses of Bradycardia and Acute pulmonary edema (HCC) were also pertinent to this visit. has a past medical history of Atrial fibrillation ([address] C. difficile colitis; Cardiomyopathy ([address] Chemotherapy; CHF (congestive heart failure) (HCC); COPD (chronic obstructive pulmonary disease) ([address] Dependence on supplemental oxygen; Hypertension; Hypothyroidism; Osteoarthritis; and Rosacea. has a past surgical history that includes Breast surgery; parathyroidectomy; Foot surgery; pr left heart cath,percutaneous; Mohs surgery (2012); Hip Arthroplasty (Right, 2017); Bunionectomy (Right); Colonoscopy (); Eye surgery; and Cataract removal (Bilateral). Restrictions  Restrictions/Precautions: Fall Risk  Other position/activity restrictions: Monitor O2 3L via NC, desats quickly    Subjective   Chart Reviewed: Yes  Patient assessed for rehabilitation services?: Yes  Additional Pertinent Hx:  Hx per S Errol Temple pt:\" 81 y/o female admit 18 with NANCY, Bradycardia, Hypotension.   PMH as noted including A-Fib,

## 2018-11-29 NOTE — PROGRESS NOTES
PRN  magnesium hydroxide (MILK OF MAGNESIA) 400 MG/5ML suspension 30 mL, 30 mL, Oral, Daily PRN  ondansetron (ZOFRAN) injection 4 mg, 4 mg, Intravenous, Q6H PRN  oyster shell calcium/vitamin D 250-125 MG-UNIT per tablet 250 mg, 1 tablet, Oral, Daily  docusate sodium (COLACE) capsule 100 mg, 100 mg, Oral, Daily  pantoprazole (PROTONIX) tablet 40 mg, 40 mg, Oral, Dinner  mometasone-formoterol (DULERA) 200-5 MCG/ACT inhaler 2 puff, 2 puff, Inhalation, BID  levothyroxine (SYNTHROID) tablet 100 mcg, 100 mcg, Oral, Daily  tiotropium (SPIRIVA) inhalation capsule 18 mcg, 1 capsule, Inhalation, Daily  HYDROcodone-acetaminophen (NORCO) 5-325 MG per tablet 1 tablet, 1 tablet, Oral, Q8H PRN  guaiFENesin (MUCINEX) extended release tablet 600 mg, 600 mg, Oral, BID  heparin (porcine) injection 5,000 Units, 5,000 Units, Subcutaneous, BID  ipratropium-albuterol (DUONEB) nebulizer solution 1 ampule, 1 ampule, Inhalation, Q4H WA  bacitracin-polymyxin b (POLYSPORIN) ointment, , Topical, Daily  traMADol (ULTRAM) tablet 50 mg, 50 mg, Oral, Q6H PRN  acetaminophen (TYLENOL) tablet 325 mg, 325 mg, Oral, Q4H PRN         Labs:   Recent Labs      11/28/18   0529  11/29/18   0707   WBC  4.2  5.3   HGB  9.6*  10.6*   HCT  29.2*  33.0*   PLT  109*  118*     Recent Labs      11/28/18   0529  11/29/18   1121   NA  139  140   K  5.1  4.6   CO2  30  27   BUN  44*  39*   CREATININE  1.4*  1.3*   LABGLOM  35*  39*       Telemetry Monitor:       EKG:    Atrial fibrillation with slow ventricular response     Chest X-Ray:  Moderate congestive failure with pleural effusion. ECHO: 11/2018  Patient may be in atrial fibrillation. Overall left ventricular systolic function appears moderately reduced with  an ejection fraction around 40%. Abnormal (paradoxical) septal motion is  present. Cannot exclude other wall motion abnormalities secondary to poor  endocardial definition.  Normal left ventricular wall thickness and cavity  size.   Severely dilated left

## 2018-11-29 NOTE — PROGRESS NOTES
D: pt has distended abdomen; pt has positive bowel sounds, but not passing gas; pt has been given milk of mag, stool softeners, prune juice, has not had bm in at least three days A: this RN sent secure message to Dr. Chris Sousa R: waiting for physician's response

## 2018-11-29 NOTE — PROGRESS NOTES
Nutrition Assessment    Type and Reason for Visit: Reassess    Nutrition Recommendations:   Cardiac/1500 ml diet   Ensure Compact daily   Will monitor nutritional adequacy, nutrition-related labs, weights, BMs, and clinical progress     Nutrition Assessment: Pt still meets criteria for malnutrition, however meal intake is improving and pt is accepting supplement. Further nutrition compromise still possible d/t increased nutrient needs and clinical status (cardiac dysfunction). Will continue current diet and supplements. Pt lives with daughter her manages diet closely. Malnutrition Assessment:  · Malnutrition Status: Meets the criteria for moderate malnutrition  · Context: Chronic illness  · Findings of the 6 clinical characteristics of malnutrition (Minimum of 2 out of 6 clinical characteristics is required to make the diagnosis of moderate or severe Protein Calorie Malnutrition based on AND/ASPEN Guidelines):  1. Energy Intake- (greater than 75% per pt),      2. Weight Loss-10% loss or greater (pt reported r/t fluid shifts as well as hospitalization), in 6 months  3. Fat Loss-Moderate subcutaneous fat loss, Orbital  4. Muscle Loss-Moderate muscle mass loss, Temples (temporalis muscle)  5. Fluid Accumulation-No significant fluid accumulation, Extremities  6.  Strength-Not measured    Nutrition Risk Level: Moderate    Nutrient Needs:  · Estimated Daily Total Kcal: 9851-1021  · Estimated Daily Protein (g): 64-74 (1.2-1.4)  · Estimated Daily Total Fluid (ml/day): 1500 ml per MD    Nutrition Diagnosis:   · Problem:  Moderate malnutrition  · Etiology: related to Insufficient energy/nutrient consumption     Signs and symptoms:  as evidenced by Moderate loss of subcutaneous fat, Moderate muscle loss    Objective Information:  · Nutrition-Focused Physical Findings: -2.9 L   · Wound Type:  (graft site & surgical site for recent skin cancer sx left forehead)  · Current Nutrition Therapies:  · Oral Diet Orders:

## 2018-11-30 NOTE — PROGRESS NOTES
Spoke w Radiologist on call Dr Toro Brown : pt has severe constipation without clear evidence of obstruction   Will initiate golytely 8 onz every 15 min x 2 L along with mechanical disimpaction to be performed by LILIANA Hughes MD   11/29/2018 10:23 PM

## 2018-11-30 NOTE — PROGRESS NOTES
Hospitalist Progress Note      PCP: Joanna Daniels MD    Date of Admission: 11/23/2018        Subjective: improved SOB, no chest pain, had a small BM, still having distended abdomen. No fever or chills. Medications:  Reviewed    Infusion Medications   Scheduled Medications    sulfamethoxazole-trimethoprim  0.5 tablet Oral 2 times per day    furosemide  20 mg Intravenous BID    cetirizine  5 mg Oral Daily    midodrine  5 mg Oral TID WC    sodium chloride flush  10 mL Intravenous 2 times per day    oyster shell calcium/vitamin D  1 tablet Oral Daily    docusate sodium  100 mg Oral Daily    pantoprazole  40 mg Oral Dinner    mometasone-formoterol  2 puff Inhalation BID    levothyroxine  100 mcg Oral Daily    tiotropium  1 capsule Inhalation Daily    guaiFENesin  600 mg Oral BID    heparin (porcine)  5,000 Units Subcutaneous BID    ipratropium-albuterol  1 ampule Inhalation Q4H WA    bacitracin-polymyxin b   Topical Daily     PRN Meds: sodium chloride flush, magnesium hydroxide, ondansetron, HYDROcodone 5 mg - acetaminophen, traMADol, acetaminophen      Intake/Output Summary (Last 24 hours) at 11/30/18 1049  Last data filed at 11/30/18 0600   Gross per 24 hour   Intake              240 ml   Output              400 ml   Net             -160 ml       Physical Exam Performed:    /76   Pulse 112 Comment: Irregular  Temp 97.7 °F (36.5 °C) (Oral)   Resp 18   Ht 4' 11\" (1.499 m)   Wt 117 lb 15.1 oz (53.5 kg)   SpO2 (!) 87%   BMI 23.82 kg/m²     General appearance: No apparent distress  Neck: Supple  Respiratory:  Normal respiratory effort. Clear to auscultation, bilaterally without Rales/Wheezes/Rhonchi. Cardiovascular: Regular rate and rhythm with normal S1/S2 without murmurs, rubs or gallops. Abdomen: Soft, mildly distended   Musculoskeletal: No clubbing  Skin: Skin color, texture, turgor normal.  No rashes or lesions.   Neurologic:  Moving all extremities   Psychiatric: Alert and

## 2018-11-30 NOTE — PROGRESS NOTES
RN spoke w/ Dr. Mamie Logan about IR wanting to speak to MD about patients CT results. Dr. Mamie Logan made aware.

## 2018-11-30 NOTE — CONSULTS
GASTROENTEROLOGY INPATIENT CONSULTATION      IDENTIFYING DATA/REASON FOR CONSULTATION   PATIENT:  Evita Galvan  MRN:  [id number]  ADMIT DATE: 11/23/2018  TIME OF EVALUATION: 11/30/2018 10:50 AM  HOSPITAL STAY:   LOS: 7 days     REASON FOR CONSULTATION:  Constipation     HISTORY OF PRESENT ILLNESS   Evita Galvan is a 80 y.o. female with a PMH of Hypothyroidism, hypertension, COPD, CHF, Hx of C Diff colitis, Atrial fib who presented on 11/23/2018 with Bradycardia [R00.1]. We have been consulted regarding . Information was obtained from the patient, examination of the patient and review of records.        Chart review shows she has had multiple MBS at  showing dysphagia/silent aspiration   Prior Endoscopic Evaluations: None found EMR    PAST MEDICAL, SURGICAL, FAMILY, and SOCIAL HISTORY     Past Medical History:   Diagnosis Date    Atrial fibrillation ([address]     C. difficile colitis     history of/  negative specimen 4/12/2018    [facility]     Chemotherapy     CHF (congestive heart failure) ([address]     COPD (chronic obstructive pulmonary disease) (HCC)     Dependence on supplemental oxygen     2LNC    Hypertension     Hypothyroidism     Osteoarthritis     Rosacea      Past Surgical History:   Procedure Laterality Date    BREAST SURGERY      Left breast lumpectomy    BUNIONECTOMY Right     CATARACT REMOVAL Bilateral     COLONOSCOPY  2011    EYE SURGERY      FOOT SURGERY      toe    HIP ARTHROPLASTY Right 09/12/2017    right bipolar hip    MOHS SURGERY  7/2012    PARATHYROIDECTOMY      [place] LEFT HEART CATH,PERCUTANEOUS      Cardiac Cath, Left Heart     Family History   Problem Relation Age of Onset    Heart Disease Mother     Cancer Father     Hearing Loss Father     Cancer Brother         bladder    Allergies Brother     Other Brother         GI issues, unknown    Cancer Brother         skin    Parkinsonism Brother     Asthma Brother      Social History     Social History   

## 2018-11-30 NOTE — PROGRESS NOTES
Yes  Patient assessed for rehabilitation services?: Yes  Additional Pertinent Hx:  Hx per S Travis Pain pt:\" 79 y/o female admit 11/23/18 with NANCY, Bradycardia, Hypotension. PMH as noted including A-Fib, Cardiac Cath, Cardiomyopathy (EF 30-35%), CHF, COPD, Emphysema, Parathyroidectomy, OA R THR, Bunionectomy. \" Pt D/C from [facility]; 11/19 w/  Home care, RN sent pt to [facility] d/t weakness and hypotension. Response to previous treatment: Patient with no complaints from previous session  Family / Caregiver Present: No  Referring Practitioner: SILVERIO AUSTIN  Diagnosis: NANCY, Bradycardia, Hypotension. D/C from hospital 11/19  Subjective  Subjective: Patient presents in bed and agreeable to OT txt. Pain Assessment  Patient Currently in Pain: Denies  Vital Signs  Patient Currently in Pain: Denies   Orientation  Orientation  Overall Orientation Status: Within Functional Limits  Objective    ADL  Toileting: Minimal assistance (pt with BM on std toilet ; pt able to complete however therapist assist with perineal care for thoroughness ; able to pull personal gown up and down )        Balance  Sitting Balance: Modified independent   Standing Balance: Stand by assistance (RW)  Standing Balance  Sit to stand: Stand by assistance  Stand to sit: Stand by assistance  Comment: EOB > std toilet > EOB   Functional Mobility  Functional - Mobility Device: Rolling Walker  Activity: To/from bathroom  Assist Level: Stand by assistance  Functional Mobility Comments: Patient completed fxl mobility from EOB > BR > EOB of 10 ft x 2 with SBA. Therapist managed O2 line.    Toilet Transfers  Toilet - Technique: Ambulating  Equipment Used: Standard toilet  Toilet Transfer: Stand by assistance  Toilet Transfers Comments: RW  Bed mobility  Supine to Sit: Stand by assistance (HOB elevated)  Sit to Supine: Minimal assistance (for BLE )  Transfers  Sit to stand: Stand by assistance  Stand to sit: Stand by assistance      Cognition  Overall Cognitive Status: Co-treatment   Time In 1502         Time Out 1527         Minutes 25         Timed Code Treatment Minutes: 25 Minutes     If pt is discharged prior to next OT session, this note will serve as the discharge summary.   Chance Xavier OTR/L #[id number]

## 2018-11-30 NOTE — PROGRESS NOTES
[facility]  [facility]    CC:         fatigue          HPI:   This is a 80 y.o. female     This is an 66-year-old female with a history of cardiomyopathy, chronic combined systolic and diastolic heart failure, COPD on home oxygen, presented to the hospital for fatigue. She was found to have   low blood pressure and  low heart rate.  Her heart rate initially was in 20s and 30s.   She was given IV fluids with improvement in her symptoms. Interval history  Continues  to respond to IV Lasix  Fluid balance: -4195  Breathing is better. Complains of constipation, now        Intake/Output Summary (Last 24 hours) at 11/30/18 1205  Last data filed at 11/30/18 1117   Gross per 24 hour   Intake              240 ml   Output             1450 ml   Net            -1210 ml         Physical Examination:    /77   Pulse 99   Temp 97.7 °F (36.5 °C) (Oral)   Resp 18   Ht 4' 11\" (1.499 m)   Wt 117 lb 15.1 oz (53.5 kg)   SpO2 95%   BMI 23.82 kg/m²   HEENT:  Face: Atraumatic, Conjunctiva: Pink; non icteric,  Mucous Memb:  Moist, No thyromegaly or Lymphadenopathy  Respiratory:  Resp Assessment: tachypnic, Resp Auscultation: basilar rales   Cardiovascular: Auscultation: nl S1 & S2, Palpation:  Nl PMI;  No heaves or thrills, JVP:  normal  Abdomen: Soft, non-tender, Normal bowel sounds,  No organomegaly  Extremities: No Cyanosis or Clubbing; Edema none  Neurological: Oriented to time, place, and person, Non-anxious  Psychiatric: Normal mood and affect  Skin: Warm and dry,  No rash seen    Current Facility-Administered Medications: sulfamethoxazole-trimethoprim (BACTRIM DS;SEPTRA DS) 800-160 MG per tablet 0.5 tablet, 0.5 tablet, Oral, 2 times per day  furosemide (LASIX) injection 20 mg, 20 mg, Intravenous, BID  cetirizine (ZYRTEC) tablet 5 mg, 5 mg, Oral, Daily  midodrine (PROAMATINE) tablet 5 mg, 5 mg, Oral, TID WC  sodium chloride flush 0.9 % injection 10 mL, 10 mL, Intravenous, 2 times per ventricular wall thickness and cavity  size.   Severely dilated left and right atria.   Mild mitral regurgitation.   Moderate tricuspid regurgitation.  IVC size is dilated (>2.1 cm) and collapses < 50% with respiration consistent with elevated RA pressure (15 mmHg).  Estimated pulmonary artery systolic pressure is severely elevated at 55 mmHg  assuming a right atrial pressure of 15 mmHg.   There is a large pleural effusion. ASSESSMENT AND PLAN:      Admitted with fatigue and found to have bradycardia and low blood pressure  Was on torsemide 20 mg daily  This may have caused hypotension  I don't know why she was bradycardic; she has atrial fibrillation and is not on any medicines to slow her rate  These have now improved  But now she is short of breath. I believe this is from combined systolic and diastolic heart failure      Continues  to respond to IV Lasix  Fluid balance: -4195  Breathing is better  Renal function is holding  May transition to torsemide 20 mg daily in the evenings and 20 mg twice a day Monday, Wednesday, Friday        CRISSY MANCIA  11/30/2018

## 2018-12-01 NOTE — PROGRESS NOTES
elevated RA pressure (15 mmHg).  Estimated pulmonary artery systolic pressure is severely elevated at 55 mmHg  assuming a right atrial pressure of 15 mmHg.   There is a large pleural effusion. Objective:   Vitals: BP (!) 144/69   Pulse 73   Temp 97.8 °F (36.6 °C)   Resp 14   Ht 4' 11\" (1.499 m)   Wt 117 lb 4.6 oz (53.2 kg)   SpO2 92%   BMI 23.69 kg/m²   General appearance: alert, appears stated age and cooperative, No acute distress   Skin: Skin color, texture, turgor normal. No rashes or ecchymosis. HEENT: Head: Normocephalic, no lesions, without obvious abnormality.   Neck: no adenopathy, no carotid bruit, no JVD, supple, symmetrical, trachea midline and thyroid not enlarged, symmetric, no tenderness/mass/nodules  Lungs: clear to auscultation bilaterally, no accessory muscle use, no respiratory distress, on RA  Heart: irregularly irregular rhythm  Abdomen: soft, non-tender; bowel sounds normal; no masses,  no organomegaly  Extremities: extremities normal, atraumatic, no cyanosis or edema, pulses: DP +2/+2, PT +2/+2  Neurologic: Mental status: Alert, oriented, thought content appropriate, no tremors, no gross sensory motor deficit,   Psychiatric: normal insight and affect      Assessment & Plan:    Patient Active Problem List:     Essential hypertension, benign     Cardiomyopathy ([address]     SOB (shortness of breath)     Pulmonary emphysema (HCC)     Breast cancer ([address]     Cystocele     Rectocele     9/12/17 RIGHT hip hemiarthroplasty     HTN (hypertension)     HLD (hyperlipidemia)     Chronic diastolic CHF (congestive heart failure) (HCC)     GERD (gastroesophageal reflux disease)     Bilateral pulmonary embolism (HCC)     Acute on chronic respiratory failure with hypoxia (HCC)     Chronic obstructive pulmonary disease (HCC)     Centrilobular emphysema (HCC)     Dysphagia     Hypothyroid     Staphylococcal pneumonia (HCC)     NANCY (acute kidney injury) ([address]     Chronic systolic CHF (congestive heart

## 2018-12-01 NOTE — PLAN OF CARE
Problem: Falls - Risk of:  Goal: Will remain free from falls  Will remain free from falls   Outcome: Ongoing  A: Fall precaution education, non-skid socks, bed/ chair alarm, locked bed/ bedside recliner wheels, call light and bedside table in reach, intentional rounding, and [employer] dome light. Problem: OXYGENATION/RESPIRATORY FUNCTION  Goal: Patient will achieve/maintain normal respiratory rate/effort  Respiratory rate and effort will be within normal limits for the patient   Outcome: Ongoing  A: Respiratory assessment, SPO2 monitoring, titration of O2 support to keep O2 sat > 90%, keeping HOB at least 30 degrees elevation, planning activities to conserve energy.     Problem: Pain:  Goal: Control of chronic pain  Control of chronic pain   Outcome: Ongoing  A: Pain scale utilization, PRN pain meds administration, pain re-assessment 1 hour post-administration, and informing the MD of pain above the pt.'s acceptable goal.    Comments: Electronically signed by Fiorella Gabriel RN on 11/24/2018 at 12:14 AM
Problem: Falls - Risk of:  Goal: Will remain free from falls  Will remain free from falls   Outcome: Ongoing  Fall precautions in place. Bed wheels locked, bed in lowest position, call light within reach, non-skid footwear applied, bed alarm on.     Problem: OXYGENATION/RESPIRATORY FUNCTION  Goal: Patient will maintain patent airway  Outcome: Ongoing      Problem: HEMODYNAMIC STATUS  Goal: Patient has stable vital signs and fluid balance  Outcome: Ongoing      Problem: FLUID AND ELECTROLYTE IMBALANCE  Goal: Fluid and electrolyte balance are achieved/maintained  Outcome: Ongoing
Problem: Falls - Risk of:  Goal: Will remain free from falls  Will remain free from falls   Outcome: Ongoing  Fall risk assessment completed every shift. All precautions in place. Pt has call light within reach at all times. Room clear of clutter. Pt aware to call for assistance when getting up. Problem: OXYGENATION/RESPIRATORY FUNCTION  Goal: Patient will maintain patent airway  Outcome: Ongoing  Lungs assessed each shift. Continuous pulse oximetry and cardiac telemetry in place throughout the shift. I/Os strictly monitored. Daily weights monitored. Problem: Pain:  Goal: Control of acute pain  Control of acute pain   Outcome: Ongoing  Pain/discomfort being managed with PRN analgesics per MD orders. Pt able to express presence and absence of pain and rate pain appropriately using numerical scale.
Problem: Falls - Risk of:  Goal: Will remain free from falls  Will remain free from falls   Outcome: Ongoing  Fall risk assessment completed per unit protocol. Patient's bed is in the lowest position, call light is within reach and the patient's room is free of clutter. The patient has been instructed to call for assistance before getting out of bed or the chair. Problem: OXYGENATION/RESPIRATORY FUNCTION  Goal: Patient will maintain patent airway  Outcome: Ongoing  Patient is able to breathe comfortably on 4 L of oxygen per nasal cannula. Problem: HEMODYNAMIC STATUS  Goal: Patient has stable vital signs and fluid balance  Outcome: Ongoing  Patient's vital signs are stable and WNL    Problem: ACTIVITY INTOLERANCE/IMPAIRED MOBILITY  Goal: Mobility/activity is maintained at optimum level for patient  Outcome: Ongoing  Patient is able to ambulate with the assistance of one nurse or aid and a rolling walker. Problem: Pain:  Goal: Pain level will decrease  Pain level will decrease   Outcome: Ongoing  Pain/discomfort being managed with PRN analgesics per MD orders. Pt able to express presence and absence of pain and rate pain appropriately using numerical scale.
Problem: Nutrition  Goal: Optimal nutrition therapy  Outcome: Ongoing  Nutrition Problem:  Moderate malnutrition  Intervention: Food and/or Nutrient Delivery: Continue current diet, Start ONS  Nutritional Goals: consume >/= to 50%
Problem: OXYGENATION/RESPIRATORY FUNCTION  Goal: Patient will maintain patent airway  Outcome: Ongoing  Weaning oxygen as tolerated     Problem: HEMODYNAMIC STATUS  Goal: Patient has stable vital signs and fluid balance  Outcome: Ongoing  Heart rate and rhythm continuously monitored. Vitals taken every four hours. BLE trace edema. Palpable pulses. Daily weights. Problem: FLUID AND ELECTROLYTE IMBALANCE  Goal: Fluid and electrolyte balance are achieved/maintained  Outcome: Ongoing  Fluid restriction, vicente     Problem: ACTIVITY INTOLERANCE/IMPAIRED MOBILITY  Goal: Mobility/activity is maintained at optimum level for patient  Outcome: Not Met This Shift  Up with assistance     Problem: Pain:  Goal: Pain level will decrease  Pain level will decrease   Outcome: Ongoing  Managed with PO medications     Problem: Urinary Elimination:  Goal: Signs and symptoms of infection will decrease  Signs and symptoms of infection will decrease   Outcome: Ongoing  Vicente     Problem: Cardiac Output - Decreased:  Goal: Hemodynamic stability will improve  Hemodynamic stability will improve   Outcome: Ongoing  Heart rate and rhythm continuously monitored. Vitals taken every four hours. Trace BLE edema. Palpable pulses. Daily weights. Problem: Risk for Impaired Skin Integrity  Goal: Tissue integrity - skin and mucous membranes  Structural intactness and normal physiological function of skin and  mucous membranes. Outcome: Ongoing  Skin assessment completed every shift per protocol. Pt assessed for incontinence, appropriate barrier cream applied as needed. Pt encouraged to turn/rotate every 2 hours. Assistance provided if pt unable to do so themselves. Will continue to monitor.
accessory muscle use. Problem: Urinary Elimination:  Goal: Complications related to the disease process, condition or treatment will be avoided or minimized  Complications related to the disease process, condition or treatment will be avoided or minimized   Outcome: Ongoing  A: Assess need for continued use. Assess and educate for signs/symptoms of infection. Monitor quality and quantity of urine output. Stat lock in place, drainage bag hanging below level of bladder, routine catheter care. Problem: Cardiac Output - Decreased:  Goal: Hemodynamic stability will improve  Hemodynamic stability will improve   Outcome: Ongoing  A: Continuous cardiac monitor.  VSS per routine
fluid balance  Outcome: Ongoing    Intervention: ASSESS RATE AND RHYTHM OF APICAL PULSE  A: VS per routine. Continuous cardiac monitoring. Intervention: ASSESS PERIPHERAL PULSES AND CAPILLARY REFILL  A: Assess peripheral pulses, color, skin temp, sensation, pulses, and cap refill    Intervention: ASSESS PERIPHERAL, SACRAL, PERIORBITAL AND ABDOMINAL EDEMA  A: Assess for edema      Problem: FLUID AND ELECTROLYTE IMBALANCE  Goal: Fluid and electrolyte balance are achieved/maintained  Outcome: Ongoing    Intervention: INSTRUCT PATIENT TO DRINK PRESCRIBED FLUID AMOUNT  A: Strict I/O, daily weight, follow ordered fluid restriction. Educate patient and family. Intervention: REPORT ABNORMAL LAB VALUES TO PHYSICIAN  A: Review lab results and notify physician of abnormal labs  Intervention: ASSESS/MONITOR RESPIRATORY STATUS  A: Assess respiratory rate, effort and pattern. Assess for HIRSCH, SOB and accessory muscle use. Problem: ACTIVITY INTOLERANCE/IMPAIRED MOBILITY  Goal: Mobility/activity is maintained at optimum level for patient  Outcome: Ongoing  A: PT/OT eval and treat. Assess pt tolerance of progressive activity plan. Problem: Pain:  Goal: Pain level will decrease  Pain level will decrease   Outcome: Ongoing  A: Assess for pain. Reposition and/or medicate as needed for pain. Reassess for effectiveness of intervention. Notify physician of unmanaged pain. Problem: Urinary Elimination:  Goal: Complications related to the disease process, condition or treatment will be avoided or minimized  Complications related to the disease process, condition or treatment will be avoided or minimized   Outcome: Ongoing  A: Assess need for continued use. Assess and educate for signs/symptoms of infection. Monitor quality and quantity of urine output. Stat lock in place, drainage bag hanging below level of bladder, routine catheter care.       Problem: Risk for Impaired Skin Integrity  Goal: Tissue integrity - skin
pattern. Assess for HIRSCH, SOB and accessory muscle use.           Problem: Urinary Elimination:  Goal: Complications related to the disease process, condition or treatment will be avoided or minimized  Complications related to the disease process, condition or treatment will be avoided or minimized   Outcome: Ongoing  A: Assess need for continued use. Assess and educate for signs/symptoms of infection. Monitor quality and quantity of urine output. Stat lock in place, drainage bag hanging below level of bladder, routine catheter care.        Problem: Cardiac Output - Decreased:  Goal: Hemodynamic stability will improve  Hemodynamic stability will improve   Outcome: Ongoing  A: Continuous cardiac monitor.  VSS per routine   
using numerical scale. Problem: Urinary Elimination:  Goal: Signs and symptoms of infection will decrease  Signs and symptoms of infection will decrease   Outcome: Ongoing  Pt is free of signs and symptoms of infection. Incision and dressing are clean, dry and intact. Vital signs stable. Will monitor.      Goal: Complications related to the disease process, condition or treatment will be avoided or minimized  Complications related to the disease process, condition or treatment will be avoided or minimized   Outcome: Ongoing      Problem: Cardiac Output - Decreased:  Goal: Hemodynamic stability will improve  Hemodynamic stability will improve   Outcome: Ongoing

## 2018-12-01 NOTE — CARE COORDINATION
Sw aware of patient's dc and need to resume her [employer] [employer]. Sw contacted home care agency and faxed patient's orders to the agency. They will follow up with patient.      Electronically signed by Tami Brwon on 12/1/2018 at 12:13 PM

## 2018-12-02 NOTE — CARE COORDINATION
[facility] Transitions Initial Follow Up Call    Call within 2 business days of discharge: Yes    Patient: Faizan Cai Patient : 3/11/1930   MRN: <[id number]>  Reason for Admission: There are no discharge diagnoses documented for the most recent discharge. Discharge Date: 18 RARS: Readmission Risk Score: 36     Spoke with:   Patient's daughter    Facility:   [address] [place] [address] 24 Hour Call    Do you have all of your prescriptions and are they filled?:  Yes  Patient DME:  Straight cane, [place] Aver, Wheelchair, Shower chair  Patient Home Equipment:  Oxygen  Do you have support at home?:  Child  Are you an active caregiver in your home?:  No  Care Transitions Interventions         Follow Up;  Spoke with patient's daughter. Reports that patient is feeling well, slept good overnight and is eating breakfast.  Patient's daughter reports that she assists with patient care needs. Reviewed discharge instructions and medications. Patient 's daughter reports that patient is taking her medications as directed. Denies any questions in regard to patient prescriptions. Patient's daughter reports that patient was instructed to follow up in her PCP 's office tomorrow to have blood drawn. Reports that she has an appointment with Oncology [facility] and with Cardiology Wed. Patient has been receiving services from [facility]. Patient's daughter verbalized her plan to contact [facility] today. Denies any questions, equipment or resource needs. Agreeable to continued Care Transition.   Future Appointments  Date Time Provider Kevin Haji   2018 12:20 PM Sung 71, APRN - CNP [facility]   2019 1:00 PM Kya Nixon MD GEOVANI Hull RUFUS Lewis RN

## 2018-12-06 NOTE — PROGRESS NOTES
abdominal pain, change in bowel habits, melena or hematochezia. Poor appetite improving  : Denies hematuria, incontinence, urgency  Skin: + skin lesion removed from head with recent skin graft  Musculoskeletal: + chronic L hip pain  Neurological: Denies syncope or TIA-like symptoms.   Psychiatric: Denies anxiety, insomnia or depression     Past Medical History:   Diagnosis Date    Atrial fibrillation ([facility] [facility]     C. difficile colitis     history of/  negative specimen 4/12/2018    Cardiomyopathy ([facility] [facility]     Chemotherapy     CHF (congestive heart failure) (HCC)     COPD (chronic obstructive pulmonary disease) (HCC)     Dependence on supplemental oxygen     2LNC    Hypertension     Hypothyroidism     Osteoarthritis     Rosacea      Past Surgical History:   Procedure Laterality Date    BREAST SURGERY      Left breast lumpectomy    BUNIONECTOMY Right     CATARACT REMOVAL Bilateral     COLONOSCOPY  2011    EYE SURGERY      FOOT SURGERY      toe    HIP ARTHROPLASTY Right 09/12/2017    right bipolar hip    MOHS SURGERY  7/2012    PARATHYROIDECTOMY      AL LEFT HEART CATH,PERCUTANEOUS      Cardiac Cath, Left Heart     Family History   Problem Relation Age of Onset    Heart Disease Mother     Cancer Father     Hearing Loss Father     Cancer Brother         bladder    Allergies Brother     Other Brother         GI issues, unknown    Cancer Brother         skin    Parkinsonism Brother     Asthma Brother      Social History   Substance Use Topics    Smoking status: Former Smoker     Types: Cigarettes     Quit date: 12/13/1980    Smokeless tobacco: Never Used    Alcohol use No       Allergies   Allergen Reactions    Adhesive Tape      Current Outpatient Prescriptions   Medication Sig Dispense Refill    midodrine (PROAMATINE) 5 MG tablet Take 1 tablet by mouth 3 times daily (with meals) 90 tablet 0    methylcellulose (CITRUCEL) 500 MG TABS Take 2 tablets by mouth daily      esomeprazole (NEXIUM) 20 MG delayed release capsule Take 20 mg by mouth Daily with supper      BACITRACIN-POLYMYXIN B, OPHTH, OINT Apply 1 each to eye daily Apply to skin graft and donor sites      torsemide (DEMADEX) 20 MG tablet Take 20 mg daily. Take additional 20 mg dose on Drkkav-Ompq-Ofcuaj 50 tablet 3    diclofenac sodium 1 % GEL Apply 2 g topically 4 times daily as needed for Pain (Back and left hifp)       tiotropium (SPIRIVA RESPIMAT) 1.25 MCG/ACT AERS inhaler Inhale 2 puffs into the lungs daily 1 Inhaler 3    albuterol sulfate HFA (PROAIR HFA) 108 (90 Base) MCG/ACT inhaler Inhale 2 puffs into the lungs every 4 hours as needed for Wheezing or Shortness of Breath 1 Inhaler 3    docusate sodium (COLACE) 100 MG capsule Take 100 mg by mouth daily      Probiotic Product (PROBIOTIC DAILY PO) Take 1 tablet by mouth every hour as needed       Misc Natural Products (OSTEO BI-FLEX JOINT SHIELD) TABS Take 1 tablet by mouth daily      BOSWELLIA MESFIN PO Take 1 tablet by mouth daily       COLLAGEN PO Take 10 mg by mouth daily      levothyroxine (SYNTHROID) 100 MCG tablet Take 1 tablet by mouth Daily 30 tablet 0    guaiFENesin (MUCINEX) 600 MG extended release tablet Take 600 mg by mouth 2 times daily as needed for Congestion       fluticasone-salmeterol (ADVAIR HFA) 115-21 MCG/ACT inhaler Inhale 2 puffs into the lungs 2 times daily      Calcium Carb-Cholecalciferol (CALCIUM-VITAMIN D) 600-400 MG-UNIT TABS Take 1 tablet by mouth daily      metroNIDAZOLE (METROGEL) 0.75 % gel Apply topically 2 times daily Apply to face 2 times daily.  vitamin D (CHOLECALCIFEROL) 1000 units TABS tablet Take 2,000 Units by mouth daily       HYDROcodone-acetaminophen (NORCO) 5-325 MG per tablet Take 1 tablet by mouth every 8 hours as needed for Pain. Lynndvenice Anderson OXYGEN 2 L/min continuous       Multiple Vitamins-Minerals (MULTI FOR HER 50+ PO) Take 1 tablet by mouth daily        No current facility-administered medications for this visit. Physical Exam:   /64   Pulse 59   Ht 4' 11\" (1.499 m)   Wt 109 lb (49.4 kg)   BMI 22.02 kg/m²   Wt Readings from Last 2 Encounters:   12/06/18 109 lb (49.4 kg)   12/01/18 117 lb 4.6 oz (53.2 kg)     Constitutional: She is oriented to person, place, and time. She appears frail and slight in appearance. In no acute distress. +Ambler. O2 in place. HEENT: Normocephalic and atraumatic. Sclerae anicteric. No xanthelasmas. Neck: Neck supple. No JVD present. Carotids without bruits. Cardiovascular: irreg and slightly bradycardic; normal S1 and S2; I/VI systolic murmur  Pulmonary/Chest: Effort normal.  Lungs with mildly diminished breath sounds posteriorly. + kyphosis  Abdominal: soft, nontender, nondistended. + bowel sounds; no hepatomegaly  Extremities: No cyanosis. Pulses are 2+ radial/pedal bilaterally. Cap refill brisk. Trace bilateral ankle edema and with compression hose in place  Neurological: No focal deficit. Skin: Skin is warm and dry. Psychiatric: She has a normal mood and affect. Her speech is normal and behavior is normal.     Lab Review:   Lab Results   Component Value Date    TRIG 83 11/24/2018    HDL 60 11/24/2018    HDL 62 07/13/2010    LDLCALC 56 11/24/2018    LABVLDL 17 11/24/2018     Lab Results   Component Value Date     12/01/2018    K 4.6 12/01/2018    K 4.5 09/19/2018    CL 94 12/01/2018    CO2 29 12/01/2018    BUN 33 12/01/2018    CREATININE 1.6 12/01/2018    GLUCOSE 109 12/01/2018    CALCIUM 9.9 12/01/2018      Lab Results   Component Value Date    WBC 4.0 12/01/2018    HGB 10.9 (L) 12/01/2018    HCT 33.8 (L) 12/01/2018    MCV 85.3 12/01/2018     12/01/2018     Corornary angiogram  & Intervention:2/10/2011  1.  The patient has apical cardiomyopathy, with a large area of the left  ventricle, almost two-thirds of it, which is dyskinetic.    2.  Ejection fraction is 25%. 3.  No obstructive coronary artery disease.   4.  Elevated left heart filling pressures of 25 mmHg.     Echo 9/11/2017:  Summary   -Normal left ventricle size, wall thickness and systolic function with an   estimated ejection fraction of 50-55%.  -Mild mitral regurgitation is present.   -Trivial aortic regurgitation is present.   -There is moderate tricuspid regurgitation with RVSP estimated at 63  mmHg. This is suggestive of moderate to severe pulmonary hypertension. Echo 7/2018:   Normal LV size with EF 35%. Global LV dysfunction.   Trivial mitral regurgitation is present.   Severe biatrial enlargement   The right ventricle is normal in size and function.   Severe tricuspid regurgitation.   Systolic pulmonary artery pressure (SPAP) is estimated at 42 mmHg consistent   with mild pulmonary hypertension (RA pressure 3 mmHg).   There is a large left pleural effusion. Echo 11/26/2018:  Suboptimal image quality.   Patient may be in atrial fibrillation.   Overall left ventricular systolic function appears moderately reduced with   an ejection fraction around 40%. Abnormal (paradoxical) septal motion is   present. Cannot exclude other wall motion abnormalities secondary to poor  endocardial definition. Normal left ventricular wall thickness and cavity   size.   Severely dilated left and right atria.   Mild mitral regurgitation.   Moderate tricuspid regurgitation.  IVC size is dilated (>2.1 cm) and collapses < 50% with respiration   consistent with elevated RA pressure (15 mmHg).  Estimated pulmonary artery systolic pressure is severely elevated at 55 mmHg  assuming a right atrial pressure of 15 mmHg.   There is a large pleural effusion. ECG 11/5/2018:  Atrial fib with VR 65; poor RW  Progression: L axis; anterior fasicular block; nonspecific ST changes    Assessment:    1. Chronic combined systolic and diastolic heart failure (HCC)  -LVEF 40% on echo in 11/2018  -continue torsemide   -not on ACE-I/ARB/aldactone d/t low BP and elevated Cr  -low sodium diet and fluid restriction    2.  Chronic atrial fibrillation ([facility]  -controlled VR off BB  -d/w Dr. Jovan Peace:  Continue to monitor  -continue ASA (no long term anticoagulation + H/O falls with resultant hip fx , anemia)  -CHADS Vasc score at least 4  -bradycardia improved     3. Hypotension, unspecified hypotension type  -continue Proamatine    4. Pulmonary HTN ([facility]  -noted echo  -secondary to COPD  -avoid hypoxia and continue diuretic    5. Chronic obstructive pulmonary disease, [facility]  -chronic respiratory failure and on home O2    6. Anemia  -chronic    Plan:  Continue torsemide, proamatine  Emphasized and discussed low-fat/low sodium diet, monitoring of daily weights, worsening signs and symptoms of heart failure and when to call, and the importance of regular exercise and activity as tolerated  Labs from 11/2018 reviewed  D/W Dr. Jovan Peace: Will continue to monitor HR for now  Follow up with Dr. Jovan Peace as scheduled on 2/11/2019 or sooner if needed    Return for as scheduled in February 2019 with Dr. Nahed Amin. Thanks for allowing me to participate in the care of this patient.       Ezequiel Ma, [address], [address], [address] [facility]  Office: [phone]  Fax: [phone]

## 2018-12-06 NOTE — LETTER
[place] [address] [address] [facility]. [address]  Phone: [phone]  Fax: [phone]    SURESH Ashraf CNP        December 7, 2018     [address] [place] [address]    Patient: Alessandra Bedolla  MR Number: [id number]  YOB: 1930  Date of Visit: 12/6/2018    Dear  [address] [place] [address]:    Thank you for the request for consultation for [facility]. If you have questions, please do not hesitate to call me. I look forward to following Kaitlynn along with you.     Sincerely,        SURESH Ashraf CNP

## 2018-12-20 NOTE — CARE COORDINATION
[facility] Transitions Follow Up Call    2018    Patient: Surya Nunn  Patient : 3/11/1930   MRN: [id number]  Reason for Admission: There are no discharge diagnoses documented for the most recent discharge. Discharge Date: 18 RARS: Readmission Risk Score: 36       Spoke with: Attempted to call patient. Left message on machine to call office with update on condition since discharge. Care Transitions Subsequent and Final Call    Subsequent and Final Calls  Care Transitions Interventions  Other Interventions:             Follow Up  Future Appointments  Date Time Provider Department Center   2019 1:00 PM Belkis Smith MD [facility]       Charlie Yee LPN

## 2019-01-01 ENCOUNTER — TELEPHONE (OUTPATIENT)
Dept: CARDIOLOGY CLINIC | Age: 84
End: 2019-01-01

## 2019-01-01 ENCOUNTER — APPOINTMENT (OUTPATIENT)
Dept: CT IMAGING | Age: 84
DRG: 562 | End: 2019-01-01
Payer: MEDICARE

## 2019-01-01 ENCOUNTER — CARE COORDINATION (OUTPATIENT)
Dept: CARE COORDINATION | Age: 84
End: 2019-01-01

## 2019-01-01 ENCOUNTER — APPOINTMENT (OUTPATIENT)
Dept: GENERAL RADIOLOGY | Age: 84
DRG: 562 | End: 2019-01-01
Payer: MEDICARE

## 2019-01-01 ENCOUNTER — APPOINTMENT (OUTPATIENT)
Dept: GENERAL RADIOLOGY | Age: 84
DRG: 291 | End: 2019-01-01
Payer: MEDICARE

## 2019-01-01 ENCOUNTER — HOSPITAL ENCOUNTER (INPATIENT)
Age: 84
LOS: 5 days | Discharge: HOME | DRG: 291 | End: 2019-03-20
Attending: EMERGENCY MEDICINE | Admitting: INTERNAL MEDICINE
Payer: MEDICARE

## 2019-01-01 ENCOUNTER — OFFICE VISIT (OUTPATIENT)
Dept: CARDIOLOGY CLINIC | Age: 84
End: 2019-01-01
Payer: MEDICARE

## 2019-01-01 ENCOUNTER — HOSPITAL ENCOUNTER (INPATIENT)
Age: 84
LOS: 7 days | Discharge: HOSPICE | DRG: 562 | End: 2019-05-14
Attending: EMERGENCY MEDICINE | Admitting: INTERNAL MEDICINE
Payer: MEDICARE

## 2019-01-01 VITALS
WEIGHT: 104.28 LBS | HEIGHT: 59 IN | DIASTOLIC BLOOD PRESSURE: 62 MMHG | SYSTOLIC BLOOD PRESSURE: 95 MMHG | OXYGEN SATURATION: 96 % | BODY MASS INDEX: 21.02 KG/M2 | TEMPERATURE: 97.9 F | HEART RATE: 65 BPM | RESPIRATION RATE: 16 BRPM

## 2019-01-01 VITALS
BODY MASS INDEX: 22.02 KG/M2 | DIASTOLIC BLOOD PRESSURE: 70 MMHG | SYSTOLIC BLOOD PRESSURE: 108 MMHG | HEIGHT: 59 IN | OXYGEN SATURATION: 76 % | HEART RATE: 74 BPM

## 2019-01-01 VITALS
HEIGHT: 59 IN | OXYGEN SATURATION: 88 % | BODY MASS INDEX: 21.91 KG/M2 | RESPIRATION RATE: 24 BRPM | WEIGHT: 108.69 LBS | TEMPERATURE: 98.2 F | HEART RATE: 110 BPM | SYSTOLIC BLOOD PRESSURE: 109 MMHG | DIASTOLIC BLOOD PRESSURE: 62 MMHG

## 2019-01-01 VITALS
BODY MASS INDEX: 19.96 KG/M2 | SYSTOLIC BLOOD PRESSURE: 112 MMHG | WEIGHT: 99 LBS | OXYGEN SATURATION: 98 % | HEIGHT: 59 IN | DIASTOLIC BLOOD PRESSURE: 70 MMHG | HEART RATE: 84 BPM

## 2019-01-01 DIAGNOSIS — I50.9 CONGESTIVE HEART FAILURE, UNSPECIFIED HF CHRONICITY, UNSPECIFIED HEART FAILURE TYPE (HCC): Primary | ICD-10-CM

## 2019-01-01 DIAGNOSIS — I50.43 CHF (CONGESTIVE HEART FAILURE), NYHA CLASS I, ACUTE ON CHRONIC, COMBINED (HCC): ICD-10-CM

## 2019-01-01 DIAGNOSIS — I48.21 PERMANENT ATRIAL FIBRILLATION (HCC): ICD-10-CM
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
## 2019-01-01 DIAGNOSIS — J44.9 CHRONIC OBSTRUCTIVE PULMONARY DISEASE, UNSPECIFIED COPD TYPE (HCC): ICD-10-CM

## 2019-01-01 DIAGNOSIS — I50.42 CHRONIC COMBINED SYSTOLIC AND DIASTOLIC HEART FAILURE (HCC): Primary | ICD-10-CM

## 2019-01-01 DIAGNOSIS — R26.9 GAIT ABNORMALITY: ICD-10-CM

## 2019-01-01 DIAGNOSIS — S42.291A FRACTURE OF HUMERAL HEAD, CLOSED, RIGHT, INITIAL ENCOUNTER: ICD-10-CM

## 2019-01-01 DIAGNOSIS — I27.20 PULMONARY HTN (HCC): ICD-10-CM

## 2019-01-01 DIAGNOSIS — I48.20 CHRONIC ATRIAL FIBRILLATION (HCC): ICD-10-CM

## 2019-01-01 DIAGNOSIS — I50.42 CHRONIC COMBINED SYSTOLIC AND DIASTOLIC HEART FAILURE (HCC): ICD-10-CM

## 2019-01-01 DIAGNOSIS — I10 ESSENTIAL HYPERTENSION: ICD-10-CM

## 2019-01-01 DIAGNOSIS — I10 BENIGN HYPERTENSION: ICD-10-CM

## 2019-01-01 LAB
A/G RATIO: 1 (ref 1.1–2.2)
A/G RATIO: 1.1 (ref 1.1–2.2)
ALBUMIN SERPL-MCNC: 2.3 G/DL (ref 3.4–5)
ALBUMIN SERPL-MCNC: 2.5 G/DL (ref 3.4–5)
ALBUMIN SERPL-MCNC: 3.2 G/DL (ref 3.4–5)
ALBUMIN SERPL-MCNC: 3.8 G/DL (ref 3.4–5)
ALBUMIN SERPL-MCNC: 3.9 G/DL (ref 3.4–5)
ALBUMIN SERPL-MCNC: 4.3 G/DL (ref 3.4–5)
ALP BLD-CCNC: 116 U/L (ref 40–129)
ALP BLD-CCNC: 154 U/L (ref 40–129)
ALT SERPL-CCNC: 25 U/L (ref 10–40)
ALT SERPL-CCNC: 30 U/L (ref 10–40)
ANION GAP SERPL CALCULATED.3IONS-SCNC: 11 MMOL/L (ref 3–16)
ANION GAP SERPL CALCULATED.3IONS-SCNC: 12 MMOL/L (ref 3–16)
ANION GAP SERPL CALCULATED.3IONS-SCNC: 12 MMOL/L (ref 3–16)
ANION GAP SERPL CALCULATED.3IONS-SCNC: 13 MMOL/L (ref 3–16)
ANION GAP SERPL CALCULATED.3IONS-SCNC: 14 MMOL/L (ref 3–16)
ANION GAP SERPL CALCULATED.3IONS-SCNC: 15 MMOL/L (ref 3–16)
ANION GAP SERPL CALCULATED.3IONS-SCNC: 16 MMOL/L (ref 3–16)
ANION GAP SERPL CALCULATED.3IONS-SCNC: 16 MMOL/L (ref 3–16)
ANION GAP SERPL CALCULATED.3IONS-SCNC: 18 MMOL/L (ref 3–16)
ANION GAP SERPL CALCULATED.3IONS-SCNC: 9 MMOL/L (ref 3–16)
APTT: 31.8 SEC (ref 26–36)
AST SERPL-CCNC: 25 U/L (ref 15–37)
AST SERPL-CCNC: 38 U/L (ref 15–37)
BACTERIA: ABNORMAL /HPF
BASE EXCESS ARTERIAL: 1.4 MMOL/L (ref -3–3)
BASE EXCESS ARTERIAL: 4.3 MMOL/L (ref -3–3)
BASOPHILS ABSOLUTE: 0 K/UL (ref 0–0.2)
BASOPHILS RELATIVE PERCENT: 0.2 %
BASOPHILS RELATIVE PERCENT: 0.2 %
BASOPHILS RELATIVE PERCENT: 0.3 %
BASOPHILS RELATIVE PERCENT: 0.4 %
BASOPHILS RELATIVE PERCENT: 0.5 %
BASOPHILS RELATIVE PERCENT: 1.1 %
BILIRUB SERPL-MCNC: 0.3 MG/DL (ref 0–1)
BILIRUB SERPL-MCNC: <0.2 MG/DL (ref 0–1)
BILIRUBIN URINE: NEGATIVE
BLOOD, URINE: NEGATIVE
BUN BLDV-MCNC: 30 MG/DL (ref 7–20)
BUN BLDV-MCNC: 30 MG/DL (ref 7–20)
BUN BLDV-MCNC: 34 MG/DL (ref 7–20)
BUN BLDV-MCNC: 37 MG/DL (ref 7–20)
BUN BLDV-MCNC: 44 MG/DL (ref 7–20)
BUN BLDV-MCNC: 48 MG/DL (ref 7–20)
BUN BLDV-MCNC: 49 MG/DL (ref 7–20)
BUN BLDV-MCNC: 55 MG/DL (ref 7–20)
BUN BLDV-MCNC: 62 MG/DL (ref 7–20)
BUN BLDV-MCNC: 64 MG/DL (ref 7–20)
BUN BLDV-MCNC: 65 MG/DL (ref 7–20)
BUN BLDV-MCNC: 75 MG/DL (ref 7–20)
BUN BLDV-MCNC: 83 MG/DL (ref 7–20)
BUN BLDV-MCNC: 88 MG/DL (ref 7–20)
BUN BLDV-MCNC: 94 MG/DL (ref 7–20)
CALCIUM SERPL-MCNC: 7.9 MG/DL (ref 8.3–10.6)
CALCIUM SERPL-MCNC: 8.8 MG/DL (ref 8.3–10.6)
CALCIUM SERPL-MCNC: 8.9 MG/DL (ref 8.3–10.6)
CALCIUM SERPL-MCNC: 9 MG/DL (ref 8.3–10.6)
CALCIUM SERPL-MCNC: 9.1 MG/DL (ref 8.3–10.6)
CALCIUM SERPL-MCNC: 9.1 MG/DL (ref 8.3–10.6)
CALCIUM SERPL-MCNC: 9.2 MG/DL (ref 8.3–10.6)
CALCIUM SERPL-MCNC: 9.3 MG/DL (ref 8.3–10.6)
CALCIUM SERPL-MCNC: 9.5 MG/DL (ref 8.3–10.6)
CALCIUM SERPL-MCNC: 9.6 MG/DL (ref 8.3–10.6)
CALCIUM SERPL-MCNC: 9.6 MG/DL (ref 8.3–10.6)
CALCIUM SERPL-MCNC: 9.7 MG/DL (ref 8.3–10.6)
CALCIUM SERPL-MCNC: 9.7 MG/DL (ref 8.3–10.6)
CARBOXYHEMOGLOBIN ARTERIAL: 0.7 % (ref 0–1.5)
CARBOXYHEMOGLOBIN ARTERIAL: 1.2 % (ref 0–1.5)
CHLORIDE BLD-SCNC: 100 MMOL/L (ref 99–110)
CHLORIDE BLD-SCNC: 103 MMOL/L (ref 99–110)
CHLORIDE BLD-SCNC: 91 MMOL/L (ref 99–110)
CHLORIDE BLD-SCNC: 91 MMOL/L (ref 99–110)
CHLORIDE BLD-SCNC: 92 MMOL/L (ref 99–110)
CHLORIDE BLD-SCNC: 93 MMOL/L (ref 99–110)
CHLORIDE BLD-SCNC: 94 MMOL/L (ref 99–110)
CHLORIDE BLD-SCNC: 95 MMOL/L (ref 99–110)
CHLORIDE BLD-SCNC: 95 MMOL/L (ref 99–110)
CHLORIDE BLD-SCNC: 96 MMOL/L (ref 99–110)
CHLORIDE BLD-SCNC: 97 MMOL/L (ref 99–110)
CHLORIDE BLD-SCNC: 98 MMOL/L (ref 99–110)
CHLORIDE BLD-SCNC: 98 MMOL/L (ref 99–110)
CHLORIDE BLD-SCNC: 99 MMOL/L (ref 99–110)
CHLORIDE BLD-SCNC: 99 MMOL/L (ref 99–110)
CHOLESTEROL, TOTAL: 150 MG/DL (ref 0–199)
CLARITY: ABNORMAL
CO2: 22 MMOL/L (ref 21–32)
CO2: 22 MMOL/L (ref 21–32)
CO2: 27 MMOL/L (ref 21–32)
CO2: 28 MMOL/L (ref 21–32)
CO2: 29 MMOL/L (ref 21–32)
CO2: 29 MMOL/L (ref 21–32)
CO2: 30 MMOL/L (ref 21–32)
CO2: 31 MMOL/L (ref 21–32)
CO2: 32 MMOL/L (ref 21–32)
CO2: 34 MMOL/L (ref 21–32)
CO2: 35 MMOL/L (ref 21–32)
COLOR: YELLOW
COMMENT UA: ABNORMAL
CREAT SERPL-MCNC: 1.2 MG/DL (ref 0.6–1.2)
CREAT SERPL-MCNC: 1.3 MG/DL (ref 0.6–1.2)
CREAT SERPL-MCNC: 1.3 MG/DL (ref 0.6–1.2)
CREAT SERPL-MCNC: 1.4 MG/DL (ref 0.6–1.2)
CREAT SERPL-MCNC: 1.5 MG/DL (ref 0.6–1.2)
CREAT SERPL-MCNC: 1.5 MG/DL (ref 0.6–1.2)
CREAT SERPL-MCNC: 1.7 MG/DL (ref 0.6–1.2)
CREAT SERPL-MCNC: 1.8 MG/DL (ref 0.6–1.2)
CREAT SERPL-MCNC: 1.8 MG/DL (ref 0.6–1.2)
CREAT SERPL-MCNC: 2.1 MG/DL (ref 0.6–1.2)
CREAT SERPL-MCNC: 2.1 MG/DL (ref 0.6–1.2)
CREAT SERPL-MCNC: 2.8 MG/DL (ref 0.6–1.2)
CULTURE, RESPIRATORY: ABNORMAL
CULTURE, RESPIRATORY: ABNORMAL
EKG ATRIAL RATE: 113 BPM
EKG ATRIAL RATE: 182 BPM
EKG DIAGNOSIS: NORMAL
EKG DIAGNOSIS: NORMAL
EKG Q-T INTERVAL: 280 MS
EKG Q-T INTERVAL: 360 MS
EKG QRS DURATION: 86 MS
EKG QRS DURATION: 86 MS
EKG QTC CALCULATION (BAZETT): 445 MS
EKG QTC CALCULATION (BAZETT): 469 MS
EKG R AXIS: -52 DEGREES
EKG R AXIS: -66 DEGREES
EKG T AXIS: 108 DEGREES
EKG T AXIS: 135 DEGREES
EKG VENTRICULAR RATE: 169 BPM
EKG VENTRICULAR RATE: 92 BPM
EOSINOPHILS ABSOLUTE: 0 K/UL (ref 0–0.6)
EOSINOPHILS ABSOLUTE: 0.1 K/UL (ref 0–0.6)
EOSINOPHILS ABSOLUTE: 0.1 K/UL (ref 0–0.6)
EOSINOPHILS RELATIVE PERCENT: 0.3 %
EOSINOPHILS RELATIVE PERCENT: 0.6 %
EOSINOPHILS RELATIVE PERCENT: 0.7 %
EOSINOPHILS RELATIVE PERCENT: 1.1 %
EOSINOPHILS RELATIVE PERCENT: 1.1 %
EOSINOPHILS RELATIVE PERCENT: 1.3 %
EPITHELIAL CELLS, UA: 0 /HPF (ref 0–5)
GFR AFRICAN AMERICAN: 19
GFR AFRICAN AMERICAN: 27
GFR AFRICAN AMERICAN: 27
GFR AFRICAN AMERICAN: 32
GFR AFRICAN AMERICAN: 32
GFR AFRICAN AMERICAN: 34
GFR AFRICAN AMERICAN: 40
GFR AFRICAN AMERICAN: 40
GFR AFRICAN AMERICAN: 43
GFR AFRICAN AMERICAN: 47
GFR AFRICAN AMERICAN: 47
GFR AFRICAN AMERICAN: 51
GFR NON-AFRICAN AMERICAN: 16
GFR NON-AFRICAN AMERICAN: 22
GFR NON-AFRICAN AMERICAN: 22
GFR NON-AFRICAN AMERICAN: 26
GFR NON-AFRICAN AMERICAN: 26
GFR NON-AFRICAN AMERICAN: 28
GFR NON-AFRICAN AMERICAN: 33
GFR NON-AFRICAN AMERICAN: 33
GFR NON-AFRICAN AMERICAN: 35
GFR NON-AFRICAN AMERICAN: 39
GFR NON-AFRICAN AMERICAN: 39
GFR NON-AFRICAN AMERICAN: 42
GLOBULIN: 3.8 G/DL
GLOBULIN: 3.9 G/DL
GLUCOSE BLD-MCNC: 103 MG/DL (ref 70–99)
GLUCOSE BLD-MCNC: 104 MG/DL (ref 70–99)
GLUCOSE BLD-MCNC: 105 MG/DL (ref 70–99)
GLUCOSE BLD-MCNC: 107 MG/DL (ref 70–99)
GLUCOSE BLD-MCNC: 118 MG/DL (ref 70–99)
GLUCOSE BLD-MCNC: 139 MG/DL (ref 70–99)
GLUCOSE BLD-MCNC: 157 MG/DL (ref 70–99)
GLUCOSE BLD-MCNC: 65 MG/DL (ref 70–99)
GLUCOSE BLD-MCNC: 67 MG/DL (ref 70–99)
GLUCOSE BLD-MCNC: 72 MG/DL (ref 70–99)
GLUCOSE BLD-MCNC: 73 MG/DL (ref 70–99)
GLUCOSE BLD-MCNC: 73 MG/DL (ref 70–99)
GLUCOSE BLD-MCNC: 76 MG/DL (ref 70–99)
GLUCOSE BLD-MCNC: 79 MG/DL (ref 70–99)
GLUCOSE BLD-MCNC: 80 MG/DL (ref 70–99)
GLUCOSE BLD-MCNC: 81 MG/DL (ref 70–99)
GLUCOSE BLD-MCNC: 81 MG/DL (ref 70–99)
GLUCOSE BLD-MCNC: 82 MG/DL (ref 70–99)
GLUCOSE BLD-MCNC: 83 MG/DL (ref 70–99)
GLUCOSE BLD-MCNC: 83 MG/DL (ref 70–99)
GLUCOSE BLD-MCNC: 84 MG/DL (ref 70–99)
GLUCOSE BLD-MCNC: 85 MG/DL (ref 70–99)
GLUCOSE BLD-MCNC: 86 MG/DL (ref 70–99)
GLUCOSE BLD-MCNC: 89 MG/DL (ref 70–99)
GLUCOSE BLD-MCNC: 90 MG/DL (ref 70–99)
GLUCOSE BLD-MCNC: 92 MG/DL (ref 70–99)
GLUCOSE BLD-MCNC: 93 MG/DL (ref 70–99)
GLUCOSE BLD-MCNC: 93 MG/DL (ref 70–99)
GLUCOSE BLD-MCNC: 97 MG/DL (ref 70–99)
GLUCOSE BLD-MCNC: 98 MG/DL (ref 70–99)
GLUCOSE URINE: NEGATIVE MG/DL
GRAM STAIN RESULT: ABNORMAL
HCO3 ARTERIAL: 27.9 MMOL/L (ref 21–29)
HCO3 ARTERIAL: 28.5 MMOL/L (ref 21–29)
HCT VFR BLD CALC: 25.3 % (ref 36–48)
HCT VFR BLD CALC: 25.9 % (ref 36–48)
HCT VFR BLD CALC: 27.9 % (ref 36–48)
HCT VFR BLD CALC: 28.8 % (ref 36–48)
HCT VFR BLD CALC: 29.7 % (ref 36–48)
HCT VFR BLD CALC: 33.4 % (ref 36–48)
HCT VFR BLD CALC: 34 % (ref 36–48)
HCT VFR BLD CALC: 36.1 % (ref 36–48)
HCT VFR BLD CALC: 37.4 % (ref 36–48)
HDLC SERPL-MCNC: 63 MG/DL (ref 40–60)
HEMOGLOBIN, ART, EXTENDED: 12.1 G/DL (ref 12–16)
HEMOGLOBIN, ART, EXTENDED: 8.9 G/DL (ref 12–16)
HEMOGLOBIN: 10.9 G/DL (ref 12–16)
HEMOGLOBIN: 11.1 G/DL (ref 12–16)
HEMOGLOBIN: 11.7 G/DL (ref 12–16)
HEMOGLOBIN: 12.1 G/DL (ref 12–16)
HEMOGLOBIN: 8.6 G/DL (ref 12–16)
HEMOGLOBIN: 8.7 G/DL (ref 12–16)
HEMOGLOBIN: 9.4 G/DL (ref 12–16)
HEMOGLOBIN: 9.6 G/DL (ref 12–16)
HEMOGLOBIN: 9.9 G/DL (ref 12–16)
HYALINE CASTS: 8 /LPF (ref 0–8)
INR BLD: 0.94 (ref 0.86–1.14)
INR BLD: 0.97 (ref 0.86–1.14)
KETONES, URINE: NEGATIVE MG/DL
L. PNEUMOPHILA SEROGP 1 UR AG: NORMAL
LACTIC ACID: 1 MMOL/L (ref 0.4–2)
LDL CHOLESTEROL CALCULATED: 78 MG/DL
LEUKOCYTE ESTERASE, URINE: ABNORMAL
LV EF: 38 %
LVEF MODALITY: NORMAL
LYMPHOCYTES ABSOLUTE: 0.6 K/UL (ref 1–5.1)
LYMPHOCYTES ABSOLUTE: 0.7 K/UL (ref 1–5.1)
LYMPHOCYTES ABSOLUTE: 0.8 K/UL (ref 1–5.1)
LYMPHOCYTES ABSOLUTE: 1 K/UL (ref 1–5.1)
LYMPHOCYTES ABSOLUTE: 1.4 K/UL (ref 1–5.1)
LYMPHOCYTES ABSOLUTE: 1.5 K/UL (ref 1–5.1)
LYMPHOCYTES RELATIVE PERCENT: 17.1 %
LYMPHOCYTES RELATIVE PERCENT: 26.7 %
LYMPHOCYTES RELATIVE PERCENT: 37.4 %
LYMPHOCYTES RELATIVE PERCENT: 38.3 %
LYMPHOCYTES RELATIVE PERCENT: 7.1 %
LYMPHOCYTES RELATIVE PERCENT: 7.2 %
MAGNESIUM: 1.7 MG/DL (ref 1.8–2.4)
MAGNESIUM: 1.9 MG/DL (ref 1.8–2.4)
MAGNESIUM: 1.9 MG/DL (ref 1.8–2.4)
MAGNESIUM: 2 MG/DL (ref 1.8–2.4)
MAGNESIUM: 2.3 MG/DL (ref 1.8–2.4)
MAGNESIUM: 2.3 MG/DL (ref 1.8–2.4)
MCH RBC QN AUTO: 29.2 PG (ref 26–34)
MCH RBC QN AUTO: 29.3 PG (ref 26–34)
MCH RBC QN AUTO: 29.3 PG (ref 26–34)
MCH RBC QN AUTO: 29.4 PG (ref 26–34)
MCH RBC QN AUTO: 29.7 PG (ref 26–34)
MCH RBC QN AUTO: 30.6 PG (ref 26–34)
MCH RBC QN AUTO: 31 PG (ref 26–34)
MCH RBC QN AUTO: 31.5 PG (ref 26–34)
MCHC RBC AUTO-ENTMCNC: 32.4 G/DL (ref 31–36)
MCHC RBC AUTO-ENTMCNC: 32.4 G/DL (ref 31–36)
MCHC RBC AUTO-ENTMCNC: 32.5 G/DL (ref 31–36)
MCHC RBC AUTO-ENTMCNC: 32.7 G/DL (ref 31–36)
MCHC RBC AUTO-ENTMCNC: 33.2 G/DL (ref 31–36)
MCHC RBC AUTO-ENTMCNC: 33.4 G/DL (ref 31–36)
MCHC RBC AUTO-ENTMCNC: 33.8 G/DL (ref 31–36)
MCHC RBC AUTO-ENTMCNC: 34.1 G/DL (ref 31–36)
MCV RBC AUTO: 89.3 FL (ref 80–100)
MCV RBC AUTO: 89.8 FL (ref 80–100)
MCV RBC AUTO: 90.1 FL (ref 80–100)
MCV RBC AUTO: 90.4 FL (ref 80–100)
MCV RBC AUTO: 90.5 FL (ref 80–100)
MCV RBC AUTO: 90.6 FL (ref 80–100)
MCV RBC AUTO: 90.7 FL (ref 80–100)
MCV RBC AUTO: 94.5 FL (ref 80–100)
METHEMOGLOBIN ARTERIAL: 1.5 %
METHEMOGLOBIN ARTERIAL: 1.7 %
MICROSCOPIC EXAMINATION: YES
MONOCYTES ABSOLUTE: 0.3 K/UL (ref 0–1.3)
MONOCYTES ABSOLUTE: 0.4 K/UL (ref 0–1.3)
MONOCYTES ABSOLUTE: 0.4 K/UL (ref 0–1.3)
MONOCYTES ABSOLUTE: 0.6 K/UL (ref 0–1.3)
MONOCYTES ABSOLUTE: 0.7 K/UL (ref 0–1.3)
MONOCYTES ABSOLUTE: 0.7 K/UL (ref 0–1.3)
MONOCYTES RELATIVE PERCENT: 11.5 %
MONOCYTES RELATIVE PERCENT: 13.9 %
MONOCYTES RELATIVE PERCENT: 7.2 %
MONOCYTES RELATIVE PERCENT: 7.6 %
MONOCYTES RELATIVE PERCENT: 7.9 %
MONOCYTES RELATIVE PERCENT: 8.6 %
NEUTROPHILS ABSOLUTE: 1.8 K/UL (ref 1.7–7.7)
NEUTROPHILS ABSOLUTE: 1.9 K/UL (ref 1.7–7.7)
NEUTROPHILS ABSOLUTE: 2.5 K/UL (ref 1.7–7.7)
NEUTROPHILS ABSOLUTE: 3.4 K/UL (ref 1.7–7.7)
NEUTROPHILS ABSOLUTE: 7.4 K/UL (ref 1.7–7.7)
NEUTROPHILS ABSOLUTE: 7.8 K/UL (ref 1.7–7.7)
NEUTROPHILS RELATIVE PERCENT: 46.9 %
NEUTROPHILS RELATIVE PERCENT: 48.4 %
NEUTROPHILS RELATIVE PERCENT: 63.2 %
NEUTROPHILS RELATIVE PERCENT: 74.4 %
NEUTROPHILS RELATIVE PERCENT: 84 %
NEUTROPHILS RELATIVE PERCENT: 84.8 %
NITRITE, URINE: NEGATIVE
O2 CONTENT ARTERIAL: 11 ML/DL
O2 CONTENT ARTERIAL: 17 ML/DL
O2 SAT, ARTERIAL: 93.4 %
O2 SAT, ARTERIAL: 98.3 %
O2 THERAPY: ABNORMAL
O2 THERAPY: ABNORMAL
ORGANISM: ABNORMAL
ORGANISM: ABNORMAL
PCO2 ARTERIAL: 43.3 MMHG (ref 35–45)
PCO2 ARTERIAL: 55.6 MMHG (ref 35–45)
PDW BLD-RTO: 16 % (ref 12.4–15.4)
PDW BLD-RTO: 16.4 % (ref 12.4–15.4)
PDW BLD-RTO: 16.6 % (ref 12.4–15.4)
PDW BLD-RTO: 17.3 % (ref 12.4–15.4)
PDW BLD-RTO: 17.5 % (ref 12.4–15.4)
PDW BLD-RTO: 18.7 % (ref 12.4–15.4)
PDW BLD-RTO: 18.7 % (ref 12.4–15.4)
PDW BLD-RTO: 19.5 % (ref 12.4–15.4)
PERFORMED ON: ABNORMAL
PERFORMED ON: NORMAL
PH ARTERIAL: 7.32 (ref 7.35–7.45)
PH ARTERIAL: 7.43 (ref 7.35–7.45)
PH UA: 5.5 (ref 5–8)
PHOSPHORUS: 3.8 MG/DL (ref 2.5–4.9)
PHOSPHORUS: 3.9 MG/DL (ref 2.5–4.9)
PHOSPHORUS: 4.3 MG/DL (ref 2.5–4.9)
PHOSPHORUS: 4.6 MG/DL (ref 2.5–4.9)
PLATELET # BLD: 122 K/UL (ref 135–450)
PLATELET # BLD: 123 K/UL (ref 135–450)
PLATELET # BLD: 139 K/UL (ref 135–450)
PLATELET # BLD: 143 K/UL (ref 135–450)
PLATELET # BLD: 148 K/UL (ref 135–450)
PLATELET # BLD: 193 K/UL (ref 135–450)
PLATELET # BLD: 231 K/UL (ref 135–450)
PLATELET # BLD: 284 K/UL (ref 135–450)
PMV BLD AUTO: 7.6 FL (ref 5–10.5)
PMV BLD AUTO: 7.7 FL (ref 5–10.5)
PMV BLD AUTO: 8 FL (ref 5–10.5)
PMV BLD AUTO: 8.1 FL (ref 5–10.5)
PMV BLD AUTO: 8.2 FL (ref 5–10.5)
PMV BLD AUTO: 8.3 FL (ref 5–10.5)
PO2 ARTERIAL: 230 MMHG (ref 75–108)
PO2 ARTERIAL: 65.5 MMHG (ref 75–108)
POTASSIUM REFLEX MAGNESIUM: 4.1 MMOL/L (ref 3.5–5.1)
POTASSIUM REFLEX MAGNESIUM: 4.3 MMOL/L (ref 3.5–5.1)
POTASSIUM REFLEX MAGNESIUM: 4.9 MMOL/L (ref 3.5–5.1)
POTASSIUM REFLEX MAGNESIUM: 5.3 MMOL/L (ref 3.5–5.1)
POTASSIUM REFLEX MAGNESIUM: 5.4 MMOL/L (ref 3.5–5.1)
POTASSIUM SERPL-SCNC: 3.7 MMOL/L (ref 3.5–5.1)
POTASSIUM SERPL-SCNC: 3.8 MMOL/L (ref 3.5–5.1)
POTASSIUM SERPL-SCNC: 4.1 MMOL/L (ref 3.5–5.1)
POTASSIUM SERPL-SCNC: 4.3 MMOL/L (ref 3.5–5.1)
POTASSIUM SERPL-SCNC: 4.4 MMOL/L (ref 3.5–5.1)
POTASSIUM SERPL-SCNC: 4.5 MMOL/L (ref 3.5–5.1)
POTASSIUM SERPL-SCNC: 4.6 MMOL/L (ref 3.5–5.1)
POTASSIUM SERPL-SCNC: 4.6 MMOL/L (ref 3.5–5.1)
POTASSIUM SERPL-SCNC: 4.7 MMOL/L (ref 3.5–5.1)
POTASSIUM SERPL-SCNC: 5 MMOL/L (ref 3.5–5.1)
POTASSIUM SERPL-SCNC: 5.4 MMOL/L (ref 3.5–5.1)
POTASSIUM SERPL-SCNC: 6 MMOL/L (ref 3.5–5.1)
PRO-BNP: 3613 PG/ML (ref 0–449)
PRO-BNP: 4163 PG/ML (ref 0–449)
PRO-BNP: 9555 PG/ML (ref 0–449)
PRO-BNP: ABNORMAL PG/ML (ref 0–449)
PROCALCITONIN: 1.05 NG/ML (ref 0–0.15)
PROCALCITONIN: 1.9 NG/ML (ref 0–0.15)
PROTEIN UA: ABNORMAL MG/DL
PROTHROMBIN TIME: 10.7 SEC (ref 9.8–13)
PROTHROMBIN TIME: 11.1 SEC (ref 9.8–13)
RAPID INFLUENZA  B AGN: NEGATIVE
RAPID INFLUENZA A AGN: NEGATIVE
RBC # BLD: 2.74 M/UL (ref 4–5.2)
RBC # BLD: 2.79 M/UL (ref 4–5.2)
RBC # BLD: 3.08 M/UL (ref 4–5.2)
RBC # BLD: 3.22 M/UL (ref 4–5.2)
RBC # BLD: 3.69 M/UL (ref 4–5.2)
RBC # BLD: 3.79 M/UL (ref 4–5.2)
RBC # BLD: 4.01 M/UL (ref 4–5.2)
RBC # BLD: 4.14 M/UL (ref 4–5.2)
RBC UA: 2 /HPF (ref 0–4)
REASON FOR REJECTION: NORMAL
REJECTED TEST: NORMAL
REPORT: NORMAL
REPORT: NORMAL
RESPIRATORY PANEL PCR: ABNORMAL
RESPIRATORY PANEL PCR: ABNORMAL
RESPIRATORY PANEL PCR: NORMAL
SODIUM BLD-SCNC: 136 MMOL/L (ref 136–145)
SODIUM BLD-SCNC: 137 MMOL/L (ref 136–145)
SODIUM BLD-SCNC: 138 MMOL/L (ref 136–145)
SODIUM BLD-SCNC: 139 MMOL/L (ref 136–145)
SODIUM BLD-SCNC: 139 MMOL/L (ref 136–145)
SODIUM BLD-SCNC: 140 MMOL/L (ref 136–145)
SODIUM BLD-SCNC: 140 MMOL/L (ref 136–145)
SODIUM BLD-SCNC: 141 MMOL/L (ref 136–145)
SODIUM BLD-SCNC: 141 MMOL/L (ref 136–145)
SODIUM BLD-SCNC: 143 MMOL/L (ref 136–145)
SODIUM BLD-SCNC: 143 MMOL/L (ref 136–145)
SPECIFIC GRAVITY UA: 1.01 (ref 1–1.03)
STREP PNEUMONIAE ANTIGEN, URINE: NORMAL
TCO2 ARTERIAL: 29.6 MMOL/L
TCO2 ARTERIAL: 29.8 MMOL/L
TOTAL PROTEIN: 7.7 G/DL (ref 6.4–8.2)
TOTAL PROTEIN: 8.2 G/DL (ref 6.4–8.2)
TRIGL SERPL-MCNC: 45 MG/DL (ref 0–150)
TROPONIN: <0.01 NG/ML
TSH REFLEX: 0.95 UIU/ML (ref 0.27–4.2)
URINE TYPE: ABNORMAL
UROBILINOGEN, URINE: 0.2 E.U./DL
VLDLC SERPL CALC-MCNC: 9 MG/DL
WBC # BLD: 11.6 K/UL (ref 4–11)
WBC # BLD: 3.8 K/UL (ref 4–11)
WBC # BLD: 3.9 K/UL (ref 4–11)
WBC # BLD: 4 K/UL (ref 4–11)
WBC # BLD: 4.6 K/UL (ref 4–11)
WBC # BLD: 6.9 K/UL (ref 4–11)
WBC # BLD: 8.8 K/UL (ref 4–11)
WBC # BLD: 9.2 K/UL (ref 4–11)
WBC UA: 117 /HPF (ref 0–5)

## 2019-01-01 PROCEDURE — 80048 BASIC METABOLIC PNL TOTAL CA: CPT

## 2019-01-01 PROCEDURE — 6360000002 HC RX W HCPCS: Performed by: INTERNAL MEDICINE

## 2019-01-01 PROCEDURE — 94668 MNPJ CHEST WALL SBSQ: CPT

## 2019-01-01 PROCEDURE — 36415 COLL VENOUS BLD VENIPUNCTURE: CPT

## 2019-01-01 PROCEDURE — 6370000000 HC RX 637 (ALT 250 FOR IP): Performed by: INTERNAL MEDICINE

## 2019-01-01 PROCEDURE — 80069 RENAL FUNCTION PANEL: CPT

## 2019-01-01 PROCEDURE — 6370000000 HC RX 637 (ALT 250 FOR IP): Performed by: NURSE PRACTITIONER

## 2019-01-01 PROCEDURE — 94667 MNPJ CHEST WALL 1ST: CPT

## 2019-01-01 PROCEDURE — 1200000000 HC SEMI PRIVATE

## 2019-01-01 PROCEDURE — 84484 ASSAY OF TROPONIN QUANT: CPT

## 2019-01-01 PROCEDURE — 83735 ASSAY OF MAGNESIUM: CPT

## 2019-01-01 PROCEDURE — 97110 THERAPEUTIC EXERCISES: CPT

## 2019-01-01 PROCEDURE — 1036F TOBACCO NON-USER: CPT | Performed by: INTERNAL MEDICINE

## 2019-01-01 PROCEDURE — 6370000000 HC RX 637 (ALT 250 FOR IP): Performed by: HOSPITALIST

## 2019-01-01 PROCEDURE — 6370000000 HC RX 637 (ALT 250 FOR IP): Performed by: PHYSICIAN ASSISTANT

## 2019-01-01 PROCEDURE — G8420 CALC BMI NORM PARAMETERS: HCPCS | Performed by: INTERNAL MEDICINE

## 2019-01-01 PROCEDURE — 6370000000 HC RX 637 (ALT 250 FOR IP)

## 2019-01-01 PROCEDURE — 94660 CPAP INITIATION&MGMT: CPT

## 2019-01-01 PROCEDURE — 2700000000 HC OXYGEN THERAPY PER DAY

## 2019-01-01 PROCEDURE — G8926 SPIRO NO PERF OR DOC: HCPCS | Performed by: NURSE PRACTITIONER

## 2019-01-01 PROCEDURE — 2060000000 HC ICU INTERMEDIATE R&B

## 2019-01-01 PROCEDURE — 94761 N-INVAS EAR/PLS OXIMETRY MLT: CPT

## 2019-01-01 PROCEDURE — 99213 OFFICE O/P EST LOW 20 MIN: CPT | Performed by: NURSE PRACTITIONER

## 2019-01-01 PROCEDURE — 6360000002 HC RX W HCPCS: Performed by: EMERGENCY MEDICINE

## 2019-01-01 PROCEDURE — 93005 ELECTROCARDIOGRAM TRACING: CPT | Performed by: EMERGENCY MEDICINE

## 2019-01-01 PROCEDURE — 97162 PT EVAL MOD COMPLEX 30 MIN: CPT | Performed by: PHYSICAL THERAPIST

## 2019-01-01 PROCEDURE — 87633 RESP VIRUS 12-25 TARGETS: CPT

## 2019-01-01 PROCEDURE — 85027 COMPLETE CBC AUTOMATED: CPT

## 2019-01-01 PROCEDURE — 99222 1ST HOSP IP/OBS MODERATE 55: CPT | Performed by: ORTHOPAEDIC SURGERY

## 2019-01-01 PROCEDURE — 97530 THERAPEUTIC ACTIVITIES: CPT | Performed by: PHYSICAL THERAPIST

## 2019-01-01 PROCEDURE — 71045 X-RAY EXAM CHEST 1 VIEW: CPT

## 2019-01-01 PROCEDURE — 2000000000 HC ICU R&B

## 2019-01-01 PROCEDURE — 99223 1ST HOSP IP/OBS HIGH 75: CPT | Performed by: INTERNAL MEDICINE

## 2019-01-01 PROCEDURE — 94640 AIRWAY INHALATION TREATMENT: CPT

## 2019-01-01 PROCEDURE — 6360000002 HC RX W HCPCS: Performed by: HOSPITALIST

## 2019-01-01 PROCEDURE — 2580000003 HC RX 258: Performed by: INTERNAL MEDICINE

## 2019-01-01 PROCEDURE — 87205 SMEAR GRAM STAIN: CPT

## 2019-01-01 PROCEDURE — 99232 SBSQ HOSP IP/OBS MODERATE 35: CPT | Performed by: INTERNAL MEDICINE

## 2019-01-01 PROCEDURE — 97116 GAIT TRAINING THERAPY: CPT | Performed by: PHYSICAL THERAPIST

## 2019-01-01 PROCEDURE — 83880 ASSAY OF NATRIURETIC PEPTIDE: CPT

## 2019-01-01 PROCEDURE — 97530 THERAPEUTIC ACTIVITIES: CPT

## 2019-01-01 PROCEDURE — G8427 DOCREV CUR MEDS BY ELIG CLIN: HCPCS | Performed by: INTERNAL MEDICINE

## 2019-01-01 PROCEDURE — 87486 CHLMYD PNEUM DNA AMP PROBE: CPT

## 2019-01-01 PROCEDURE — 4040F PNEUMOC VAC/ADMIN/RCVD: CPT | Performed by: NURSE PRACTITIONER

## 2019-01-01 PROCEDURE — 94664 DEMO&/EVAL PT USE INHALER: CPT

## 2019-01-01 PROCEDURE — 1090F PRES/ABSN URINE INCON ASSESS: CPT | Performed by: INTERNAL MEDICINE

## 2019-01-01 PROCEDURE — 85014 HEMATOCRIT: CPT

## 2019-01-01 PROCEDURE — 97166 OT EVAL MOD COMPLEX 45 MIN: CPT

## 2019-01-01 PROCEDURE — 97116 GAIT TRAINING THERAPY: CPT

## 2019-01-01 PROCEDURE — 80061 LIPID PANEL: CPT

## 2019-01-01 PROCEDURE — 82803 BLOOD GASES ANY COMBINATION: CPT

## 2019-01-01 PROCEDURE — 94760 N-INVAS EAR/PLS OXIMETRY 1: CPT

## 2019-01-01 PROCEDURE — 99233 SBSQ HOSP IP/OBS HIGH 50: CPT | Performed by: INTERNAL MEDICINE

## 2019-01-01 PROCEDURE — 93010 ELECTROCARDIOGRAM REPORT: CPT | Performed by: INTERNAL MEDICINE

## 2019-01-01 PROCEDURE — 94762 N-INVAS EAR/PLS OXIMTRY CONT: CPT

## 2019-01-01 PROCEDURE — 85025 COMPLETE CBC W/AUTO DIFF WBC: CPT

## 2019-01-01 PROCEDURE — 84145 PROCALCITONIN (PCT): CPT

## 2019-01-01 PROCEDURE — 72125 CT NECK SPINE W/O DYE: CPT

## 2019-01-01 PROCEDURE — 6360000002 HC RX W HCPCS: Performed by: NURSE PRACTITIONER

## 2019-01-01 PROCEDURE — 6360000002 HC RX W HCPCS: Performed by: PHYSICIAN ASSISTANT

## 2019-01-01 PROCEDURE — 99285 EMERGENCY DEPT VISIT HI MDM: CPT

## 2019-01-01 PROCEDURE — G8427 DOCREV CUR MEDS BY ELIG CLIN: HCPCS | Performed by: NURSE PRACTITIONER

## 2019-01-01 PROCEDURE — 6370000000 HC RX 637 (ALT 250 FOR IP): Performed by: EMERGENCY MEDICINE

## 2019-01-01 PROCEDURE — 87804 INFLUENZA ASSAY W/OPTIC: CPT

## 2019-01-01 PROCEDURE — 1101F PT FALLS ASSESS-DOCD LE1/YR: CPT | Performed by: INTERNAL MEDICINE

## 2019-01-01 PROCEDURE — 4040F PNEUMOC VAC/ADMIN/RCVD: CPT | Performed by: INTERNAL MEDICINE

## 2019-01-01 PROCEDURE — 23600 CLTX PROX HUMRL FX W/O MNPJ: CPT | Performed by: ORTHOPAEDIC SURGERY

## 2019-01-01 PROCEDURE — 81001 URINALYSIS AUTO W/SCOPE: CPT

## 2019-01-01 PROCEDURE — 85610 PROTHROMBIN TIME: CPT

## 2019-01-01 PROCEDURE — 87449 NOS EACH ORGANISM AG IA: CPT

## 2019-01-01 PROCEDURE — 2580000003 HC RX 258: Performed by: PHYSICIAN ASSISTANT

## 2019-01-01 PROCEDURE — 96374 THER/PROPH/DIAG INJ IV PUSH: CPT

## 2019-01-01 PROCEDURE — 1111F DSCHRG MED/CURRENT MED MERGE: CPT | Performed by: NURSE PRACTITIONER

## 2019-01-01 PROCEDURE — 99214 OFFICE O/P EST MOD 30 MIN: CPT | Performed by: INTERNAL MEDICINE

## 2019-01-01 PROCEDURE — 70450 CT HEAD/BRAIN W/O DYE: CPT

## 2019-01-01 PROCEDURE — 85018 HEMOGLOBIN: CPT

## 2019-01-01 PROCEDURE — 87040 BLOOD CULTURE FOR BACTERIA: CPT

## 2019-01-01 PROCEDURE — APPNB15 APP NON BILLABLE TIME 0-15 MINS: Performed by: NURSE PRACTITIONER

## 2019-01-01 PROCEDURE — 84443 ASSAY THYROID STIM HORMONE: CPT

## 2019-01-01 PROCEDURE — 3023F SPIROM DOC REV: CPT | Performed by: NURSE PRACTITIONER

## 2019-01-01 PROCEDURE — 87070 CULTURE OTHR SPECIMN AEROBIC: CPT

## 2019-01-01 PROCEDURE — 97162 PT EVAL MOD COMPLEX 30 MIN: CPT

## 2019-01-01 PROCEDURE — G8484 FLU IMMUNIZE NO ADMIN: HCPCS | Performed by: NURSE PRACTITIONER

## 2019-01-01 PROCEDURE — 80053 COMPREHEN METABOLIC PANEL: CPT

## 2019-01-01 PROCEDURE — 1123F ACP DISCUSS/DSCN MKR DOCD: CPT | Performed by: INTERNAL MEDICINE

## 2019-01-01 PROCEDURE — 73502 X-RAY EXAM HIP UNI 2-3 VIEWS: CPT

## 2019-01-01 PROCEDURE — 73030 X-RAY EXAM OF SHOULDER: CPT

## 2019-01-01 PROCEDURE — 87581 M.PNEUMON DNA AMP PROBE: CPT

## 2019-01-01 PROCEDURE — 87077 CULTURE AEROBIC IDENTIFY: CPT

## 2019-01-01 PROCEDURE — 97535 SELF CARE MNGMENT TRAINING: CPT

## 2019-01-01 PROCEDURE — 85730 THROMBOPLASTIN TIME PARTIAL: CPT

## 2019-01-01 PROCEDURE — 93000 ELECTROCARDIOGRAM COMPLETE: CPT | Performed by: INTERNAL MEDICINE

## 2019-01-01 PROCEDURE — 99232 SBSQ HOSP IP/OBS MODERATE 35: CPT | Performed by: NURSE PRACTITIONER

## 2019-01-01 PROCEDURE — G8484 FLU IMMUNIZE NO ADMIN: HCPCS | Performed by: INTERNAL MEDICINE

## 2019-01-01 PROCEDURE — 93306 TTE W/DOPPLER COMPLETE: CPT

## 2019-01-01 PROCEDURE — 83605 ASSAY OF LACTIC ACID: CPT

## 2019-01-01 PROCEDURE — 1090F PRES/ABSN URINE INCON ASSESS: CPT | Performed by: NURSE PRACTITIONER

## 2019-01-01 PROCEDURE — 51702 INSERT TEMP BLADDER CATH: CPT

## 2019-01-01 PROCEDURE — 36600 WITHDRAWAL OF ARTERIAL BLOOD: CPT

## 2019-01-01 PROCEDURE — 87798 DETECT AGENT NOS DNA AMP: CPT

## 2019-01-01 PROCEDURE — 1123F ACP DISCUSS/DSCN MKR DOCD: CPT | Performed by: NURSE PRACTITIONER

## 2019-01-01 PROCEDURE — G8420 CALC BMI NORM PARAMETERS: HCPCS | Performed by: NURSE PRACTITIONER

## 2019-01-01 PROCEDURE — 2580000003 HC RX 258: Performed by: NURSE PRACTITIONER

## 2019-01-01 PROCEDURE — 1036F TOBACCO NON-USER: CPT | Performed by: NURSE PRACTITIONER

## 2019-01-01 PROCEDURE — 87186 SC STD MICRODIL/AGAR DIL: CPT

## 2019-01-01 RX ORDER — IPRATROPIUM BROMIDE AND ALBUTEROL SULFATE 2.5; .5 MG/3ML; MG/3ML
1 SOLUTION RESPIRATORY (INHALATION) EVERY 4 HOURS PRN
Status: DISCONTINUED | OUTPATIENT
Start: 2019-01-01 | End: 2019-01-01

## 2019-01-01 RX ORDER — FUROSEMIDE 10 MG/ML
40 INJECTION INTRAMUSCULAR; INTRAVENOUS ONCE
Status: COMPLETED | OUTPATIENT
Start: 2019-01-01 | End: 2019-01-01

## 2019-01-01 RX ORDER — ALBUTEROL SULFATE 90 UG/1
2 AEROSOL, METERED RESPIRATORY (INHALATION) EVERY 4 HOURS PRN
Status: DISCONTINUED | OUTPATIENT
Start: 2019-01-01 | End: 2019-01-01

## 2019-01-01 RX ORDER — SENNA AND DOCUSATE SODIUM 50; 8.6 MG/1; MG/1
2 TABLET, FILM COATED ORAL DAILY PRN
Status: DISCONTINUED | OUTPATIENT
Start: 2019-01-01 | End: 2019-01-01

## 2019-01-01 RX ORDER — SENNA AND DOCUSATE SODIUM 50; 8.6 MG/1; MG/1
2 TABLET, FILM COATED ORAL 2 TIMES DAILY
Status: DISCONTINUED | OUTPATIENT
Start: 2019-01-01 | End: 2019-01-01 | Stop reason: HOSPADM

## 2019-01-01 RX ORDER — LISINOPRIL 5 MG/1
5 TABLET ORAL DAILY
Status: DISCONTINUED | OUTPATIENT
Start: 2019-01-01 | End: 2019-01-01

## 2019-01-01 RX ORDER — SODIUM CHLORIDE 0.9 % (FLUSH) 0.9 %
10 SYRINGE (ML) INJECTION PRN
Status: DISCONTINUED | OUTPATIENT
Start: 2019-01-01 | End: 2019-01-01 | Stop reason: HOSPADM

## 2019-01-01 RX ORDER — ONDANSETRON 2 MG/ML
4 INJECTION INTRAMUSCULAR; INTRAVENOUS EVERY 6 HOURS PRN
Status: DISCONTINUED | OUTPATIENT
Start: 2019-01-01 | End: 2019-01-01 | Stop reason: HOSPADM

## 2019-01-01 RX ORDER — POTASSIUM CHLORIDE 7.45 MG/ML
10 INJECTION INTRAVENOUS PRN
Status: DISCONTINUED | OUTPATIENT
Start: 2019-01-01 | End: 2019-01-01 | Stop reason: CLARIF

## 2019-01-01 RX ORDER — SODIUM CHLORIDE 0.9 % (FLUSH) 0.9 %
10 SYRINGE (ML) INJECTION EVERY 12 HOURS SCHEDULED
Status: DISCONTINUED | OUTPATIENT
Start: 2019-01-01 | End: 2019-01-01 | Stop reason: HOSPADM

## 2019-01-01 RX ORDER — CASTOR OIL AND BALSAM, PERU 788; 87 MG/G; MG/G
OINTMENT TOPICAL 2 TIMES DAILY
Status: DISCONTINUED | OUTPATIENT
Start: 2019-01-01 | End: 2019-01-01 | Stop reason: HOSPADM

## 2019-01-01 RX ORDER — TORSEMIDE 20 MG/1
20 TABLET ORAL
Status: ON HOLD | COMMUNITY
End: 2019-01-01 | Stop reason: HOSPADM

## 2019-01-01 RX ORDER — DOCUSATE SODIUM 100 MG/1
100 CAPSULE, LIQUID FILLED ORAL DAILY
Status: DISCONTINUED | OUTPATIENT
Start: 2019-01-01 | End: 2019-01-01 | Stop reason: HOSPADM

## 2019-01-01 RX ORDER — HYDROCODONE BITARTRATE AND ACETAMINOPHEN 5; 325 MG/1; MG/1
1 TABLET ORAL ONCE
Status: COMPLETED | OUTPATIENT
Start: 2019-01-01 | End: 2019-01-01

## 2019-01-01 RX ORDER — ALBUTEROL SULFATE 90 UG/1
2 AEROSOL, METERED RESPIRATORY (INHALATION) EVERY 4 HOURS PRN
Status: DISCONTINUED | OUTPATIENT
Start: 2019-01-01 | End: 2019-01-01 | Stop reason: HOSPADM

## 2019-01-01 RX ORDER — FUROSEMIDE 10 MG/ML
40 INJECTION INTRAMUSCULAR; INTRAVENOUS 2 TIMES DAILY
Status: DISCONTINUED | OUTPATIENT
Start: 2019-01-01 | End: 2019-01-01

## 2019-01-01 RX ORDER — GUAIFENESIN 600 MG/1
600 TABLET, EXTENDED RELEASE ORAL 2 TIMES DAILY PRN
Status: DISCONTINUED | OUTPATIENT
Start: 2019-01-01 | End: 2019-01-01 | Stop reason: HOSPADM

## 2019-01-01 RX ORDER — SODIUM POLYSTYRENE SULFONATE 4.1 MEQ/G
15 POWDER, FOR SUSPENSION ORAL; RECTAL ONCE
Status: COMPLETED | OUTPATIENT
Start: 2019-01-01 | End: 2019-01-01

## 2019-01-01 RX ORDER — LACTOBACILLUS RHAMNOSUS GG 10B CELL
1 CAPSULE ORAL 2 TIMES DAILY WITH MEALS
Status: DISCONTINUED | OUTPATIENT
Start: 2019-01-01 | End: 2019-01-01 | Stop reason: HOSPADM

## 2019-01-01 RX ORDER — CIPROFLOXACIN 500 MG/1
500 TABLET, FILM COATED ORAL 2 TIMES DAILY
Qty: 10 TABLET | Refills: 0 | Status: SHIPPED | OUTPATIENT
Start: 2019-01-01 | End: 2019-05-19

## 2019-01-01 RX ORDER — ALBUTEROL SULFATE 90 UG/1
2 AEROSOL, METERED RESPIRATORY (INHALATION)
Status: DISCONTINUED | OUTPATIENT
Start: 2019-01-01 | End: 2019-01-01 | Stop reason: HOSPADM

## 2019-01-01 RX ORDER — TORSEMIDE 20 MG/1
40 TABLET ORAL DAILY
Qty: 60 TABLET | Refills: 3 | Status: SHIPPED | OUTPATIENT
Start: 2019-01-01 | End: 2019-01-01

## 2019-01-01 RX ORDER — SODIUM CHLORIDE FOR INHALATION 3 %
15 VIAL, NEBULIZER (ML) INHALATION 3 TIMES DAILY
Status: DISCONTINUED | OUTPATIENT
Start: 2019-01-01 | End: 2019-01-01 | Stop reason: HOSPADM

## 2019-01-01 RX ORDER — MIDODRINE HYDROCHLORIDE 5 MG/1
5 TABLET ORAL
Status: DISCONTINUED | OUTPATIENT
Start: 2019-01-01 | End: 2019-01-01

## 2019-01-01 RX ORDER — HYDROCODONE BITARTRATE AND ACETAMINOPHEN 5; 325 MG/1; MG/1
1 TABLET ORAL EVERY 8 HOURS PRN
Status: DISCONTINUED | OUTPATIENT
Start: 2019-01-01 | End: 2019-01-01 | Stop reason: HOSPADM

## 2019-01-01 RX ORDER — POLYETHYLENE GLYCOL 3350 17 G/17G
17 POWDER, FOR SOLUTION ORAL DAILY
Status: DISCONTINUED | OUTPATIENT
Start: 2019-01-01 | End: 2019-01-01

## 2019-01-01 RX ORDER — PANTOPRAZOLE SODIUM 40 MG/1
40 TABLET, DELAYED RELEASE ORAL
Status: DISCONTINUED | OUTPATIENT
Start: 2019-01-01 | End: 2019-01-01 | Stop reason: HOSPADM

## 2019-01-01 RX ORDER — FUROSEMIDE 10 MG/ML
20 INJECTION INTRAMUSCULAR; INTRAVENOUS ONCE
Status: COMPLETED | OUTPATIENT
Start: 2019-01-01 | End: 2019-01-01

## 2019-01-01 RX ORDER — IPRATROPIUM BROMIDE AND ALBUTEROL SULFATE 2.5; .5 MG/3ML; MG/3ML
1 SOLUTION RESPIRATORY (INHALATION)
Status: DISCONTINUED | OUTPATIENT
Start: 2019-01-01 | End: 2019-01-01 | Stop reason: HOSPADM

## 2019-01-01 RX ORDER — MIDODRINE HYDROCHLORIDE 5 MG/1
5 TABLET ORAL ONCE
Status: COMPLETED | OUTPATIENT
Start: 2019-01-01 | End: 2019-01-01

## 2019-01-01 RX ORDER — TORSEMIDE 20 MG/1
20 TABLET ORAL ONCE
Status: COMPLETED | OUTPATIENT
Start: 2019-01-01 | End: 2019-01-01

## 2019-01-01 RX ORDER — CIPROFLOXACIN 2 MG/ML
400 INJECTION, SOLUTION INTRAVENOUS EVERY 24 HOURS
Status: DISCONTINUED | OUTPATIENT
Start: 2019-01-01 | End: 2019-01-01 | Stop reason: HOSPADM

## 2019-01-01 RX ORDER — DIGOXIN 0.25 MG/ML
250 INJECTION INTRAMUSCULAR; INTRAVENOUS ONCE
Status: DISCONTINUED | OUTPATIENT
Start: 2019-01-01 | End: 2019-01-01 | Stop reason: ALTCHOICE

## 2019-01-01 RX ORDER — LEVOTHYROXINE SODIUM 0.1 MG/1
100 TABLET ORAL DAILY
Status: DISCONTINUED | OUTPATIENT
Start: 2019-01-01 | End: 2019-01-01 | Stop reason: HOSPADM

## 2019-01-01 RX ORDER — LISINOPRIL 5 MG/1
5 TABLET ORAL DAILY
Status: DISCONTINUED | OUTPATIENT
Start: 2019-01-01 | End: 2019-01-01 | Stop reason: HOSPADM

## 2019-01-01 RX ORDER — CALCIUM CARBONATE/VITAMIN D3 250-3.125
1 TABLET ORAL DAILY
Status: DISCONTINUED | OUTPATIENT
Start: 2019-01-01 | End: 2019-01-01 | Stop reason: HOSPADM

## 2019-01-01 RX ORDER — TORSEMIDE 20 MG/1
20 TABLET ORAL DAILY
Qty: 45 TABLET | Refills: 3 | Status: SHIPPED | OUTPATIENT
Start: 2019-01-01 | End: 2019-01-01

## 2019-01-01 RX ORDER — OSELTAMIVIR PHOSPHATE 30 MG/1
30 CAPSULE ORAL 2 TIMES DAILY
Status: DISCONTINUED | OUTPATIENT
Start: 2019-01-01 | End: 2019-01-01 | Stop reason: HOSPADM

## 2019-01-01 RX ORDER — MAGNESIUM SULFATE 1 G/100ML
1 INJECTION INTRAVENOUS PRN
Status: DISCONTINUED | OUTPATIENT
Start: 2019-01-01 | End: 2019-01-01 | Stop reason: CLARIF

## 2019-01-01 RX ORDER — MIDODRINE HYDROCHLORIDE 5 MG/1
5 TABLET ORAL
Status: DISCONTINUED | OUTPATIENT
Start: 2019-01-01 | End: 2019-01-01 | Stop reason: HOSPADM

## 2019-01-01 RX ORDER — HEPARIN SODIUM 5000 [USP'U]/ML
5000 INJECTION, SOLUTION INTRAVENOUS; SUBCUTANEOUS 2 TIMES DAILY
Status: DISCONTINUED | OUTPATIENT
Start: 2019-01-01 | End: 2019-01-01 | Stop reason: HOSPADM

## 2019-01-01 RX ORDER — TORSEMIDE 20 MG/1
40 TABLET ORAL DAILY
Status: DISCONTINUED | OUTPATIENT
Start: 2019-01-01 | End: 2019-01-01 | Stop reason: HOSPADM

## 2019-01-01 RX ORDER — TORSEMIDE 20 MG/1
40 TABLET ORAL
Status: DISCONTINUED | OUTPATIENT
Start: 2019-01-01 | End: 2019-01-01

## 2019-01-01 RX ORDER — TORSEMIDE 20 MG/1
40 TABLET ORAL
Status: ON HOLD | COMMUNITY
End: 2019-01-01 | Stop reason: HOSPADM

## 2019-01-01 RX ORDER — AMIODARONE HYDROCHLORIDE 200 MG/1
200 TABLET ORAL 2 TIMES DAILY
Status: DISCONTINUED | OUTPATIENT
Start: 2019-01-01 | End: 2019-01-01 | Stop reason: HOSPADM

## 2019-01-01 RX ORDER — LISINOPRIL 5 MG/1
5 TABLET ORAL DAILY
Qty: 30 TABLET | Refills: 3 | Status: ON HOLD | OUTPATIENT
Start: 2019-01-01 | End: 2019-01-01 | Stop reason: HOSPADM

## 2019-01-01 RX ORDER — TORSEMIDE 20 MG/1
20 TABLET ORAL
Status: DISCONTINUED | OUTPATIENT
Start: 2019-01-01 | End: 2019-01-01

## 2019-01-01 RX ORDER — HYDROCODONE BITARTRATE AND ACETAMINOPHEN 5; 325 MG/1; MG/1
1 TABLET ORAL EVERY 4 HOURS PRN
Status: DISCONTINUED | OUTPATIENT
Start: 2019-01-01 | End: 2019-01-01 | Stop reason: HOSPADM

## 2019-01-01 RX ORDER — TORSEMIDE 20 MG/1
20 TABLET ORAL DAILY
Qty: 45 TABLET | Refills: 3 | Status: SHIPPED | OUTPATIENT
Start: 2019-01-01 | End: 2019-01-01 | Stop reason: SDUPTHER

## 2019-01-01 RX ORDER — OSELTAMIVIR PHOSPHATE 30 MG/1
30 CAPSULE ORAL 2 TIMES DAILY
Qty: 10 CAPSULE | Refills: 0 | Status: SHIPPED | OUTPATIENT
Start: 2019-01-01 | End: 2019-01-01

## 2019-01-01 RX ORDER — MAGNESIUM SULFATE IN WATER 40 MG/ML
2 INJECTION, SOLUTION INTRAVENOUS ONCE
Status: COMPLETED | OUTPATIENT
Start: 2019-01-01 | End: 2019-01-01

## 2019-01-01 RX ORDER — LISINOPRIL 5 MG/1
5 TABLET ORAL DAILY
Qty: 30 TABLET | Refills: 3 | Status: SHIPPED | OUTPATIENT
Start: 2019-01-01 | End: 2019-01-01 | Stop reason: SDUPTHER

## 2019-01-01 RX ORDER — POTASSIUM CHLORIDE 20 MEQ/1
40 TABLET, EXTENDED RELEASE ORAL PRN
Status: DISCONTINUED | OUTPATIENT
Start: 2019-01-01 | End: 2019-01-01 | Stop reason: CLARIF

## 2019-01-01 RX ORDER — ACETAMINOPHEN 325 MG/1
650 TABLET ORAL EVERY 4 HOURS PRN
Status: DISCONTINUED | OUTPATIENT
Start: 2019-01-01 | End: 2019-01-01 | Stop reason: HOSPADM

## 2019-01-01 RX ORDER — 0.9 % SODIUM CHLORIDE 0.9 %
1000 INTRAVENOUS SOLUTION INTRAVENOUS ONCE
Status: DISCONTINUED | OUTPATIENT
Start: 2019-01-01 | End: 2019-01-01

## 2019-01-01 RX ORDER — SODIUM CHLORIDE 9 MG/ML
INJECTION, SOLUTION INTRAVENOUS CONTINUOUS
Status: DISCONTINUED | OUTPATIENT
Start: 2019-01-01 | End: 2019-01-01

## 2019-01-01 RX ADMIN — Medication 10 ML: at 09:06

## 2019-01-01 RX ADMIN — HYDROCODONE BITARTRATE AND ACETAMINOPHEN 1 TABLET: 5; 325 TABLET ORAL at 11:36

## 2019-01-01 RX ADMIN — IPRATROPIUM BROMIDE AND ALBUTEROL SULFATE 1 AMPULE: .5; 3 SOLUTION RESPIRATORY (INHALATION) at 20:45

## 2019-01-01 RX ADMIN — LEVOTHYROXINE SODIUM 100 MCG: 100 TABLET ORAL at 05:00

## 2019-01-01 RX ADMIN — MOMETASONE FUROATE AND FORMOTEROL FUMARATE DIHYDRATE 2 PUFF: 100; 5 AEROSOL RESPIRATORY (INHALATION) at 20:16

## 2019-01-01 RX ADMIN — Medication 1 CAPSULE: at 17:21

## 2019-01-01 RX ADMIN — SENNOSIDES AND DOCUSATE SODIUM 2 TABLET: 8.6; 5 TABLET ORAL at 21:28

## 2019-01-01 RX ADMIN — MOMETASONE FUROATE AND FORMOTEROL FUMARATE DIHYDRATE 2 PUFF: 100; 5 AEROSOL RESPIRATORY (INHALATION) at 20:09

## 2019-01-01 RX ADMIN — LEVOTHYROXINE SODIUM 100 MCG: 100 TABLET ORAL at 07:40

## 2019-01-01 RX ADMIN — MIDODRINE HYDROCHLORIDE 5 MG: 5 TABLET ORAL at 11:51

## 2019-01-01 RX ADMIN — DOCUSATE SODIUM 100 MG: 100 CAPSULE, LIQUID FILLED ORAL at 08:32

## 2019-01-01 RX ADMIN — TIOTROPIUM BROMIDE 18 MCG: 18 CAPSULE ORAL; RESPIRATORY (INHALATION) at 09:05

## 2019-01-01 RX ADMIN — ACETAMINOPHEN 650 MG: 325 TABLET ORAL at 03:57

## 2019-01-01 RX ADMIN — VITAMIN D, TAB 1000IU (100/BT) 1000 UNITS: 25 TAB at 09:18

## 2019-01-01 RX ADMIN — ACETAMINOPHEN 650 MG: 325 TABLET, FILM COATED ORAL at 02:16

## 2019-01-01 RX ADMIN — CALCIUM CARBONATE-CHOLECALCIFEROL TAB 250 MG-125 UNIT 250 MG: 250-125 TAB at 08:32

## 2019-01-01 RX ADMIN — IPRATROPIUM BROMIDE AND ALBUTEROL SULFATE 1 AMPULE: .5; 3 SOLUTION RESPIRATORY (INHALATION) at 11:53

## 2019-01-01 RX ADMIN — MOMETASONE FUROATE AND FORMOTEROL FUMARATE DIHYDRATE 2 PUFF: 100; 5 AEROSOL RESPIRATORY (INHALATION) at 19:51

## 2019-01-01 RX ADMIN — DOCUSATE SODIUM 100 MG: 100 CAPSULE, LIQUID FILLED ORAL at 08:53

## 2019-01-01 RX ADMIN — MOMETASONE FUROATE AND FORMOTEROL FUMARATE DIHYDRATE 2 PUFF: 100; 5 AEROSOL RESPIRATORY (INHALATION) at 09:07

## 2019-01-01 RX ADMIN — MIDODRINE HYDROCHLORIDE 5 MG: 5 TABLET ORAL at 13:14

## 2019-01-01 RX ADMIN — MIDODRINE HYDROCHLORIDE 5 MG: 5 TABLET ORAL at 14:23

## 2019-01-01 RX ADMIN — MIDODRINE HYDROCHLORIDE 5 MG: 5 TABLET ORAL at 18:24

## 2019-01-01 RX ADMIN — FUROSEMIDE 40 MG: 10 INJECTION, SOLUTION INTRAMUSCULAR; INTRAVENOUS at 17:56

## 2019-01-01 RX ADMIN — TIOTROPIUM BROMIDE 18 MCG: 18 CAPSULE ORAL; RESPIRATORY (INHALATION) at 08:56

## 2019-01-01 RX ADMIN — HEPARIN SODIUM 5000 UNITS: 5000 INJECTION INTRAVENOUS; SUBCUTANEOUS at 09:06

## 2019-01-01 RX ADMIN — SODIUM CHLORIDE SOLN NEBU 3% 15 ML: 3 NEBU SOLN at 07:34

## 2019-01-01 RX ADMIN — HYDROCODONE BITARTRATE AND ACETAMINOPHEN 1 TABLET: 5; 325 TABLET ORAL at 13:17

## 2019-01-01 RX ADMIN — TORSEMIDE 40 MG: 20 TABLET ORAL at 09:18

## 2019-01-01 RX ADMIN — HYDROCODONE BITARTRATE AND ACETAMINOPHEN 1 TABLET: 5; 325 TABLET ORAL at 13:13

## 2019-01-01 RX ADMIN — ACETAMINOPHEN 650 MG: 325 TABLET, FILM COATED ORAL at 09:38

## 2019-01-01 RX ADMIN — SENNOSIDES AND DOCUSATE SODIUM 2 TABLET: 8.6; 5 TABLET ORAL at 20:39

## 2019-01-01 RX ADMIN — MIDODRINE HYDROCHLORIDE 5 MG: 5 TABLET ORAL at 10:37

## 2019-01-01 RX ADMIN — OSELTAMIVIR PHOSPHATE 30 MG: 30 CAPSULE ORAL at 09:27

## 2019-01-01 RX ADMIN — MOMETASONE FUROATE AND FORMOTEROL FUMARATE DIHYDRATE 2 PUFF: 100; 5 AEROSOL RESPIRATORY (INHALATION) at 08:44

## 2019-01-01 RX ADMIN — SENNOSIDES AND DOCUSATE SODIUM 2 TABLET: 8.6; 5 TABLET ORAL at 09:05

## 2019-01-01 RX ADMIN — SODIUM CHLORIDE SOLN NEBU 3% 4 ML: 3 NEBU SOLN at 20:45

## 2019-01-01 RX ADMIN — PANTOPRAZOLE SODIUM 40 MG: 40 TABLET, DELAYED RELEASE ORAL at 06:25

## 2019-01-01 RX ADMIN — MIDODRINE HYDROCHLORIDE 5 MG: 5 TABLET ORAL at 09:05

## 2019-01-01 RX ADMIN — MIDODRINE HYDROCHLORIDE 5 MG: 5 TABLET ORAL at 11:36

## 2019-01-01 RX ADMIN — HYDROCODONE BITARTRATE AND ACETAMINOPHEN 1 TABLET: 5; 325 TABLET ORAL at 09:18

## 2019-01-01 RX ADMIN — LISINOPRIL 5 MG: 5 TABLET ORAL at 09:18

## 2019-01-01 RX ADMIN — HYDROCODONE BITARTRATE AND ACETAMINOPHEN 1 TABLET: 5; 325 TABLET ORAL at 02:56

## 2019-01-01 RX ADMIN — SODIUM CHLORIDE SOLN NEBU 3% 15 ML: 3 NEBU SOLN at 14:22

## 2019-01-01 RX ADMIN — DOCUSATE SODIUM 100 MG: 100 CAPSULE, LIQUID FILLED ORAL at 08:49

## 2019-01-01 RX ADMIN — HYDROCODONE BITARTRATE AND ACETAMINOPHEN 1 TABLET: 5; 325 TABLET ORAL at 09:52

## 2019-01-01 RX ADMIN — LEVOTHYROXINE SODIUM 100 MCG: 100 TABLET ORAL at 06:25

## 2019-01-01 RX ADMIN — HYDROCODONE BITARTRATE AND ACETAMINOPHEN 1 TABLET: 5; 325 TABLET ORAL at 00:29

## 2019-01-01 RX ADMIN — SODIUM CHLORIDE SOLN NEBU 3% 15 ML: 3 NEBU SOLN at 11:53

## 2019-01-01 RX ADMIN — DOCUSATE SODIUM 100 MG: 100 CAPSULE, LIQUID FILLED ORAL at 09:05

## 2019-01-01 RX ADMIN — MIDODRINE HYDROCHLORIDE 5 MG: 5 TABLET ORAL at 12:09

## 2019-01-01 RX ADMIN — MOMETASONE FUROATE AND FORMOTEROL FUMARATE DIHYDRATE 2 PUFF: 100; 5 AEROSOL RESPIRATORY (INHALATION) at 20:11

## 2019-01-01 RX ADMIN — HYDROCODONE BITARTRATE AND ACETAMINOPHEN 1 TABLET: 5; 325 TABLET ORAL at 15:39

## 2019-01-01 RX ADMIN — IPRATROPIUM BROMIDE AND ALBUTEROL SULFATE 1 AMPULE: .5; 3 SOLUTION RESPIRATORY (INHALATION) at 11:50

## 2019-01-01 RX ADMIN — MAGNESIUM SULFATE HEPTAHYDRATE 2 G: 40 INJECTION, SOLUTION INTRAVENOUS at 14:18

## 2019-01-01 RX ADMIN — HYDROCODONE BITARTRATE AND ACETAMINOPHEN 1 TABLET: 5; 325 TABLET ORAL at 13:14

## 2019-01-01 RX ADMIN — SODIUM POLYSTYRENE SULFONATE 15 G: 1 POWDER ORAL; RECTAL at 01:34

## 2019-01-01 RX ADMIN — CALCIUM CARBONATE-CHOLECALCIFEROL TAB 250 MG-125 UNIT 250 MG: 250-125 TAB at 07:40

## 2019-01-01 RX ADMIN — LISINOPRIL 5 MG: 5 TABLET ORAL at 17:37

## 2019-01-01 RX ADMIN — DOCUSATE SODIUM 100 MG: 100 CAPSULE, LIQUID FILLED ORAL at 09:20

## 2019-01-01 RX ADMIN — MOMETASONE FUROATE AND FORMOTEROL FUMARATE DIHYDRATE 2 PUFF: 100; 5 AEROSOL RESPIRATORY (INHALATION) at 08:56

## 2019-01-01 RX ADMIN — MOMETASONE FUROATE AND FORMOTEROL FUMARATE DIHYDRATE 2 PUFF: 100; 5 AEROSOL RESPIRATORY (INHALATION) at 08:50

## 2019-01-01 RX ADMIN — Medication 1 CAPSULE: at 09:06

## 2019-01-01 RX ADMIN — CALCIUM CARBONATE-CHOLECALCIFEROL TAB 250 MG-125 UNIT 250 MG: 250-125 TAB at 08:53

## 2019-01-01 RX ADMIN — PANTOPRAZOLE SODIUM 40 MG: 40 TABLET, DELAYED RELEASE ORAL at 05:58

## 2019-01-01 RX ADMIN — SENNOSIDES AND DOCUSATE SODIUM 2 TABLET: 8.6; 5 TABLET ORAL at 21:10

## 2019-01-01 RX ADMIN — HYDROCODONE BITARTRATE AND ACETAMINOPHEN 1 TABLET: 5; 325 TABLET ORAL at 16:39

## 2019-01-01 RX ADMIN — MIDODRINE HYDROCHLORIDE 5 MG: 5 TABLET ORAL at 09:14

## 2019-01-01 RX ADMIN — MIDODRINE HYDROCHLORIDE 5 MG: 5 TABLET ORAL at 17:56

## 2019-01-01 RX ADMIN — MIDODRINE HYDROCHLORIDE 5 MG: 5 TABLET ORAL at 21:48

## 2019-01-01 RX ADMIN — MIDODRINE HYDROCHLORIDE 5 MG: 5 TABLET ORAL at 09:20

## 2019-01-01 RX ADMIN — MOMETASONE FUROATE AND FORMOTEROL FUMARATE DIHYDRATE 2 PUFF: 100; 5 AEROSOL RESPIRATORY (INHALATION) at 09:05

## 2019-01-01 RX ADMIN — HYDROCODONE BITARTRATE AND ACETAMINOPHEN 1 TABLET: 5; 325 TABLET ORAL at 00:02

## 2019-01-01 RX ADMIN — Medication 10 ML: at 21:44

## 2019-01-01 RX ADMIN — ENOXAPARIN SODIUM 30 MG: 30 INJECTION SUBCUTANEOUS at 20:30

## 2019-01-01 RX ADMIN — ENOXAPARIN SODIUM 30 MG: 30 INJECTION SUBCUTANEOUS at 21:44

## 2019-01-01 RX ADMIN — SODIUM CHLORIDE: 9 INJECTION, SOLUTION INTRAVENOUS at 01:33

## 2019-01-01 RX ADMIN — CASTOR OIL AND BALSAM, PERU: 788; 87 OINTMENT TOPICAL at 09:37

## 2019-01-01 RX ADMIN — HYDROCODONE BITARTRATE AND ACETAMINOPHEN 1 TABLET: 5; 325 TABLET ORAL at 09:20

## 2019-01-01 RX ADMIN — HYDROCODONE BITARTRATE AND ACETAMINOPHEN 1 TABLET: 5; 325 TABLET ORAL at 15:47

## 2019-01-01 RX ADMIN — MIDODRINE HYDROCHLORIDE 5 MG: 5 TABLET ORAL at 09:25

## 2019-01-01 RX ADMIN — CALCIUM CARBONATE-CHOLECALCIFEROL TAB 250 MG-125 UNIT 250 MG: 250-125 TAB at 10:37

## 2019-01-01 RX ADMIN — PANTOPRAZOLE SODIUM 40 MG: 40 TABLET, DELAYED RELEASE ORAL at 05:00

## 2019-01-01 RX ADMIN — TIOTROPIUM BROMIDE 18 MCG: 18 CAPSULE ORAL; RESPIRATORY (INHALATION) at 08:17

## 2019-01-01 RX ADMIN — ACETAMINOPHEN 650 MG: 325 TABLET ORAL at 04:11

## 2019-01-01 RX ADMIN — DOCUSATE SODIUM 100 MG: 100 CAPSULE, LIQUID FILLED ORAL at 09:28

## 2019-01-01 RX ADMIN — LEVOTHYROXINE SODIUM 100 MCG: 100 TABLET ORAL at 06:49

## 2019-01-01 RX ADMIN — FUROSEMIDE 40 MG: 10 INJECTION, SOLUTION INTRAMUSCULAR; INTRAVENOUS at 17:06

## 2019-01-01 RX ADMIN — LISINOPRIL 5 MG: 5 TABLET ORAL at 18:32

## 2019-01-01 RX ADMIN — MIDODRINE HYDROCHLORIDE 5 MG: 5 TABLET ORAL at 16:39

## 2019-01-01 RX ADMIN — MIDODRINE HYDROCHLORIDE 5 MG: 5 TABLET ORAL at 17:06

## 2019-01-01 RX ADMIN — MIDODRINE HYDROCHLORIDE 5 MG: 5 TABLET ORAL at 12:49

## 2019-01-01 RX ADMIN — OSELTAMIVIR PHOSPHATE 30 MG: 30 CAPSULE ORAL at 09:18

## 2019-01-01 RX ADMIN — MIDODRINE HYDROCHLORIDE 5 MG: 5 TABLET ORAL at 13:17

## 2019-01-01 RX ADMIN — Medication 1 CAPSULE: at 17:39

## 2019-01-01 RX ADMIN — DOCUSATE SODIUM 100 MG: 100 CAPSULE, LIQUID FILLED ORAL at 10:37

## 2019-01-01 RX ADMIN — Medication 10 ML: at 21:03

## 2019-01-01 RX ADMIN — IPRATROPIUM BROMIDE AND ALBUTEROL SULFATE 1 AMPULE: .5; 3 SOLUTION RESPIRATORY (INHALATION) at 08:34

## 2019-01-01 RX ADMIN — HYDROCODONE BITARTRATE AND ACETAMINOPHEN 1 TABLET: 5; 325 TABLET ORAL at 21:03

## 2019-01-01 RX ADMIN — MOMETASONE FUROATE AND FORMOTEROL FUMARATE DIHYDRATE 2 PUFF: 100; 5 AEROSOL RESPIRATORY (INHALATION) at 07:33

## 2019-01-01 RX ADMIN — HYDROCODONE BITARTRATE AND ACETAMINOPHEN 1 TABLET: 5; 325 TABLET ORAL at 12:50

## 2019-01-01 RX ADMIN — MOMETASONE FUROATE AND FORMOTEROL FUMARATE DIHYDRATE 2 PUFF: 100; 5 AEROSOL RESPIRATORY (INHALATION) at 08:26

## 2019-01-01 RX ADMIN — VITAMIN D, TAB 1000IU (100/BT) 1000 UNITS: 25 TAB at 09:05

## 2019-01-01 RX ADMIN — HYDROCODONE BITARTRATE AND ACETAMINOPHEN 1 TABLET: 5; 325 TABLET ORAL at 18:23

## 2019-01-01 RX ADMIN — MOMETASONE FUROATE AND FORMOTEROL FUMARATE DIHYDRATE 2 PUFF: 100; 5 AEROSOL RESPIRATORY (INHALATION) at 20:07

## 2019-01-01 RX ADMIN — Medication 1 CAPSULE: at 09:18

## 2019-01-01 RX ADMIN — MOMETASONE FUROATE AND FORMOTEROL FUMARATE DIHYDRATE 2 PUFF: 100; 5 AEROSOL RESPIRATORY (INHALATION) at 08:17

## 2019-01-01 RX ADMIN — VITAMIN D, TAB 1000IU (100/BT) 1000 UNITS: 25 TAB at 10:37

## 2019-01-01 RX ADMIN — CASTOR OIL AND BALSAM, PERU: 788; 87 OINTMENT TOPICAL at 16:46

## 2019-01-01 RX ADMIN — PANTOPRAZOLE SODIUM 40 MG: 40 TABLET, DELAYED RELEASE ORAL at 06:49

## 2019-01-01 RX ADMIN — OSELTAMIVIR PHOSPHATE 30 MG: 30 CAPSULE ORAL at 21:43

## 2019-01-01 RX ADMIN — MIDODRINE HYDROCHLORIDE 5 MG: 5 TABLET ORAL at 12:00

## 2019-01-01 RX ADMIN — Medication 10 ML: at 20:21

## 2019-01-01 RX ADMIN — CEFEPIME HYDROCHLORIDE 1 G: 1 INJECTION, POWDER, FOR SOLUTION INTRAMUSCULAR; INTRAVENOUS at 01:33

## 2019-01-01 RX ADMIN — Medication 10 ML: at 16:40

## 2019-01-01 RX ADMIN — SENNOSIDES AND DOCUSATE SODIUM 2 TABLET: 8.6; 5 TABLET ORAL at 09:20

## 2019-01-01 RX ADMIN — MIDODRINE HYDROCHLORIDE 5 MG: 5 TABLET ORAL at 13:02

## 2019-01-01 RX ADMIN — CIPROFLOXACIN 400 MG: 2 INJECTION, SOLUTION INTRAVENOUS at 09:54

## 2019-01-01 RX ADMIN — MIDODRINE HYDROCHLORIDE 5 MG: 5 TABLET ORAL at 08:49

## 2019-01-01 RX ADMIN — MOMETASONE FUROATE AND FORMOTEROL FUMARATE DIHYDRATE 2 PUFF: 100; 5 AEROSOL RESPIRATORY (INHALATION) at 19:42

## 2019-01-01 RX ADMIN — Medication 10 ML: at 13:11

## 2019-01-01 RX ADMIN — IPRATROPIUM BROMIDE AND ALBUTEROL SULFATE 1 AMPULE: .5; 3 SOLUTION RESPIRATORY (INHALATION) at 12:13

## 2019-01-01 RX ADMIN — ACETAMINOPHEN 650 MG: 325 TABLET, FILM COATED ORAL at 04:56

## 2019-01-01 RX ADMIN — TIOTROPIUM BROMIDE 18 MCG: 18 CAPSULE ORAL; RESPIRATORY (INHALATION) at 08:37

## 2019-01-01 RX ADMIN — MIDODRINE HYDROCHLORIDE 5 MG: 5 TABLET ORAL at 09:28

## 2019-01-01 RX ADMIN — MOMETASONE FUROATE AND FORMOTEROL FUMARATE DIHYDRATE 2 PUFF: 100; 5 AEROSOL RESPIRATORY (INHALATION) at 08:34

## 2019-01-01 RX ADMIN — IPRATROPIUM BROMIDE AND ALBUTEROL SULFATE 1 AMPULE: .5; 3 SOLUTION RESPIRATORY (INHALATION) at 20:09

## 2019-01-01 RX ADMIN — TORSEMIDE 20 MG: 20 TABLET ORAL at 14:35

## 2019-01-01 RX ADMIN — CALCIUM CARBONATE-CHOLECALCIFEROL TAB 250 MG-125 UNIT 250 MG: 250-125 TAB at 08:49

## 2019-01-01 RX ADMIN — Medication 10 ML: at 20:38

## 2019-01-01 RX ADMIN — Medication 10 ML: at 09:19

## 2019-01-01 RX ADMIN — FUROSEMIDE 40 MG: 10 INJECTION, SOLUTION INTRAMUSCULAR; INTRAVENOUS at 10:41

## 2019-01-01 RX ADMIN — Medication 10 ML: at 21:18

## 2019-01-01 RX ADMIN — MIDODRINE HYDROCHLORIDE 5 MG: 5 TABLET ORAL at 17:21

## 2019-01-01 RX ADMIN — CASTOR OIL AND BALSAM, PERU: 788; 87 OINTMENT TOPICAL at 21:28

## 2019-01-01 RX ADMIN — VITAMIN D, TAB 1000IU (100/BT) 1000 UNITS: 25 TAB at 14:35

## 2019-01-01 RX ADMIN — MIDODRINE HYDROCHLORIDE 5 MG: 5 TABLET ORAL at 17:40

## 2019-01-01 RX ADMIN — MIDODRINE HYDROCHLORIDE 5 MG: 5 TABLET ORAL at 14:35

## 2019-01-01 RX ADMIN — AMIODARONE HYDROCHLORIDE 200 MG: 200 TABLET ORAL at 09:05

## 2019-01-01 RX ADMIN — MIDODRINE HYDROCHLORIDE 5 MG: 5 TABLET ORAL at 08:53

## 2019-01-01 RX ADMIN — HYDROCODONE BITARTRATE AND ACETAMINOPHEN 1 TABLET: 5; 325 TABLET ORAL at 02:37

## 2019-01-01 RX ADMIN — DOCUSATE SODIUM 100 MG: 100 CAPSULE, LIQUID FILLED ORAL at 07:40

## 2019-01-01 RX ADMIN — MIDODRINE HYDROCHLORIDE 5 MG: 5 TABLET ORAL at 17:37

## 2019-01-01 RX ADMIN — HYDROCODONE BITARTRATE AND ACETAMINOPHEN 1 TABLET: 5; 325 TABLET ORAL at 04:15

## 2019-01-01 RX ADMIN — VITAMIN D, TAB 1000IU (100/BT) 1000 UNITS: 25 TAB at 08:53

## 2019-01-01 RX ADMIN — LISINOPRIL 5 MG: 5 TABLET ORAL at 09:27

## 2019-01-01 RX ADMIN — HYDROCODONE BITARTRATE AND ACETAMINOPHEN 1 TABLET: 5; 325 TABLET ORAL at 09:14

## 2019-01-01 RX ADMIN — Medication 10 ML: at 21:50

## 2019-01-01 RX ADMIN — TIOTROPIUM BROMIDE 18 MCG: 18 CAPSULE ORAL; RESPIRATORY (INHALATION) at 07:58

## 2019-01-01 RX ADMIN — TIOTROPIUM BROMIDE 18 MCG: 18 CAPSULE ORAL; RESPIRATORY (INHALATION) at 08:44

## 2019-01-01 RX ADMIN — ALBUTEROL SULFATE 2 PUFF: 90 AEROSOL, METERED RESPIRATORY (INHALATION) at 08:56

## 2019-01-01 RX ADMIN — ALBUTEROL SULFATE 2 PUFF: 90 AEROSOL, METERED RESPIRATORY (INHALATION) at 20:01

## 2019-01-01 RX ADMIN — ACETAMINOPHEN 650 MG: 325 TABLET, FILM COATED ORAL at 16:33

## 2019-01-01 RX ADMIN — ALBUTEROL SULFATE 2 PUFF: 90 AEROSOL, METERED RESPIRATORY (INHALATION) at 19:07

## 2019-01-01 RX ADMIN — LISINOPRIL 5 MG: 5 TABLET ORAL at 14:35

## 2019-01-01 RX ADMIN — OSELTAMIVIR PHOSPHATE 30 MG: 30 CAPSULE ORAL at 20:38

## 2019-01-01 RX ADMIN — PANTOPRAZOLE SODIUM 40 MG: 40 TABLET, DELAYED RELEASE ORAL at 14:36

## 2019-01-01 RX ADMIN — HYDROCODONE BITARTRATE AND ACETAMINOPHEN 1 TABLET: 5; 325 TABLET ORAL at 20:57

## 2019-01-01 RX ADMIN — ENOXAPARIN SODIUM 30 MG: 30 INJECTION SUBCUTANEOUS at 21:45

## 2019-01-01 RX ADMIN — ENOXAPARIN SODIUM 30 MG: 30 INJECTION SUBCUTANEOUS at 09:20

## 2019-01-01 RX ADMIN — MOMETASONE FUROATE AND FORMOTEROL FUMARATE DIHYDRATE 2 PUFF: 100; 5 AEROSOL RESPIRATORY (INHALATION) at 19:06

## 2019-01-01 RX ADMIN — Medication 10 ML: at 22:43

## 2019-01-01 RX ADMIN — MOMETASONE FUROATE AND FORMOTEROL FUMARATE DIHYDRATE 2 PUFF: 100; 5 AEROSOL RESPIRATORY (INHALATION) at 07:34

## 2019-01-01 RX ADMIN — DOCUSATE SODIUM 100 MG: 100 CAPSULE, LIQUID FILLED ORAL at 14:35

## 2019-01-01 RX ADMIN — Medication 1 CAPSULE: at 08:53

## 2019-01-01 RX ADMIN — ENOXAPARIN SODIUM 30 MG: 30 INJECTION SUBCUTANEOUS at 07:40

## 2019-01-01 RX ADMIN — ACETAMINOPHEN 650 MG: 325 TABLET ORAL at 07:56

## 2019-01-01 RX ADMIN — CEFEPIME HYDROCHLORIDE 1 G: 1 INJECTION, POWDER, FOR SOLUTION INTRAMUSCULAR; INTRAVENOUS at 02:00

## 2019-01-01 RX ADMIN — TORSEMIDE 40 MG: 20 TABLET ORAL at 08:49

## 2019-01-01 RX ADMIN — CALCIUM CARBONATE-CHOLECALCIFEROL TAB 250 MG-125 UNIT 250 MG: 250-125 TAB at 09:06

## 2019-01-01 RX ADMIN — TORSEMIDE 40 MG: 20 TABLET ORAL at 09:27

## 2019-01-01 RX ADMIN — MOMETASONE FUROATE AND FORMOTEROL FUMARATE DIHYDRATE 2 PUFF: 100; 5 AEROSOL RESPIRATORY (INHALATION) at 07:58

## 2019-01-01 RX ADMIN — SENNOSIDES AND DOCUSATE SODIUM 2 TABLET: 8.6; 5 TABLET ORAL at 08:53

## 2019-01-01 RX ADMIN — HYDROCODONE BITARTRATE AND ACETAMINOPHEN 1 TABLET: 5; 325 TABLET ORAL at 20:21

## 2019-01-01 RX ADMIN — LEVOTHYROXINE SODIUM 100 MCG: 100 TABLET ORAL at 09:05

## 2019-01-01 RX ADMIN — FUROSEMIDE 40 MG: 10 INJECTION, SOLUTION INTRAMUSCULAR; INTRAVENOUS at 09:25

## 2019-01-01 RX ADMIN — MIDODRINE HYDROCHLORIDE 5 MG: 5 TABLET ORAL at 18:32

## 2019-01-01 RX ADMIN — MIDODRINE HYDROCHLORIDE 5 MG: 5 TABLET ORAL at 09:18

## 2019-01-01 RX ADMIN — PANTOPRAZOLE SODIUM 40 MG: 40 TABLET, DELAYED RELEASE ORAL at 09:20

## 2019-01-01 RX ADMIN — LISINOPRIL 5 MG: 5 TABLET ORAL at 09:20

## 2019-01-01 RX ADMIN — LEVOTHYROXINE SODIUM 100 MCG: 100 TABLET ORAL at 05:36

## 2019-01-01 RX ADMIN — PANTOPRAZOLE SODIUM 40 MG: 40 TABLET, DELAYED RELEASE ORAL at 05:56

## 2019-01-01 RX ADMIN — FUROSEMIDE 40 MG: 10 INJECTION, SOLUTION INTRAMUSCULAR; INTRAVENOUS at 09:21

## 2019-01-01 RX ADMIN — HYDROCODONE BITARTRATE AND ACETAMINOPHEN 1 TABLET: 5; 325 TABLET ORAL at 12:34

## 2019-01-01 RX ADMIN — FUROSEMIDE 40 MG: 10 INJECTION, SOLUTION INTRAMUSCULAR; INTRAVENOUS at 17:37

## 2019-01-01 RX ADMIN — MOMETASONE FUROATE AND FORMOTEROL FUMARATE DIHYDRATE 2 PUFF: 100; 5 AEROSOL RESPIRATORY (INHALATION) at 20:45

## 2019-01-01 RX ADMIN — MOMETASONE FUROATE AND FORMOTEROL FUMARATE DIHYDRATE 2 PUFF: 100; 5 AEROSOL RESPIRATORY (INHALATION) at 08:37

## 2019-01-01 RX ADMIN — HYDROCODONE BITARTRATE AND ACETAMINOPHEN 1 TABLET: 5; 325 TABLET ORAL at 21:17

## 2019-01-01 RX ADMIN — FUROSEMIDE 40 MG: 10 INJECTION, SOLUTION INTRAMUSCULAR; INTRAVENOUS at 13:10

## 2019-01-01 RX ADMIN — LEVOTHYROXINE SODIUM 100 MCG: 100 TABLET ORAL at 05:56

## 2019-01-01 RX ADMIN — MIDODRINE HYDROCHLORIDE 5 MG: 5 TABLET ORAL at 18:39

## 2019-01-01 RX ADMIN — SENNOSIDES AND DOCUSATE SODIUM 2 TABLET: 8.6; 5 TABLET ORAL at 17:15

## 2019-01-01 RX ADMIN — HYDROCODONE BITARTRATE AND ACETAMINOPHEN 1 TABLET: 5; 325 TABLET ORAL at 17:37

## 2019-01-01 RX ADMIN — ENOXAPARIN SODIUM 30 MG: 30 INJECTION SUBCUTANEOUS at 21:48

## 2019-01-01 RX ADMIN — FUROSEMIDE 40 MG: 10 INJECTION, SOLUTION INTRAMUSCULAR; INTRAVENOUS at 17:40

## 2019-01-01 RX ADMIN — PANTOPRAZOLE SODIUM 40 MG: 40 TABLET, DELAYED RELEASE ORAL at 05:50

## 2019-01-01 RX ADMIN — PANTOPRAZOLE SODIUM 40 MG: 40 TABLET, DELAYED RELEASE ORAL at 07:40

## 2019-01-01 RX ADMIN — MOMETASONE FUROATE AND FORMOTEROL FUMARATE DIHYDRATE 2 PUFF: 100; 5 AEROSOL RESPIRATORY (INHALATION) at 20:42

## 2019-01-01 RX ADMIN — DOCUSATE SODIUM 100 MG: 100 CAPSULE, LIQUID FILLED ORAL at 09:18

## 2019-01-01 RX ADMIN — MIDODRINE HYDROCHLORIDE 5 MG: 5 TABLET ORAL at 07:40

## 2019-01-01 RX ADMIN — MIDODRINE HYDROCHLORIDE 5 MG: 5 TABLET ORAL at 08:32

## 2019-01-01 RX ADMIN — HYDROCODONE BITARTRATE AND ACETAMINOPHEN 1 TABLET: 5; 325 TABLET ORAL at 20:09

## 2019-01-01 RX ADMIN — CEFEPIME HYDROCHLORIDE 1 G: 1 INJECTION, POWDER, FOR SOLUTION INTRAMUSCULAR; INTRAVENOUS at 01:42

## 2019-01-01 RX ADMIN — HYDROCODONE BITARTRATE AND ACETAMINOPHEN 1 TABLET: 5; 325 TABLET ORAL at 17:27

## 2019-01-01 RX ADMIN — HYDROCODONE BITARTRATE AND ACETAMINOPHEN 1 TABLET: 5; 325 TABLET ORAL at 21:45

## 2019-01-01 RX ADMIN — LEVOTHYROXINE SODIUM 100 MCG: 100 TABLET ORAL at 05:58

## 2019-01-01 RX ADMIN — Medication 1 CAPSULE: at 18:39

## 2019-01-01 RX ADMIN — IPRATROPIUM BROMIDE AND ALBUTEROL SULFATE 1 AMPULE: .5; 3 SOLUTION RESPIRATORY (INHALATION) at 07:34

## 2019-01-01 RX ADMIN — MIDODRINE HYDROCHLORIDE 5 MG: 5 TABLET ORAL at 16:36

## 2019-01-01 RX ADMIN — ACETAMINOPHEN 650 MG: 325 TABLET ORAL at 14:27

## 2019-01-01 RX ADMIN — ALBUTEROL SULFATE 2 PUFF: 90 AEROSOL, METERED RESPIRATORY (INHALATION) at 09:06

## 2019-01-01 RX ADMIN — HYDROCODONE BITARTRATE AND ACETAMINOPHEN 1 TABLET: 5; 325 TABLET ORAL at 05:28

## 2019-01-01 RX ADMIN — TORSEMIDE 20 MG: 20 TABLET ORAL at 16:36

## 2019-01-01 RX ADMIN — LEVOTHYROXINE SODIUM 100 MCG: 100 TABLET ORAL at 06:51

## 2019-01-01 RX ADMIN — VITAMIN D, TAB 1000IU (100/BT) 1000 UNITS: 25 TAB at 07:40

## 2019-01-01 RX ADMIN — CALCIUM CARBONATE-CHOLECALCIFEROL TAB 250 MG-125 UNIT 250 MG: 250-125 TAB at 14:35

## 2019-01-01 RX ADMIN — Medication 10 ML: at 22:41

## 2019-01-01 RX ADMIN — ENOXAPARIN SODIUM 30 MG: 30 INJECTION SUBCUTANEOUS at 13:18

## 2019-01-01 RX ADMIN — TORSEMIDE 40 MG: 20 TABLET ORAL at 16:44

## 2019-01-01 RX ADMIN — LISINOPRIL 5 MG: 5 TABLET ORAL at 17:06

## 2019-01-01 RX ADMIN — IPRATROPIUM BROMIDE AND ALBUTEROL SULFATE 1 AMPULE: .5; 3 SOLUTION RESPIRATORY (INHALATION) at 16:55

## 2019-01-01 RX ADMIN — SODIUM CHLORIDE SOLN NEBU 3% 15 ML: 3 NEBU SOLN at 12:13

## 2019-01-01 RX ADMIN — HYDROCODONE BITARTRATE AND ACETAMINOPHEN 1 TABLET: 5; 325 TABLET ORAL at 02:39

## 2019-01-01 RX ADMIN — PANTOPRAZOLE SODIUM 40 MG: 40 TABLET, DELAYED RELEASE ORAL at 09:05

## 2019-01-01 RX ADMIN — MOMETASONE FUROATE AND FORMOTEROL FUMARATE DIHYDRATE 2 PUFF: 100; 5 AEROSOL RESPIRATORY (INHALATION) at 19:57

## 2019-01-01 RX ADMIN — ALBUTEROL SULFATE 2 PUFF: 90 AEROSOL, METERED RESPIRATORY (INHALATION) at 08:51

## 2019-01-01 RX ADMIN — PANTOPRAZOLE SODIUM 40 MG: 40 TABLET, DELAYED RELEASE ORAL at 05:36

## 2019-01-01 RX ADMIN — HYDROCODONE BITARTRATE AND ACETAMINOPHEN 1 TABLET: 5; 325 TABLET ORAL at 06:49

## 2019-01-01 RX ADMIN — LISINOPRIL 5 MG: 5 TABLET ORAL at 09:25

## 2019-01-01 RX ADMIN — LEVOTHYROXINE SODIUM 100 MCG: 100 TABLET ORAL at 09:19

## 2019-01-01 RX ADMIN — TORSEMIDE 20 MG: 20 TABLET ORAL at 10:37

## 2019-01-01 RX ADMIN — HYDROCODONE BITARTRATE AND ACETAMINOPHEN 1 TABLET: 5; 325 TABLET ORAL at 17:22

## 2019-01-01 RX ADMIN — MIDODRINE HYDROCHLORIDE 5 MG: 5 TABLET ORAL at 13:03

## 2019-01-01 RX ADMIN — TIOTROPIUM BROMIDE 18 MCG: 18 CAPSULE ORAL; RESPIRATORY (INHALATION) at 08:51

## 2019-01-01 RX ADMIN — Medication 10 ML: at 07:49

## 2019-01-01 RX ADMIN — HYDROCODONE BITARTRATE AND ACETAMINOPHEN 1 TABLET: 5; 325 TABLET ORAL at 21:43

## 2019-01-01 RX ADMIN — MOMETASONE FUROATE AND FORMOTEROL FUMARATE DIHYDRATE 2 PUFF: 100; 5 AEROSOL RESPIRATORY (INHALATION) at 20:34

## 2019-01-01 RX ADMIN — ENOXAPARIN SODIUM 30 MG: 30 INJECTION SUBCUTANEOUS at 10:39

## 2019-01-01 RX ADMIN — TIOTROPIUM BROMIDE 18 MCG: 18 CAPSULE ORAL; RESPIRATORY (INHALATION) at 07:33

## 2019-01-01 RX ADMIN — VITAMIN D, TAB 1000IU (100/BT) 1000 UNITS: 25 TAB at 08:32

## 2019-01-01 RX ADMIN — MIDODRINE HYDROCHLORIDE 5 MG: 5 TABLET ORAL at 12:50

## 2019-01-01 RX ADMIN — LEVOTHYROXINE SODIUM 100 MCG: 100 TABLET ORAL at 05:50

## 2019-01-01 RX ADMIN — ENOXAPARIN SODIUM 30 MG: 30 INJECTION SUBCUTANEOUS at 08:49

## 2019-01-01 RX ADMIN — LISINOPRIL 5 MG: 5 TABLET ORAL at 09:06

## 2019-01-01 RX ADMIN — FUROSEMIDE 40 MG: 10 INJECTION, SOLUTION INTRAMUSCULAR; INTRAVENOUS at 09:06

## 2019-01-01 RX ADMIN — Medication 10 ML: at 09:25

## 2019-01-01 RX ADMIN — IPRATROPIUM BROMIDE AND ALBUTEROL SULFATE 1 AMPULE: .5; 3 SOLUTION RESPIRATORY (INHALATION) at 08:26

## 2019-01-01 RX ADMIN — Medication 10 ML: at 21:28

## 2019-01-01 RX ADMIN — Medication 10 ML: at 20:40

## 2019-01-01 RX ADMIN — Medication 10 ML: at 13:20

## 2019-01-01 RX ADMIN — Medication 10 ML: at 20:10

## 2019-01-01 RX ADMIN — HYDROCODONE BITARTRATE AND ACETAMINOPHEN 1 TABLET: 5; 325 TABLET ORAL at 22:00

## 2019-01-01 RX ADMIN — VITAMIN D, TAB 1000IU (100/BT) 1000 UNITS: 25 TAB at 08:49

## 2019-01-01 RX ADMIN — MIDODRINE HYDROCHLORIDE 5 MG: 5 TABLET ORAL at 13:10

## 2019-01-01 RX ADMIN — ENOXAPARIN SODIUM 30 MG: 30 INJECTION SUBCUTANEOUS at 20:38

## 2019-01-01 RX ADMIN — TORSEMIDE 40 MG: 20 TABLET ORAL at 07:40

## 2019-01-01 RX ADMIN — PANTOPRAZOLE SODIUM 40 MG: 40 TABLET, DELAYED RELEASE ORAL at 06:51

## 2019-01-01 RX ADMIN — TORSEMIDE 20 MG: 20 TABLET ORAL at 08:32

## 2019-01-01 RX ADMIN — MOMETASONE FUROATE AND FORMOTEROL FUMARATE DIHYDRATE 2 PUFF: 100; 5 AEROSOL RESPIRATORY (INHALATION) at 20:01

## 2019-01-01 RX ADMIN — ALBUTEROL SULFATE 2 PUFF: 90 AEROSOL, METERED RESPIRATORY (INHALATION) at 19:51

## 2019-01-01 RX ADMIN — MIDODRINE HYDROCHLORIDE 5 MG: 5 TABLET ORAL at 17:39

## 2019-01-01 RX ADMIN — HYDROCODONE BITARTRATE AND ACETAMINOPHEN 1 TABLET: 5; 325 TABLET ORAL at 06:51

## 2019-01-01 RX ADMIN — ENOXAPARIN SODIUM 30 MG: 30 INJECTION SUBCUTANEOUS at 08:53

## 2019-01-01 RX ADMIN — FUROSEMIDE 20 MG: 10 INJECTION, SOLUTION INTRAMUSCULAR; INTRAVENOUS at 18:27

## 2019-01-01 RX ADMIN — TIOTROPIUM BROMIDE 18 MCG: 18 CAPSULE ORAL; RESPIRATORY (INHALATION) at 09:07

## 2019-01-01 RX ADMIN — ALBUTEROL SULFATE 2 PUFF: 90 AEROSOL, METERED RESPIRATORY (INHALATION) at 19:42

## 2019-01-01 ASSESSMENT — PAIN SCALES - GENERAL
PAINLEVEL_OUTOF10: 10
PAINLEVEL_OUTOF10: 4
PAINLEVEL_OUTOF10: 4
PAINLEVEL_OUTOF10: 7
PAINLEVEL_OUTOF10: 0
PAINLEVEL_OUTOF10: 10
PAINLEVEL_OUTOF10: 7
PAINLEVEL_OUTOF10: 7
PAINLEVEL_OUTOF10: 0
PAINLEVEL_OUTOF10: 5
PAINLEVEL_OUTOF10: 10
PAINLEVEL_OUTOF10: 7
PAINLEVEL_OUTOF10: 7
PAINLEVEL_OUTOF10: 6
PAINLEVEL_OUTOF10: 4
PAINLEVEL_OUTOF10: 7
PAINLEVEL_OUTOF10: 2
PAINLEVEL_OUTOF10: 10
PAINLEVEL_OUTOF10: 10
PAINLEVEL_OUTOF10: 8
PAINLEVEL_OUTOF10: 4
PAINLEVEL_OUTOF10: 0
PAINLEVEL_OUTOF10: 5
PAINLEVEL_OUTOF10: 0
PAINLEVEL_OUTOF10: 8
PAINLEVEL_OUTOF10: 5
PAINLEVEL_OUTOF10: 4
PAINLEVEL_OUTOF10: 0
PAINLEVEL_OUTOF10: 0
PAINLEVEL_OUTOF10: 1
PAINLEVEL_OUTOF10: 9
PAINLEVEL_OUTOF10: 0
PAINLEVEL_OUTOF10: 10
PAINLEVEL_OUTOF10: 0
PAINLEVEL_OUTOF10: 6
PAINLEVEL_OUTOF10: 0
PAINLEVEL_OUTOF10: 8
PAINLEVEL_OUTOF10: 6
PAINLEVEL_OUTOF10: 4
PAINLEVEL_OUTOF10: 9
PAINLEVEL_OUTOF10: 1
PAINLEVEL_OUTOF10: 7
PAINLEVEL_OUTOF10: 0
PAINLEVEL_OUTOF10: 0
PAINLEVEL_OUTOF10: 8
PAINLEVEL_OUTOF10: 9
PAINLEVEL_OUTOF10: 9
PAINLEVEL_OUTOF10: 7
PAINLEVEL_OUTOF10: 8
PAINLEVEL_OUTOF10: 9
PAINLEVEL_OUTOF10: 7
PAINLEVEL_OUTOF10: 7
PAINLEVEL_OUTOF10: 2
PAINLEVEL_OUTOF10: 8
PAINLEVEL_OUTOF10: 0
PAINLEVEL_OUTOF10: 1
PAINLEVEL_OUTOF10: 10
PAINLEVEL_OUTOF10: 9
PAINLEVEL_OUTOF10: 8
PAINLEVEL_OUTOF10: 8
PAINLEVEL_OUTOF10: 0
PAINLEVEL_OUTOF10: 4
PAINLEVEL_OUTOF10: 10
PAINLEVEL_OUTOF10: 7
PAINLEVEL_OUTOF10: 1
PAINLEVEL_OUTOF10: 0
PAINLEVEL_OUTOF10: 0
PAINLEVEL_OUTOF10: 8
PAINLEVEL_OUTOF10: 0
PAINLEVEL_OUTOF10: 5
PAINLEVEL_OUTOF10: 6
PAINLEVEL_OUTOF10: 8
PAINLEVEL_OUTOF10: 7
PAINLEVEL_OUTOF10: 3
PAINLEVEL_OUTOF10: 4
PAINLEVEL_OUTOF10: 10
PAINLEVEL_OUTOF10: 2
PAINLEVEL_OUTOF10: 7
PAINLEVEL_OUTOF10: 8
PAINLEVEL_OUTOF10: 8
PAINLEVEL_OUTOF10: 4
PAINLEVEL_OUTOF10: 8
PAINLEVEL_OUTOF10: 5
PAINLEVEL_OUTOF10: 8
PAINLEVEL_OUTOF10: 4
PAINLEVEL_OUTOF10: 4
PAINLEVEL_OUTOF10: 10
PAINLEVEL_OUTOF10: 8
PAINLEVEL_OUTOF10: 10
PAINLEVEL_OUTOF10: 2
PAINLEVEL_OUTOF10: 8
PAINLEVEL_OUTOF10: 8
PAINLEVEL_OUTOF10: 10
PAINLEVEL_OUTOF10: 2
PAINLEVEL_OUTOF10: 8
PAINLEVEL_OUTOF10: 0

## 2019-01-01 ASSESSMENT — PAIN DESCRIPTION - DESCRIPTORS
DESCRIPTORS: SHARP;THROBBING
DESCRIPTORS: CONSTANT
DESCRIPTORS: ACHING
DESCRIPTORS: ACHING;SORE
DESCRIPTORS: ACHING
DESCRIPTORS: ACHING
DESCRIPTORS: DISCOMFORT
DESCRIPTORS: ACHING
DESCRIPTORS: ACHING;SORE
DESCRIPTORS: ACHING;DISCOMFORT
DESCRIPTORS: ACHING
DESCRIPTORS: STABBING
DESCRIPTORS: CONSTANT
DESCRIPTORS: ACHING;SORE
DESCRIPTORS: ACHING
DESCRIPTORS_2: ACHING
DESCRIPTORS: ACHING;SORE
DESCRIPTORS: ACHING;DISCOMFORT
DESCRIPTORS: ACHING
DESCRIPTORS: ACHING
DESCRIPTORS: CONSTANT
DESCRIPTORS: CONSTANT
DESCRIPTORS: ACHING
DESCRIPTORS: STABBING
DESCRIPTORS: ACHING

## 2019-01-01 ASSESSMENT — PAIN DESCRIPTION - ORIENTATION
ORIENTATION: RIGHT
ORIENTATION: RIGHT
ORIENTATION: LEFT;RIGHT
ORIENTATION: RIGHT
ORIENTATION: LEFT
ORIENTATION: RIGHT
ORIENTATION: LEFT
ORIENTATION: RIGHT
ORIENTATION: RIGHT;LEFT
ORIENTATION: RIGHT
ORIENTATION: OTHER (COMMENT)
ORIENTATION_2: POSTERIOR
ORIENTATION: LEFT;RIGHT
ORIENTATION: LEFT
ORIENTATION: RIGHT
ORIENTATION: LEFT
ORIENTATION: RIGHT
ORIENTATION: RIGHT
ORIENTATION: LEFT
ORIENTATION: RIGHT
ORIENTATION: LEFT;RIGHT
ORIENTATION: LEFT
ORIENTATION: RIGHT
ORIENTATION: OTHER (COMMENT)
ORIENTATION: LEFT;RIGHT
ORIENTATION: LEFT;RIGHT
ORIENTATION: RIGHT
ORIENTATION: LEFT

## 2019-01-01 ASSESSMENT — PAIN DESCRIPTION - PAIN TYPE
TYPE: ACUTE PAIN
TYPE: CHRONIC PAIN
TYPE_2: CHRONIC PAIN
TYPE: ACUTE PAIN
TYPE: CHRONIC PAIN
TYPE: CHRONIC PAIN
TYPE: ACUTE PAIN
TYPE: ACUTE PAIN
TYPE: CHRONIC PAIN;ACUTE PAIN
TYPE: ACUTE PAIN;CHRONIC PAIN
TYPE: CHRONIC PAIN
TYPE: ACUTE PAIN
TYPE: CHRONIC PAIN
TYPE: ACUTE PAIN
TYPE: CHRONIC PAIN
TYPE: ACUTE PAIN
TYPE: ACUTE PAIN
TYPE: CHRONIC PAIN;ACUTE PAIN
TYPE: ACUTE PAIN;CHRONIC PAIN
TYPE: CHRONIC PAIN
TYPE: CHRONIC PAIN
TYPE: ACUTE PAIN;CHRONIC PAIN
TYPE: CHRONIC PAIN
TYPE: CHRONIC PAIN
TYPE: CHRONIC PAIN;ACUTE PAIN
TYPE: ACUTE PAIN
TYPE: CHRONIC PAIN;ACUTE PAIN
TYPE: ACUTE PAIN
TYPE: CHRONIC PAIN
TYPE: CHRONIC PAIN
TYPE: CHRONIC PAIN;ACUTE PAIN
TYPE: ACUTE PAIN
TYPE: CHRONIC PAIN
TYPE: CHRONIC PAIN
TYPE: ACUTE PAIN
TYPE: CHRONIC PAIN
TYPE: ACUTE PAIN
TYPE: ACUTE PAIN
TYPE: CHRONIC PAIN
TYPE: CHRONIC PAIN
TYPE: ACUTE PAIN
TYPE: CHRONIC PAIN

## 2019-01-01 ASSESSMENT — PAIN DESCRIPTION - FREQUENCY
FREQUENCY: INTERMITTENT
FREQUENCY: INTERMITTENT
FREQUENCY: CONTINUOUS
FREQUENCY: INTERMITTENT
FREQUENCY: INTERMITTENT
FREQUENCY: CONTINUOUS
FREQUENCY: INTERMITTENT
FREQUENCY: CONTINUOUS
FREQUENCY: INTERMITTENT
FREQUENCY: CONTINUOUS
FREQUENCY: INTERMITTENT
FREQUENCY: INTERMITTENT
FREQUENCY: CONTINUOUS
FREQUENCY: CONTINUOUS
FREQUENCY: INTERMITTENT
FREQUENCY: INTERMITTENT
FREQUENCY: CONTINUOUS
FREQUENCY: CONTINUOUS
FREQUENCY: INTERMITTENT
FREQUENCY: INTERMITTENT
FREQUENCY: CONTINUOUS

## 2019-01-01 ASSESSMENT — PAIN - FUNCTIONAL ASSESSMENT
PAIN_FUNCTIONAL_ASSESSMENT: PREVENTS OR INTERFERES SOME ACTIVE ACTIVITIES AND ADLS
PAIN_FUNCTIONAL_ASSESSMENT: PREVENTS OR INTERFERES WITH ALL ACTIVE AND SOME PASSIVE ACTIVITIES
PAIN_FUNCTIONAL_ASSESSMENT: PREVENTS OR INTERFERES SOME ACTIVE ACTIVITIES AND ADLS
PAIN_FUNCTIONAL_ASSESSMENT: INTOLERABLE, UNABLE TO DO ANY ACTIVE OR PASSIVE ACTIVITIES
PAIN_FUNCTIONAL_ASSESSMENT: PREVENTS OR INTERFERES SOME ACTIVE ACTIVITIES AND ADLS
PAIN_FUNCTIONAL_ASSESSMENT: ACTIVITIES ARE NOT PREVENTED
PAIN_FUNCTIONAL_ASSESSMENT: PREVENTS OR INTERFERES SOME ACTIVE ACTIVITIES AND ADLS
PAIN_FUNCTIONAL_ASSESSMENT: PREVENTS OR INTERFERES SOME ACTIVE ACTIVITIES AND ADLS
PAIN_FUNCTIONAL_ASSESSMENT: PREVENTS OR INTERFERES WITH ALL ACTIVE AND SOME PASSIVE ACTIVITIES
PAIN_FUNCTIONAL_ASSESSMENT: ACTIVITIES ARE NOT PREVENTED
PAIN_FUNCTIONAL_ASSESSMENT: ACTIVITIES ARE NOT PREVENTED
PAIN_FUNCTIONAL_ASSESSMENT: PREVENTS OR INTERFERES SOME ACTIVE ACTIVITIES AND ADLS
PAIN_FUNCTIONAL_ASSESSMENT: PREVENTS OR INTERFERES WITH MANY ACTIVE NOT PASSIVE ACTIVITIES
PAIN_FUNCTIONAL_ASSESSMENT: PREVENTS OR INTERFERES SOME ACTIVE ACTIVITIES AND ADLS
PAIN_FUNCTIONAL_ASSESSMENT: PREVENTS OR INTERFERES SOME ACTIVE ACTIVITIES AND ADLS
PAIN_FUNCTIONAL_ASSESSMENT: INTOLERABLE, UNABLE TO DO ANY ACTIVE OR PASSIVE ACTIVITIES

## 2019-01-01 ASSESSMENT — PAIN DESCRIPTION - ONSET
ONSET_2: ON-GOING
ONSET: ON-GOING
ONSET: SUDDEN
ONSET: ON-GOING
ONSET: SUDDEN
ONSET: ON-GOING
ONSET: ON-GOING
ONSET: PROGRESSIVE

## 2019-01-01 ASSESSMENT — PAIN DESCRIPTION - LOCATION
LOCATION: BACK
LOCATION: HIP
LOCATION: HIP;SHOULDER
LOCATION: HIP;SHOULDER
LOCATION: BACK;HIP
LOCATION: SHOULDER
LOCATION: HIP;SHOULDER
LOCATION: HIP;SHOULDER
LOCATION: BACK
LOCATION: SHOULDER
LOCATION: BACK
LOCATION: SHOULDER
LOCATION: SHOULDER
LOCATION: RIB CAGE
LOCATION: BACK
LOCATION: BACK;HIP
LOCATION: SHOULDER;HIP
LOCATION: BACK;HIP
LOCATION: HIP;SHOULDER
LOCATION: BACK
LOCATION: SHOULDER;ARM
LOCATION: SHOULDER
LOCATION: HIP;SHOULDER
LOCATION: HIP;SHOULDER
LOCATION: SHOULDER
LOCATION: ARM;SHOULDER
LOCATION_2: NECK
LOCATION: HIP;SHOULDER
LOCATION: SHOULDER
LOCATION: HIP;SHOULDER
LOCATION: BACK;NECK
LOCATION: SHOULDER;HIP
LOCATION: HIP
LOCATION: GENERALIZED;SHOULDER
LOCATION: BACK
LOCATION: SHOULDER
LOCATION: BACK;HIP
LOCATION: BACK
LOCATION: SHOULDER
LOCATION: SHOULDER
LOCATION: BACK
LOCATION: BACK;NECK

## 2019-01-01 ASSESSMENT — PAIN DESCRIPTION - PROGRESSION
CLINICAL_PROGRESSION: GRADUALLY IMPROVING
CLINICAL_PROGRESSION: GRADUALLY WORSENING
CLINICAL_PROGRESSION: GRADUALLY IMPROVING
CLINICAL_PROGRESSION: GRADUALLY WORSENING
CLINICAL_PROGRESSION: GRADUALLY IMPROVING
CLINICAL_PROGRESSION: RAPIDLY IMPROVING
CLINICAL_PROGRESSION: NOT CHANGED
CLINICAL_PROGRESSION: GRADUALLY IMPROVING
CLINICAL_PROGRESSION: NOT CHANGED
CLINICAL_PROGRESSION: GRADUALLY WORSENING
CLINICAL_PROGRESSION: GRADUALLY IMPROVING
CLINICAL_PROGRESSION: GRADUALLY WORSENING
CLINICAL_PROGRESSION: GRADUALLY IMPROVING
CLINICAL_PROGRESSION: NOT CHANGED
CLINICAL_PROGRESSION: GRADUALLY WORSENING
CLINICAL_PROGRESSION: NOT CHANGED
CLINICAL_PROGRESSION_2: GRADUALLY WORSENING

## 2019-01-01 ASSESSMENT — ENCOUNTER SYMPTOMS
WHEEZING: 0
SHORTNESS OF BREATH: 0
BACK PAIN: 0
NAUSEA: 0
SORE THROAT: 0
COUGH: 0
ABDOMINAL PAIN: 0
VOMITING: 0
DIARRHEA: 0
EYE PAIN: 0
RHINORRHEA: 0
EYE DISCHARGE: 0

## 2019-01-01 ASSESSMENT — PAIN DESCRIPTION - INTENSITY: RATING_2: 4

## 2019-01-01 ASSESSMENT — PAIN DESCRIPTION - DURATION: DURATION_2: CONTINUOUS

## 2019-01-01 ASSESSMENT — PAIN SCALES - WONG BAKER: WONGBAKER_NUMERICALRESPONSE: 0

## 2019-03-15 PROBLEM — I50.43 CHF (CONGESTIVE HEART FAILURE), NYHA CLASS I, ACUTE ON CHRONIC, COMBINED (HCC): Status: ACTIVE | Noted: 2019-01-01

## 2019-03-19 PROBLEM — E44.0 MODERATE MALNUTRITION (HCC): Status: ACTIVE | Noted: 2019-01-01

## 2019-05-07 PROBLEM — I48.91 ATRIAL FIBRILLATION WITH RVR (HCC): Status: ACTIVE | Noted: 2019-01-01

## 2019-05-07 NOTE — ED NOTES
Bed: B-05  Expected date: 5/7/19  Expected time: 7:42 AM  Means of arrival: [facility]  Comments:  [address], RN  05/07/19 2061

## 2019-05-07 NOTE — ED PROVIDER NOTES
[facility]  eMERGENCY dEPARTMENT eNCOUnter        Pt Name: Kevon Almodovar  MRN: [id number]  [place] 3/11/1930  Date of evaluation: 5/7/2019  Provider: Chelsey Newell MD  PCP: Khang Rogers MD      CHIEF COMPLAINT       Chief Complaint   Patient presents with    Fall     Patient states she got up this morning to go to the bathroom and adjust the heat and fell over her walker. Patient c/o right shoulder and right hip pain. HISTORY OFPRESENT ILLNESS   (Location/Symptom, Timing/Onset, Context/Setting, Quality, Duration, Modifying Factors,Severity)  Note limiting factors. Kevon Almodovar is a 80 y.o. female       Location/Symptom: Fall injury to right shoulder right hip  Timing/Onset: This morning  Context/Setting: Patient states she was going to the bathroom. She was turning to adjust her walker when she got her feet caught up and fell. Complaining of pain to the right shoulder and to a lesser extent the right hip. Denies being otherwise ill with fever chills nausea vomiting. Quality: Just hurts  Duration: Constant  Modifying Factors: Moving. She did have to come in by ambulance  Severity: 10 out of [id number] all reviewed and agreed with or any disagreements were addressed  in the HPI. REVIEW OF SYSTEMS    (2-9 systems for level 4, 10 or more for level 5)     Review of Systems   Constitutional: Negative for chills, fatigue and fever. HENT: Negative for ear pain, rhinorrhea and sore throat. Eyes: Negative for pain, discharge and visual disturbance. Respiratory: Negative for cough, shortness of breath and wheezing. Cardiovascular: Negative for chest pain, palpitations and leg swelling. Gastrointestinal: Negative for abdominal pain, diarrhea, nausea and vomiting. Genitourinary: Negative for difficulty urinating, dysuria, pelvic pain and vaginal discharge. Musculoskeletal: Positive for gait problem.  Negative for arthralgias, back pain, joint swelling Mother     Cancer Father     Hearing Loss Father     Cancer Brother         bladder    Allergies Brother     Other Brother         GI issues, unknown    Cancer Brother         skin    Parkinsonism Brother     Asthma Brother           SOCIAL HISTORY       Social History     Socioeconomic History    Marital status:       Spouse name: None    Number of children: None    Years of education: None    Highest education level: None   Occupational History    Occupation: retired   Social Needs    Financial resource strain: None    Food insecurity:     Worry: None     Inability: None    Transportation needs:     Medical: None     Non-medical: None   Tobacco Use    Smoking status: Former Smoker     Types: Cigarettes     Last attempt to quit: 1980     Years since quittin.4    Smokeless tobacco: Never Used   Substance and Sexual Activity    Alcohol use: No    Drug use: No    Sexual activity: Not Currently   Lifestyle    Physical activity:     Days per week: None     Minutes per session: None    Stress: None   Relationships    Social connections:     Talks on phone: None     Gets together: None     Attends [employer] service: None     Active member of club or organization: None     Attends meetings of clubs or organizations: None     Relationship status: None    Intimate partner violence:     Fear of current or ex partner: None     Emotionally abused: None     Physically abused: None     Forced sexual activity: None   Other Topics Concern    None   Social History Narrative    None       SCREENINGS    Radha Coma Scale  Eye Opening: Spontaneous  Best Verbal Response: Oriented  Best Motor Response: Obeys commands  Radha Coma Scale Score: 15        PHYSICAL EXAM    (up to 7 for level 4, 8 or more for level 5)     ED Triage Vitals [19 0807]   BP Temp Temp Source Pulse Resp SpO2 Height Weight   (!) 146/86 -- Oral 86 -- 95 % 4' 11\" (1.499 m) 99 lb 3.3 oz (45 kg)      height is 4' 11\" (1.499 m) and weight is 99 lb 3.3 oz (45 kg). Her oral temperature is 97.5 °F (36.4 °C). Her blood pressure is 114/69 and her pulse is 90. Her respiration is 18 and oxygen saturation is 94%. Physical Exam   Constitutional: She is oriented to person, place, and time. She appears well-developed and well-nourished. HENT:   Head: Atraumatic. Right Ear: External ear normal.   Left Ear: External ear normal.   Evidence of previous craniotomy with surgical scar and defect along the left frontal parietal scalp. However no appreciable acute trauma. Eyes: Right eye exhibits no discharge. Left eye exhibits no discharge. No scleral icterus. Neck: Normal range of motion. No JVD present. No tracheal deviation present. No thyromegaly present. Cardiovascular: Normal rate and regular rhythm. Exam reveals no gallop and no friction rub. No murmur heard. Pulmonary/Chest: Effort normal and breath sounds normal. No stridor. No respiratory distress. She has no wheezes. She has no rales. Abdominal: Soft. She exhibits no distension. There is no tenderness. There is no rebound and no guarding. Musculoskeletal: She exhibits no edema. Right shoulder: She exhibits decreased range of motion, tenderness, swelling, deformity and pain. She is scattered bruises on both lower extremities. However no bony tenderness in either knee or lower extremity low the knee. She has some pain in the right hip but she is able to lift it up and also I did not elicit any pain with external/internal rotation. Neurological: She is alert and oriented to person, place, and time. Coordination normal.   Skin: Skin is warm and dry. No rash (On exposed body surfaces) noted. She is not diaphoretic. Psychiatric: She has a normal mood and affect.  Her behavior is normal. Thought content normal.       DIAGNOSTIC RESULTS   LABS:    Results for orders placed or performed during the hospital encounter of 05/07/19   CBC Auto Differential   Result before 15-MAR-2019)ST & T wave abnormality, consider lateral ischemia or digitalis effectAbnormal ECGWhen compared with ECG of 15-MAR-2019 17:09,Vent. rate has decreased BY  77 BPM       All other labs were within normal range or not returned as of this dictation. EKG: All EKG's are interpreted by the Emergency Department Physician who either signs orCo-signs this chart in the absence of a cardiologist.    EKG visualized preliminarily interpreted by myself shows atrial fibrillation. The rate here is 92 although at times on the monitor she is over 100. There is a left anterior fascicular block with an axis of -66. There are aberrantly conducted wide complexes with different morphology. Very likely supraventricular. Electrical interference effects interpretation. Nonspecific ST findings and wandering baseline. No overt ST elevation. RADIOLOGY:   Non-plain film images such as CT, Ultrasound and MRI are read by the radiologist. [facility] Rad radiographic images are visualized and preliminarily interpreted by the  EDProvider with the below findings:    Xr Shoulder Right (min 2 Views)    Result Date: 5/7/2019  EXAMINATION: 3 XRAY VIEWS OF THE RIGHT SHOULDER 5/7/2019 9:08 am COMPARISON: Radiographs of the right shoulder 12/14/2017. HISTORY: ORDERING SYSTEM PROVIDED HISTORY: injury TECHNOLOGIST PROVIDED HISTORY: Reason for exam:->injury Ordering Physician Provided Reason for Exam: fell this A.M. Acuity: Acute Type of Exam: Initial FINDINGS: There is an acute comminuted mildly displaced fracture of the proximal right humerus. Fracture lines likely extend to involve both the lesser and greater tuberosities. There is no acute dislocation. No radiopaque foreign bodies are identified. The acromioclavicular joint is well aligned. Partial imaging of the right chest demonstrates no acute abnormality, advanced multilevel degenerative changes are present throughout the thoracic spine.      Acute comminuted mildly displaced fracture of the proximal right humerus, as described above. Ct Head Wo Contrast    Result Date: 5/7/2019  EXAMINATION: CT OF THE HEAD WITHOUT CONTRAST; CT OF THE CERVICAL SPINE WITHOUT CONTRAST 5/7/2019 8:24 am TECHNIQUE: CT of the head was performed without the administration of intravenous contrast. Dose modulation, iterative reconstruction, and/or weight based adjustment of the mA/kV was utilized to reduce the radiation dose to as low as reasonably achievable.; CT of the cervical spine was performed without the administration of intravenous contrast. Multiplanar reformatted images are provided for review. Dose modulation, iterative reconstruction, and/or weight based adjustment of the mA/kV was utilized to reduce the radiation dose to as low as reasonably achievable. COMPARISON: CT head April, 6, 2018. HISTORY: ORDERING SYSTEM PROVIDED HISTORY: fall, hx of surgery for CA TECHNOLOGIST PROVIDED HISTORY: Has a \"code stroke\" or \"stroke alert\" been called? ->No Ordering Physician Provided Reason for Exam:  (Patient states she got up this morning to go to the bathroom and adjust the heat and fell over her walker. Patient c/o right shoulder and right hip pain. ) Acuity: Acute Type of Exam: Initial; ORDERING SYSTEM PROVIDED HISTORY: FAll TECHNOLOGIST PROVIDED HISTORY: Ordering Physician Provided Reason for Exam:  (Patient states she got up this morning to go to the bathroom and adjust the heat and fell over her walker. Patient c/o right shoulder and right hip pain. ) Acuity: Acute Type of Exam: Initial FINDINGS: BRAIN/VENTRICLES: There is no acute intracranial hemorrhage, mass effect or midline shift. No abnormal extra-axial fluid collection. There mild diffuse atrophy mild periventricular compared to. Small basal again. The gray-white differentiation is maintained without evidence of an acute infarct. There is no evidence of hydrocephalus.  ORBITS: The visualized portion of the orbits demonstrate no acute abnormality. SINUSES: There is partial opacification right mastoid air cells. The left mastoid air cells and paranasal sinuses are clear. SOFT TISSUES/SKULL:  There appears to be surface frontal.  Inner table is intact. Soft tissue appears absent over the site Cervical spine: There is normal alignment at the craniocervical junction. Occipital condyles are unremarkable. There is minimal 2-3 mm degenerative anterolisthesis of C6 on C7. Cervical vertebra otherwise demonstrate normal height and alignment. No fracture, subluxation, or suspicious osseous lesion identified. Moderate degenerative changes are seen at C1-C. There is disc height loss with posterior endplate hypertrophic spurring at C3-C4, C5-C6, and C6-C7. Prevertebral soft tissues are clear. Emphysematous changes are seen in the visualized lung apices. There is a defect involving the outer cortex of the left frontal bone. It is unclear whether this is posttraumatic or postsurgical.  No acute intracranial abnormality identified. Mild atrophy and chronic white matter changes, similar in appearance. Multilevel degenerative changes in the cervical spine. No acute osseous abnormality. Ct Cervical Spine Wo Contrast    Result Date: 5/7/2019  EXAMINATION: CT OF THE HEAD WITHOUT CONTRAST; CT OF THE CERVICAL SPINE WITHOUT CONTRAST 5/7/2019 8:24 am TECHNIQUE: CT of the head was performed without the administration of intravenous contrast. Dose modulation, iterative reconstruction, and/or weight based adjustment of the mA/kV was utilized to reduce the radiation dose to as low as reasonably achievable.; CT of the cervical spine was performed without the administration of intravenous contrast. Multiplanar reformatted images are provided for review. Dose modulation, iterative reconstruction, and/or weight based adjustment of the mA/kV was utilized to reduce the radiation dose to as low as reasonably achievable. COMPARISON: CT head April, 6, 2018.  HISTORY: ORDERING SYSTEM PROVIDED HISTORY: fall, hx of surgery for CA TECHNOLOGIST PROVIDED HISTORY: Has a \"code stroke\" or \"stroke alert\" been called? ->No Ordering Physician Provided Reason for Exam:  (Patient states she got up this morning to go to the bathroom and adjust the heat and fell over her walker. Patient c/o right shoulder and right hip pain. ) Acuity: Acute Type of Exam: Initial; ORDERING SYSTEM PROVIDED HISTORY: FAll TECHNOLOGIST PROVIDED HISTORY: Ordering Physician Provided Reason for Exam:  (Patient states she got up this morning to go to the bathroom and adjust the heat and fell over her walker. Patient c/o right shoulder and right hip pain. ) Acuity: Acute Type of Exam: Initial FINDINGS: BRAIN/VENTRICLES: There is no acute intracranial hemorrhage, mass effect or midline shift. No abnormal extra-axial fluid collection. There mild diffuse atrophy mild periventricular compared to. Small basal again. The gray-white differentiation is maintained without evidence of an acute infarct. There is no evidence of hydrocephalus. ORBITS: The visualized portion of the orbits demonstrate no acute abnormality. SINUSES: There is partial opacification right mastoid air cells. The left mastoid air cells and paranasal sinuses are clear. SOFT TISSUES/SKULL:  There appears to be surface frontal.  Inner table is intact. Soft tissue appears absent over the site Cervical spine: There is normal alignment at the craniocervical junction. Occipital condyles are unremarkable. There is minimal 2-3 mm degenerative anterolisthesis of C6 on C7. Cervical vertebra otherwise demonstrate normal height and alignment. No fracture, subluxation, or suspicious osseous lesion identified. Moderate degenerative changes are seen at C1-C. There is disc height loss with posterior endplate hypertrophic spurring at C3-C4, C5-C6, and C6-C7. Prevertebral soft tissues are clear. Emphysematous changes are seen in the visualized lung apices. There is a defect involving the outer cortex of the left frontal bone. It is unclear whether this is posttraumatic or postsurgical.  No acute intracranial abnormality identified. Mild atrophy and chronic white matter changes, similar in appearance. Multilevel degenerative changes in the cervical spine. No acute osseous abnormality. Xr Hip 2-3 Vw W Pelvis Right    Result Date: 5/7/2019  EXAMINATION: SINGLE XRAY VIEW OF THE PELVIS AND 2 XRAY VIEWS RIGHT HIP 5/7/2019 9:08 am COMPARISON: None. HISTORY: ORDERING SYSTEM PROVIDED HISTORY: fall TECHNOLOGIST PROVIDED HISTORY: Reason for exam:->fall Ordering Physician Provided Reason for Exam: fell Acuity: Acute Type of Exam: Initial Relevant Medical/Surgical History: pain FINDINGS: There is a bipolar right hip prosthesis. Alignment appears normal.  No significant periprosthetic lucency is appreciated. There is generalized osteopenia of the pelvis. There is osseous hypertrophy of the left superior pubic ramus, consistent with old injury. This is unchanged. No acute fractures are appreciated. Moderate osteoarthritis of the left hip. Aortic calcifications. Right hip prosthesis is in normal alignment. No acute fracture is appreciated.            PROCEDURES   Unless otherwise noted below, none     Procedures    CRITICAL CARE TIME   N/A    CONSULTS:  None    EMERGENCY DEPARTMENT COURSE and DIFFERENTIAL DIAGNOSIS/MDM:   Vitals:    Vitals:    05/07/19 1131 05/07/19 1147 05/07/19 1202 05/07/19 1230   BP: 118/66 115/78 105/65 114/69   Pulse:  89 92 90   Resp:  18 17 18   Temp:   98.2 °F (36.8 °C) 97.5 °F (36.4 °C)   TempSrc:    Oral   SpO2:  92% 92% 94%   Weight:       Height:           Patient was given the following medications:  Medications   albuterol sulfate  (90 Base) MCG/ACT inhaler 2 puff (has no administration in time range)   oyster shell calcium/vitamin D 250-125 MG-UNIT per tablet 250 mg (250 mg Oral Given 5/7/19 1435)   diclofenac sodium 1 % gel 2 g (has no administration in time range)   docusate sodium (COLACE) capsule 100 mg (100 mg Oral Given 5/7/19 1435)   pantoprazole (PROTONIX) tablet 40 mg (40 mg Oral Given 5/7/19 1436)   mometasone-formoterol (DULERA) 100-5 MCG/ACT inhaler 2 puff ( Inhalation Canceled Entry 5/7/19 1342)   guaiFENesin (MUCINEX) extended release tablet 600 mg (has no administration in time range)   levothyroxine (SYNTHROID) tablet 100 mcg (has no administration in time range)   lisinopril (PRINIVIL;ZESTRIL) tablet 5 mg (5 mg Oral Given 5/7/19 1435)   midodrine (PROAMATINE) tablet 5 mg (5 mg Oral Given 5/7/19 1435)   torsemide (DEMADEX) tablet 20 mg (20 mg Oral Given 5/7/19 1435)   torsemide (DEMADEX) tablet 40 mg (has no administration in time range)   vitamin D (CHOLECALCIFEROL) tablet 1,000 Units (1,000 Units Oral Given 5/7/19 1435)   sodium chloride flush 0.9 % injection 10 mL (has no administration in time range)   sodium chloride flush 0.9 % injection 10 mL (has no administration in time range)   ondansetron (ZOFRAN) injection 4 mg (has no administration in time range)   acetaminophen (TYLENOL) tablet 650 mg (has no administration in time range)   tiotropium (SPIRIVA) inhalation capsule 18 mcg (has no administration in time range)   enoxaparin (LOVENOX) injection 30 mg (has no administration in time range)   HYDROcodone-acetaminophen (NORCO) 5-325 MG per tablet 1 tablet (has no administration in time range)   midodrine (PROAMATINE) tablet 5 mg (5 mg Oral Given 5/7/19 1136)   HYDROcodone-acetaminophen (NORCO) 5-325 MG per tablet 1 tablet (1 tablet Oral Given 5/7/19 1136)       Home management is definitely going to be a problem for this patient. I do have a call out to the hospitalist.      FINAL IMPRESSION      1. Fall, initial encounter    2. Fracture of humeral head, closed, right, initial encounter    3. Permanent atrial fibrillation ([address]    4.  Gait abnormality          DISPOSITION/PLAN    DISPOSITION Admitted 05/07/2019 11:40:07 AM      PATIENT REFERRED TO:  Bruce Aguilard, [id number] [facility] [id number]            DISCHARGE MEDICATIONS:  Current Discharge Medication List          DISCONTINUED MEDICATIONS:  Current Discharge Medication List                 (Please note that portions of this note were completed with a voice recognition program.  Efforts were made to editthe dictations but occasionally words are mis-transcribed.)    Jessee Acñua MD (electronically signed)           Jessee Acuña MD  05/07/19 [id number]

## 2019-05-07 NOTE — PLAN OF CARE
Problem: Falls - Risk of:  Goal: Will remain free from falls  Description  Will remain free from falls  Outcome: Ongoing     Problem: Falls - Risk of:  Goal: Absence of physical injury  Description  Absence of physical injury  Outcome: Ongoing     Problem: Pain:  Goal: Pain level will decrease  Description  Pain level will decrease  Outcome: Ongoing     Problem: Pain:  Goal: Control of acute pain  Description  Control of acute pain  Outcome: Ongoing     Problem: Pain:  Goal: Control of chronic pain  Description  Control of chronic pain  Outcome: Ongoing     Problem: Urinary Elimination:  Goal: Signs and symptoms of infection will decrease  Description  Signs and symptoms of infection will decrease  Outcome: Ongoing     Problem: Urinary Elimination:  Goal: Complications related to the disease process, condition or treatment will be avoided or minimized  Description  Complications related to the disease process, condition or treatment will be avoided or minimized  Outcome: Ongoing     Problem: Infection:  Goal: Will remain free from infection  Description  Will remain free from infection  Outcome: Ongoing     Problem: Safety:  Goal: Free from accidental physical injury  Description  Free from accidental physical injury  Outcome: Ongoing     Problem: Safety:  Goal: Free from intentional harm  Description  Free from intentional harm  Outcome: Ongoing     Problem: Daily Care:  Goal: Daily care needs are met  Description  Daily care needs are met  Outcome: Ongoing

## 2019-05-07 NOTE — PROGRESS NOTES
Pt arrived to the unit from ED. Pt is alert and oriented. VSS. Pt c/o 9 out of 10 R shoulder pain that's constant. Pt a-fib on tele. Family at bedside. Call light within reach. Nonskid socks on. Will continue to monitor.

## 2019-05-07 NOTE — ED NOTES
Patient states she fell this morning. Patient denies loc. Patient does not remember if she hit her head. Patient c/o right shoulder,elbow and arm pain and left hip pain. Skin is warm and dry distal to injuries on right arm and right leg. Patient has strong radial pulse and strong pedal pulses. Cap refill less than 3. Patient has deformity to right shoulder. Patient is able to wiggle fingers but is unable to move right arm. Patient is able to bend knee on right side.      Regan Bourgeois RN  05/07/19 [id number]

## 2019-05-07 NOTE — H&P
Hospital Medicine History & Physical      PCP: Javier Ibarra MD    Date of Admission: 5/7/2019    Date of Service: Pt seen/examined on 5/7/19 and Admitted to Inpatient     Chief Complaint:  Fall    History Of Present Illness: The patient is a 80 y.o. female who presents to [facility] with mechanical fall. She was on her way to another room when she decided to turn and look at the TV to see what the temp would be when she fell over landing on her right side. Patient was then unable to get up so EMS was called. In ED patient was found to have fractured right humerus along with a fib with RVR. Patient will need to be admitted for further intervention and workup. Patient denies any fever, chills, N/V, D/C, chest pain or SOB. Her labs did show some CKD along with elevated K. Past Medical History:        Diagnosis Date    Atrial fibrillation ([facility]     C. difficile colitis     history of/  negative specimen 4/12/2018    Cancer ([facility] [address]     breast left, skin     Cardiomyopathy ([facility] [address] [facility]     Chemotherapy     CHF (congestive heart failure) (HCC)     COPD (chronic obstructive pulmonary disease) (HCC)     Dependence on supplemental oxygen     2LNC    Hypertension     Hypothyroidism     Osteoarthritis     Rosacea        Past Surgical History:        Procedure Laterality Date    BREAST SURGERY      Left breast lumpectomy    BUNIONECTOMY Right     CATARACT REMOVAL Bilateral     COLONOSCOPY  2011    EYE SURGERY      FOOT SURGERY      toe    HIP ARTHROPLASTY Right 09/12/2017    right bipolar hip    MOHS SURGERY  7/2012    PARATHYROIDECTOMY      SD LEFT HEART CATH,PERCUTANEOUS      Cardiac Cath, Left Heart       Medications Prior to Admission:    Prior to Admission medications    Medication Sig Start Date End Date Taking?  Authorizing Provider   torsemide (DEMADEX) 20 MG tablet Take 20 mg by mouth four times a week TUE/THUR/SAT/SUN   Yes Historical Provider, MD   torsemide (DEMADEX) 20 MG tablet Take 40 mg by mouth three times a week 40 MG mon/wed/fri   Yes Historical Provider, MD   lisinopril (PRINIVIL;ZESTRIL) 5 MG tablet Take 1 tablet by mouth daily 3/26/19  Yes Sonia Fails, APRN - CNP   midodrine (PROAMATINE) 5 MG tablet Take 1 tablet by mouth 3 times daily (with meals) 12/1/18  Yes Michael Pagan MD   methylcellulose (CITRUCEL) 500 MG TABS Take 2 tablets by mouth daily   Yes Historical Provider, MD   esomeprazole (WOMN1 [employer]) 20 MG delayed release capsule Take 20 mg by mouth Daily with supper   Yes Historical Provider, MD   diclofenac sodium 1 % GEL Apply 2 g topically 4 times daily as needed for Pain (Back and left hifp)    Yes Historical Provider, MD   tiotropium (SPIRIVA RESPIMAT) 1.25 MCG/ACT AERS inhaler Inhale 2 puffs into the lungs daily 9/20/18  Yes Sloane Dillard MD   albuterol sulfate HFA (PROAIR HFA) 108 (90 Base) MCG/ACT inhaler Inhale 2 puffs into the lungs every 4 hours as needed for Wheezing or Shortness of Breath 9/20/18  Yes Sloane Dillard MD   docusate sodium (COLACE) 100 MG capsule Take 100 mg by mouth daily   Yes Historical Provider, MD   Probiotic Product (PROBIOTIC DAILY PO) Take 1 tablet by mouth daily as needed (antibiotic use)    Yes Historical Provider, MD   Misc Natural Products (OSTEO BI-FLEX JOINT SHIELD) TABS Take 1 tablet by mouth daily   Yes Historical Provider, MD   BOSWELLIA MESFIN PO Take 1 tablet by mouth daily    Yes Historical Provider, MD   COLLAGEN PO Take 10 mg by mouth daily   Yes Historical Provider, MD   levothyroxine (SYNTHROID) 100 MCG tablet Take 1 tablet by mouth Daily 4/17/18  Yes Michael Pagan MD   guaiFENesin (MUCINEX) 600 MG extended release tablet Take 600 mg by mouth 2 times daily as needed for Congestion    Yes Historical Provider, MD   fluticasone-salmeterol (ADVAIR HFA) 115-21 MCG/ACT inhaler Inhale 2 puffs into the lungs 2 times daily   Yes Historical Provider, MD   Calcium Carb-Cholecalciferol (CALCIUM-VITAMIN D) 600-400 MG-UNIT TABS Take 1 tablet by mouth daily   Yes Historical Provider, MD   metroNIDAZOLE (METROGEL) 0.75 % gel Apply topically 2 times daily Apply to face 2 times daily. Yes Historical Provider, MD   vitamin D (CHOLECALCIFEROL) 1000 units TABS tablet Take 1,000 Units by mouth daily    Yes Historical Provider, MD   HYDROcodone-acetaminophen (NORCO) 5-325 MG per tablet Take 1 tablet by mouth every 8 hours as needed for Pain. .   Yes Historical Provider, MD   OXYGEN 2 L/min continuous    Yes Historical Provider, MD   Multiple Vitamins-Minerals (MULTI FOR HER 50+ PO) Take 1 tablet by mouth daily    Yes Historical Provider, MD       Allergies:  Adhesive tape    Social History:  The patient currently lives at home with daughter    TOBACCO:   reports that she quit smoking about 38 years ago. Her smoking use included cigarettes. She has never used smokeless tobacco.  ETOH:   reports that she does not drink alcohol. Family History:  Reviewed in detail and negative for DM, Early CAD, Cancer, CVA. Positive as follows:        Problem Relation Age of Onset    Heart Disease Mother     Cancer Father     Hearing Loss Father     Cancer Brother         bladder    Allergies Brother     Other Brother         GI issues, unknown    Cancer Brother         skin    Parkinsonism Brother     Asthma Brother        REVIEW OF SYSTEMS:   Positive for as noted in the HPI. All other systems reviewed and negative. PHYSICAL EXAM:    /69   Pulse 90   Temp 97.5 °F (36.4 °C) (Oral)   Resp 18   Ht 4' 11\" (1.499 m)   Wt 99 lb 3.3 oz (45 kg)   SpO2 94%   BMI 20.04 kg/m²     General appearance: No apparent distress appears stated age and cooperative. HEENT Normal cephalic, atraumatic without obvious deformity. Pupils equal, round, and reactive to light. Extra ocular muscles intact. Conjunctivae/corneas clear. Neck: Supple, No jugular venous distention/bruits. Trachea midline without thyromegaly or adenopathy with full range of motion. Lungs: Clear to auscultation, bilaterally without Rales/Wheezes/Rhonchi with good respiratory effort. Heart: irregular rate and rhythm with Normal S1/S2 without murmurs, rubs or gallops, point of maximum impulse non-displaced  Abdomen: Soft, non-tender or non-distended without rigidity or guarding and positive bowel sounds all four quadrants. Extremities: right upper ext in sling, tender to palpation  Skin: Skin color, texture, turgor normal.  No rashes or lesions. Neurologic: Alert and oriented X 3, neurovascularly intact with sensory/motor intact upper extremities/lower extremities, bilaterally. Cranial nerves: II-XII intact, grossly non-focal.  Mental status: Alert, oriented, thought content appropriate.   Capillary Refill: Acceptable  < 3 seconds  Peripheral Pulses: +3 Easily felt, not easily obliterated with pressure      XR:  I have reviewed the XR with the following interpretation: comminuted fracture of proximal right humerus    XR of R hip negative for acute pathology    EKG:  I have reviewed the EKG with the following interpretation:a fib with rvr    CBC   Recent Labs     05/07/19  0835   WBC 3.9*   HGB 10.9*   HCT 33.4*   *      RENAL  Recent Labs     05/07/19  1008      K 5.4*   CL 99   CO2 29   BUN 64*   CREATININE 1.5*     LFT'S  Recent Labs     05/07/19  1008   AST 25   ALT 25   BILITOT <0.2   ALKPHOS 116     COAG  Recent Labs     05/07/19  0835   INR 0.94     CARDIAC ENZYMES  Recent Labs     05/07/19  0835   TROPONINI <0.01       U/A:    Lab Results   Component Value Date    COLORU YELLOW 11/23/2018    WBCUA 160 04/06/2018    RBCUA 1 04/06/2018    BACTERIA 3+ 04/06/2018    CLARITYU Clear 11/23/2018    SPECGRAV 1.008 11/23/2018    LEUKOCYTESUR Negative 11/23/2018    BLOODU Negative 11/23/2018    GLUCOSEU Negative 11/23/2018 GLUCOSEU NEGATIVE 02/15/2011    AMORPHOUS 2+ 02/03/2011       ABG    Lab Results   Component Value Date    MPS0EID 27.9 03/15/2019    BEART 1.4 03/15/2019    V1GVVXBX 98.3 03/15/2019    PHART 7.321 03/15/2019    OEU5NCB 55.6 03/15/2019    PO2ART 230.0 03/15/2019    XVP3FBT 29.6 03/15/2019         PHYSICIANS CERTIFICATION:    I certify that Kristi Dawn is expected to be hospitalized for more than 2 midnights based on the following assessment and plan:      ASSESSMENT/PLAN:    Right Humerus Fracture  - ortho consulted, nonoperative  - sling  - pain control  - PT/OT    Right Hip Pain  - initial XR neg for fracture  - if pain continues and unable to move we may need CT  - PT/OT  - pain control    Permanent Atrial Fib  - continue home meds  - may need IV therapy if rate up again    Fall  - possible mechanical vs a fib induced vs BP  - PT/OT    Thromboctopenia  - monitor  - no acute bleeding issues now    Anemia  - Hg 10  - with fractures will need to monitor    CKD Stage 3  - continue to monitor    Hypothryoidism  - continue home synthroid      DVT Prophylaxis: lovenox  Diet: DIET GENERAL;  Code Status: DNR-CC  PT/OT Eval Status: Ordered    Dispo - Eneida Salgado MD    Thank you Isaiah Cordon MD for the opportunity to be involved in this patient's care. If you have any questions or concerns please feel free to contact me at [phone].

## 2019-05-07 NOTE — ED NOTES
Fall risk precautions in place. Bed in lowest position with wheels locked,bed alarm in place and activated, fall risk ID on pt, call light in reach, will continue to monitor.       Gabriela Mercado RN  05/07/19 [id number]

## 2019-05-07 NOTE — ED NOTES
Report called to 4N RN. All questions answered. EDT to transport.       Natan Coker, RN  05/07/19 4702

## 2019-05-07 NOTE — PROGRESS NOTES
Physical Therapy  Co-Eval with OT   Initial Assessment / Treatment Note    Room Number: N7H-9140/4253-01  NAME: Alex Sandoval: Medical Record Number: [id number]  MRN: [id number]    ASSESSMENT: Stephen Blevins is an 80 y.o. F admit 5/7/19 with fall and resultant minimally displaced R proximal humeral fx -- ortho evaluated patient and recommended conservative care. CT of head, cervical spine, left hip negative acute. Prior to admit pt was living at home with her dtr and ambulating independently with a rolling walker. Today she required up to [facility]ist x 2 person for transfers to [place]. Pt was highly unsteady with loss of balance when she let go of the [employer] to re-position her left hand. She is functioning well below her baseline and clearly unsafe to return home at this time. Recommend skilled PT 3-5x/wk at discharge to optimize patient's functional mobility. Will follow. Discharge Recommendations: 3-5 sessions per week, Patient would benefit from continued therapy after discharge  Equipment Needs: Equipment Needed: No(defer to next level of care)    Date of Service: 05/07/19       Patient Diagnosis(es): The primary encounter diagnosis was Fall, initial encounter. Diagnoses of Fracture of humeral head, closed, right, initial encounter, Permanent atrial fibrillation ([address] and Gait abnormality were also pertinent to this visit. Past Medical History:  has a past medical history of Atrial fibrillation ([address] C. difficile colitis, Cancer ([address] Cardiomyopathy ([address] Chemotherapy, CHF (congestive heart failure) ([address] COPD (chronic obstructive pulmonary disease) ([address] Dependence on supplemental oxygen, Hypertension, Hypothyroidism, Osteoarthritis, and Rosacea. Past Surgical History:  has a past surgical history that includes Breast surgery; parathyroidectomy; Foot surgery; pr left heart cath,percutaneous; Mohs surgery (7/2012); Hip Arthroplasty (Right, 09/12/2017); Bunionectomy (Right);  Colonoscopy (2011); Eye surgery; and Cataract removal (Bilateral). Restrictions  Restrictions/Precautions: Fall Risk, Weight Bearing     Right Upper Extremity Weight Bearing: Non Weight Bearing(Presumed NWB R UE. ..how long? Sling and swath in place. No orders as of 5/7/19)       Vision/Hearing  Vision: Impaired  Vision Exceptions: Wears glasses for reading  Hearing: Exceptions to [facility]  Hearing Exceptions: Hard of hearing/hearing concerns, Bilateral hearing aid(has hearing aids, but does not wear)    SUBJECTIVE:  Chart Reviewed: Yes  Patient assessed for rehabilitation services?: Yes  Additional Pertinent Hx: Jeanna Armijo is an 80 y.o. F admit 5/7/19 with fall and resultant minimally displaced R proximal humeral fx -- ortho evaluated patient and recommended conservative care. CT of head, cervical spine, left hip negative acute. Referring Practitioner: Merlin Decant, MD  Diagnosis: Fall, right humeral fx     Subjective: Pt supine in bed resting; patient anxious about getting up and initially declining. Pt agreeable to get up to toilet with encouragement. 9/10 R shoulder pain.   Patient Currently in Pain: Yes  Pain Assessment: 0-10  Pain Level: 9  Patient's Stated Pain Goal: No pain  Pain Type: Acute pain  Pain Location: Shoulder  Pain Orientation: Right  Pain Descriptors: Constant  Pain Frequency: Continuous  Pain Onset: Sudden  Functional Pain Assessment: Intolerable, unable to do any active or passive activities  Non-Pharmaceutical Pain Intervention(s): Distraction, Rest     Orientation  Overall Orientation Status: Within Functional Limits    Social/Functional History  Social/Functional History  Lives With: Daughter(dtr Tere who does not work -- disability with back issues)  Type of Home: House  Home Layout: Able to Live on Main level with bedroom/bathroom  Home Access: Stairs to enter without rails, Ramped entrance  Entrance Stairs - Number of Steps: ramp to front; 1 step in through garage  Bathroom Shower/Tub: Tub/Shower unit  Bathroom Toilet: Handicap height(3-in-1 commode over high toilet)  Bathroom Equipment: Grab bars in shower, Shower chair, Hand-held shower  Bathroom Accessibility: Accessible  Home Equipment: Rolling walker, 4 wheeled walker, [employer], Lift chair, Oxygen(transport chair, home O2 @2L/min)  ADL Assistance: Needs assistance(Dtr assists in/out shower. Pt dresses independently)  Homemaking Assistance: Needs assistance(Dtr, HHA (1x/wk) take care of homemaking needs)  Ambulation Assistance: Independent(RW)  Transfer Assistance: Independent  Active : No  Patient's  Info: dgt  Additional Comments: Sleeps in lift chair; has home health nurse coming over. OBJECTIVE:  OBSERVATIONS  Observation: bruising over prabhjot anterior knees    ROM  RLE PROM: WFL     LLE PROM: WFL       STRENGTH  Strength RLE: Exception  Comment: not formally examined but pt with clear hip/knee extension weakness with transfers     Strength LLE: Exception  Comment: not formally examined but pt with clear hip/knee extension weakness with transfers       Bed mobility  Supine to Sit: Maximum assistance, 2 Person assistance(HOB up)  Sit to Supine: Maximum assistance, 2 Person assistance(HOB up)  Scooting: Dependent/Total    Transfers  Sit to Stand: Maximum Assistance, Moderate Assistance, 2 Person Assistance(to SaraStedy, NWB R UE)  Stand to sit: Maximum Assistance, 2 Person Assistance(SaraStedy, NWB R UE)    Ambulation  Ambulation  Ambulation?: No(non-ambulatory)     Balance  Balance  Sitting - Static: Good  Standing - Static: Poor(unsteady, patient impulsively let go of [employer] to reach for grab bar at toilet, lost balance and required total assist to recover)    Other Activities: Pt assisted to the toilet to empty her bladder. See OT note for toileting details. Treatment included transfer training, gait training, and patient education.      This note also serves as a D/C Summary in the event that this patient is discharged prior to the next therapy session. Please refer to last PT note for goal status, discharge recommendations and functional status. ASSESSMENT:   Assessment: Carlos Mercado is an 80 y.o. F admit 5/7/19 with fall and resultant minimally displaced R proximal humeral fx -- ortho evaluated patient and recommended conservative care. CT of head, cervical spine, left hip negative acute. Prior to admit pt was living at home with her dtr and ambulating independently with a rolling walker. Today she required up to [employer] Assist x 2 person for transfers to [place]. Pt was highly unsteady with loss of balance when she let go of the [employer] to re-position her left hand. She is functioning well below her baseline and clearly unsafe to return home at this time. Recommend skilled PT 3-5x/wk at discharge to optimize patient's functional mobility. Will follow. Treatment Diagnosis: Decreased activity tolerance, weakness, inability to ambulate  Prognosis: Good  Decision Making: Medium Complexity  History: Atrial fibrillation ([facility] [address] C. difficile colitis, Cancer ([facility] [address] Cardiomyopathy ([facility] [address] Chemotherapy, CHF (congestive heart failure) ([facility] [address] COPD (chronic obstructive pulmonary disease) ([facility] [address] Dependence on supplemental oxygen, Hypertension, Hypothyroidism, Osteoarthritis, Rosacea. Exam: Decreased activity tolerance, weakness, inability to ambulate  Clinical Presentation: Evolving activity tolerance  Patient Education: Role of PT, importance of getting out of bed. She verb understanding. Barriers to Learning: Hearing  REQUIRES PT FOLLOW UP: Yes  Discharge Recommendations: 3-5 sessions per week, Patient would benefit from continued therapy after discharge    Pamela Weaver scored a 9/24 on the AM-PAC short mobility form. Initial research suggests that an AM-PAC score of 18 or greater may be associated with a discharge to patient's home setting.  However in this initial research, cut off scores do not perfectly predict discharge location: 25% of patients with a score of 18 or greater actually went to a rehab facility and 20% of people with a score less than 18 actually went home. Based on my clinical judgement I recommend that the patient have 3-5 sessions per week of Physical Therapy at d/c to increase the patients independence. Safety devices:   Type of devices:  All fall risk precautions in place, Call light within reach, Chair alarm in place, Left in chair, Nurse notified, Patient at risk for falls        PLAN:  Times per week: 3-5x/wk while in the hospital;    Current Treatment Recommendations: Functional Mobility Training     OutComes Score  How much difficulty turning over in bed?: A Lot  How much difficulty sitting down on / standing up from a chair with arms?: A Lot  How much difficulty moving from lying on back to sitting on side of bed?: A Lot  How much help from another person moving to and from a bed to a chair?: Total  How much help from another person needed to walk in hospital room?: Total  How much help from another person for climbing 3-5 steps with a railing?: Total  AM-PAC Inpatient Mobility Raw Score : 9  AM-PAC Inpatient T-Scale Score : 30.55  Mobility Inpatient CMS 0-100% Score: 81.38  Mobility Inpatient CMS G-Code Modifier : CM       Goals  Patient Goals   Patient goals : to eventually get back home  Time Frame for Short term goals: upon d/c  Short term goal 1: sup<>sit Min Assist   Short term goal 2: sit<>stand Min Assist   Short term goal 3: bed<>chair stand-pivot transfer with Min Assist  Short term goal 4: Pt assessed for w/c mobility  Short term goal 5: Pt assessed for possible gait with uri-walker (does not appear to have sufficient balance for this)        Therapy Time    Individual Concurrent Group Co-treatment   Time In      1443   Time Out     1525   Minutes      42    Timed Code Treatment Minutes: 27 Minutes      Neal Deng, PT

## 2019-05-07 NOTE — CARE COORDINATION
Pt current with [facility] for PT/OT/SN services [id number]/ [id number] and receives HHA 2 hrs q week from [place] provided by [facility].  Both agencies informed of admission  Electronically signed by Dahiana Cole RN on 5/7/2019 at 3:08 PM

## 2019-05-07 NOTE — PROGRESS NOTES
[facility] Orthopedic Surgery  Consult Note          Orthopedic Consult, full note to follow. Gregoriofrancisco javier Cindi 80 y.o. well known to me admitted for a fall with right proximal humerus fracture. Bolivar Morgan reviewed, and showed a minimally displaced right proximal humerus fracture. Plan:  - Recommend non surgical treatment. Thank you very much for the kind consultation and allowing me to participate in this patient's care. I will continue to keep you apprised of her progress.            Mark Saldivar MD 5/7/2019 11:25 AM

## 2019-05-07 NOTE — ED NOTES
Patient is a very hard stick. IV attempted x 3. Blood work obtained and sent to lab. Charge RN notified of IV status. Charge will attempt IV with ultrasound.      Aurelio Hernandez RN  05/07/19 8520

## 2019-05-07 NOTE — PROGRESS NOTES
Occupational Therapy   Occupational Therapy Initial Assessment  Date: 2019   Patient Name: Diego Gray  MRN: [id number]     : 3/11/1930    Date of Service: 2019    Discharge Recommendations:  Patient would benefit from continued therapy after discharge, 3-5 sessions per week     Diego Gray scored a  on the AM-PAC ADL Inpatient form. Current research shows that an AM-PAC score of 17 or less is typically not associated with a discharge to the patient's home setting. Based on the patients AM-PAC score and their current ADL deficits, it is recommended that the patient have 3-5 sessions per week of Occupational Therapy at d/c to increase the patients independence. Assessment   Performance deficits / Impairments: Decreased functional mobility ; Decreased ADL status; Decreased ROM; Decreased endurance;Decreased balance;Decreased strength;Decreased safe awareness;Decreased fine motor control  Assessment: Pt is a 80 y.o. Female admitted with fall, right humerus fracture. At baseline, pt lives with daughter who assists prn with ADLs (bathing), mod I fxl mobility using walker. Pt currently functioning below baseline, limited by pain/impaired ROM/strength/function R UE, decreased balance/fxl mobility, and decreased fxl activity tolerance. This date, pt needed assist x2 for bed mobility, fxl transfers using lift equipment (bryce stedy), and toileting. Anticipate pt would require overall max A for ADLs. Will cont to see on acute to address the above limitations and maximize pt's independence. Pt's AM-PAC score indicates need for ongoing skilled OT at d/c.    Prognosis: Good  Decision Making: Medium Complexity  History: see above  Exam: decreased ADL status, strength/ROM/function R UE, balance/fxl mobility, safety judgement, memory, endurance  Assistance / Modification: anticipate overall max A for ADLs  Patient Education: Pt educated on the role of OT, POC, safety/transfers, R UE restrictions, fall, right humerus fracture  Subjective  Subjective: Pt met b/s for OT eval/tx with PT. Pt supine in bed on arrival, intially refusing OOB d/t pain, but with verbal encouragement agreeable to participate in therapy to attempt to use toilet. Pt reports 9/10 R shoulder pain. Social/Functional History  Social/Functional History  Lives With: Daughter(dtr Tere who does not work -- disability with back issues)  Type of Home: House  Home Layout: Able to Live on Main level with bedroom/bathroom  Home Access: Stairs to enter without rails, Ramped entrance  Entrance Stairs - Number of Steps: ramp to front; 1 step in through garage  Bathroom Shower/Tub: Tub/Shower unit  [employer]: Handicap height(3-in-1 commode over high toilet)  Bathroom Equipment: Grab bars in shower, Shower chair, Hand-held shower  Bathroom Accessibility: Accessible  Home Equipment: Rolling walker, 4 wheeled walker, [address] chair, Oxygen(transport chair, home O2 @2L/min)  ADL Assistance: Needs assistance(Dtr assists in/out shower. Pt dresses independently)  Homemaking Assistance: Needs assistance(Dtr, HHA (1x/wk) take care of homemaking needs)  Ambulation Assistance: Independent(RW)  Transfer Assistance: Independent  Active : No  Patient's  Info: dgt  Additional Comments: Sleeps in lift chair; has home health nurse coming over.        Objective   Vision: Impaired  Vision Exceptions: Wears glasses for reading  Hearing: Exceptions to [facility]  Hearing Exceptions: Hard of hearing/hearing concerns;Bilateral hearing aid(has hearing aids, but does not wear)      Observation/Palpation  Observation: bruising over prabhjot anterior knees     Balance  Sitting Balance: Stand by assistance(seated EOB)  Standing Balance: Dependent/Total(standing in bryce stedy with variable assist from CGA to max A d/t 2 LOB)  Functional Mobility  Functional Mobility Comments: total A via bryce stedy bed-BR-bed    ADL  Toileting: Dependent/Total(to void, total A for pericare managing depends with use of bryce stedy and assist x2)  Additional Comments: Anticipate pt is min A feeding, grooming, max A UB dressing/bathing, total A LB dressing/bathing based on ROM/strength, balance, endurance. Coordination  Movements Are Fluid And Coordinated: No  Coordination and Movement description: Fine motor impairments;Right UE;Gross motor impairments  Quality of Movement Other  Comment: Pt is R handed, R UE limited by pain/restrictions     Bed mobility  Supine to Sit: Maximum assistance;2 Person assistance(HOB up)  Sit to Supine: Maximum assistance;2 Person assistance(HOB up)  Scooting: Dependent/Total     Transfers  Sit to stand: 2 Person assistance; Moderate assistance;Maximum assistance(mod A x2 EOB>bryce stedy, max A x2 toilet>bryce stedy, NWB R UE)  Stand to sit: 2 Person assistance; Moderate assistance;Maximum assistance(mod A x2 bryce stedy>EOB, max A x2 bryce stedy>toilet, NWB R UE)  Transfer Comments: total A via bryce stedy bed-BR-bed   Toilet Transfers  Toilet Transfer: Maximum assistance  Toilet Transfers Comments: max A x2 bryce stedy>toilet, toilet>bryce stedy    Vision - Basic Assessment  Prior Vision: Wears glasses only for reading     Cognition  Overall Cognitive Status: Exceptions  Memory: Decreased short term memory  Safety Judgement: Decreased awareness of need for safety  Problem Solving: Decreased awareness of errors;Assistance required to identify errors made;Assistance required to generate solutions  Insights: Decreased awareness of deficits     LUE AROM (degrees)  LUE AROM : WFL  RUE AROM (degrees)  RUE AROM : Exceptions  RUE General AROM:  right proximal humerus fracture and R UE in sling and swath, digits grossly WFL      LUE Strength  LUE Strength Comment: not formally assessed  RUE Strength  RUE Strength Comment: NWB R UE                   Plan   Plan  Times per week: 3-5  Times per day: Daily  Current Treatment Recommendations: ROM, Balance Training, Functional Mobility Training, Endurance Training, Strengthening, Self-Care / ADL, Safety Education & Training    OutComes Score    How much help for putting on and taking off regular lower body clothing?: Total  How much help for Bathing?: A Lot  How much help for Toileting?: Total  How much help for putting on and taking off regular upper body clothing?: Total  How much help for taking care of personal grooming?: A Little  How much help for eating meals?: A Little  AM-PAC Inpatient Daily Activity Raw Score: 11  AM-PAC Inpatient ADL T-Scale Score : 29.04  ADL Inpatient CMS 0-100% Score: 70.42  ADL Inpatient CMS G-Code Modifier : CL                                               AM-PAC Score        AM-PAC Inpatient Daily Activity Raw Score: 11  AM-PAC Inpatient ADL T-Scale Score : 29.04  ADL Inpatient CMS 0-100% Score: 70.42  ADL Inpatient CMS G-Code Modifier : CL    Goals  Short term goals  Time Frame for Short term goals: Prior to d/c:  Short term goal 1: Pt will complete UB bathing/dressing with mod A. Short term goal 2: Pt will toilet with max A. Short term goal 3: Pt will complete grooming with set-up/SBA. Short term goal 4: Pt will complete fxl transfer to/from ADL surfaces with min A. Short term goal 5: Pt will increase activity tolerance to achieve the above goals. Long term goals  Time Frame for Long term goals : STG=LTG   Patient Goals   Patient goals : to eventually return home. Therapy Time   Individual Concurrent Group Co-treatment   Time In       1443   Time Out       1525   Minutes       42   Timed Code Treatment Minutes: 27 Minutes     This note to serve as OT d/c summary if pt is d/c-ed prior to next therapy session.     Darrel Lopes, OTR/L [id number]

## 2019-05-08 NOTE — PROGRESS NOTES
Diana Luna MD  5/8/2019,   Margot Duncan MD    3/11/1930  Chief Complaint   Patient presents with    Fall     Patient states she got up this morning to go to the bathroom and adjust the heat and fell over her walker. Patient c/o right shoulder and right hip pain. Active Problems:    Atrial fibrillation with RVR (HCC)    Closed fracture of right proximal humerus  Resolved Problems:    * No resolved hospital problems. *     has a past medical history of Atrial fibrillation ([address] C. difficile colitis, Cancer ([address] Cardiomyopathy ([address] Chemotherapy, CHF (congestive heart failure) ([address] COPD (chronic obstructive pulmonary disease) ([address] Dependence on supplemental oxygen, Hypertension, Hypothyroidism, Osteoarthritis, and Rosacea. Past Surgical History:     has a past surgical history that includes Breast surgery; parathyroidectomy; Foot surgery; pr left heart cath,percutaneous; Mohs surgery (7/2012); Hip Arthroplasty (Right, 09/12/2017); Bunionectomy (Right); Colonoscopy (2011); Eye surgery; and Cataract removal (Bilateral). ED REVIEW:    Signed                 Show:Clear all  [x]Manual[x]Template[]Copied    Added by:  [x]Cristy Rey RN      []Edy for details      Patient states she fell this morning. Patient denies loc. Patient does not remember if she hit her head. Patient c/o right shoulder,elbow and arm pain and left hip pain. Skin is warm and dry distal to injuries on right arm and right leg. Patient has strong radial pulse and strong pedal pulses. Cap refill less than 3. Patient has deformity to right shoulder. Patient is able to wiggle fingers but is unable to move right arm. Patient is able to bend knee on right side.      Oskar Carver RN  05/07/19 1656                   CURRENT STATUS:  PHYSICIANS CERTIFICATION:     I certify that Renate Land is expected to be hospitalized for more than 2 midnights based on the following assessment and

## 2019-05-08 NOTE — CONSULTS
[facility] Orthopedic Surgery  Consult Note        This patient is seen in consultation at the request of Dr Nico Stacy MD    Reason for Consult:  Right shoulder and hip pain/ minimally displaced proximal humerus fracture. CHIEF COMPLAINT:  Right shoulder and hip pain/ fall. History Obtained From:  patient, electronic medical record    HISTORY OF PRESENT ILLNESS:    Ms. Jocy Chavez is a 80 y.o.  female right handed who presented 5/7/2019 to [facility] ED via EMS for evaluation of a right shoulder and hip injury. The patient reports that this injury occurred when she fell. She was on her way to another room when she decided to turn and look at the TV to see what the temp would be when she fell over landing on her right side. She was first seen and evaluated in [facility] ED, when she was x-rayed and I was consulted. The patient denies any other injuries. Rates pain a 6/10 VAS moderate, sharp, intermittent and show no change. Alleviating factors rest. Movement makes the pain worse, the sling and resting makes the pain better. No numbness or tingling sensation. The patient is known to me for right femoral neck displaced fracture, status post Press-Fit bipolar hemiarthroplasty, 9/10/2017.       Past Medical History:        Diagnosis Date    Atrial fibrillation ([address]     C. difficile colitis     history of/  negative specimen 4/12/2018    Cancer ([address]     breast left, skin     Cardiomyopathy ([address]     Chemotherapy     CHF (congestive heart failure) (HCC)     COPD (chronic obstructive pulmonary disease) (HCC)     Dependence on supplemental oxygen     2LNC    Hypertension     Hypothyroidism     Osteoarthritis     Rosacea        Past Surgical History:        Procedure Laterality Date    BREAST SURGERY      Left breast lumpectomy    BUNIONECTOMY Right     CATARACT REMOVAL Bilateral     COLONOSCOPY  2011    EYE SURGERY      FOOT SURGERY      toe    HIP ARTHROPLASTY Right 09/12/2017    right bipolar hip    MOHS SURGERY  7/2012    PARATHYROIDECTOMY      MO LEFT HEART CATH,PERCUTANEOUS      Cardiac Cath, Left Heart       Medications prior to admission:   Prior to Admission medications    Medication Sig Start Date End Date Taking?  Authorizing Provider   torsemide (DEMADEX) 20 MG tablet Take 20 mg by mouth four times a week TUE/THUR/SAT/SUN   Yes Historical Provider, MD   torsemide (DEMADEX) 20 MG tablet Take 40 mg by mouth three times a week 40 MG mon/wed/fri   Yes Historical Provider, MD   lisinopril (PRINIVIL;ZESTRIL) 5 MG tablet Take 1 tablet by mouth daily 3/26/19  Yes Reuben Hemp, APRN - CNP   midodrine (PROAMATINE) 5 MG tablet Take 1 tablet by mouth 3 times daily (with meals) 12/1/18  Yes Michael Mackenzie MD   methylcellulose (CITRUCEL) 500 MG TABS Take 2 tablets by mouth daily   Yes Historical Provider, MD   esomeprazole (NEXIUM) 20 MG delayed release capsule Take 20 mg by mouth Daily with supper   Yes Historical Provider, MD   diclofenac sodium 1 % GEL Apply 2 g topically 4 times daily as needed for Pain (Back and left hifp)    Yes Historical Provider, MD   tiotropium (SPIRIVA RESPIMAT) 1.25 MCG/ACT AERS inhaler Inhale 2 puffs into the lungs daily 9/20/18  Yes Arabella Zafar MD   albuterol sulfate HFA (PROAIR HFA) 108 (90 Base) MCG/ACT inhaler Inhale 2 puffs into the lungs every 4 hours as needed for Wheezing or Shortness of Breath 9/20/18  Yes Arabella Zafar MD   docusate sodium (COLACE) 100 MG capsule Take 100 mg by mouth daily   Yes Historical Provider, MD   Probiotic Product (PROBIOTIC DAILY PO) Take 1 tablet by mouth daily as needed (antibiotic use)    Yes Historical Provider, MD   Misc Natural Products (OSTEO BI-FLEX JOINT SHIELD) TABS Take 1 tablet by mouth daily   Yes Historical Provider, MD   BOSWELLIA MESFIN PO Take 1 tablet by mouth daily    Yes Historical Provider, MD   COLLAGEN PO Take 10 mg by mouth daily   Yes Historical Provider, MD   levothyroxine (SYNTHROID) 100 MCG tablet Take 1 tablet by mouth Daily 4/17/18  Yes Michael De Dios MD   guaiFENesin (MUCINEX) 600 MG extended release tablet Take 600 mg by mouth 2 times daily as needed for Congestion    Yes Historical Provider, MD   fluticasone-salmeterol (ADVAIR HFA) 115-21 MCG/ACT inhaler Inhale 2 puffs into the lungs 2 times daily   Yes Historical Provider, MD   Calcium Carb-Cholecalciferol (CALCIUM-VITAMIN D) 600-400 MG-UNIT TABS Take 1 tablet by mouth daily   Yes Historical Provider, MD   metroNIDAZOLE (METROGEL) 0.75 % gel Apply topically 2 times daily Apply to face 2 times daily. Yes Historical Provider, MD   vitamin D (CHOLECALCIFEROL) 1000 units TABS tablet Take 1,000 Units by mouth daily    Yes Historical Provider, MD   HYDROcodone-acetaminophen (NORCO) 5-325 MG per tablet Take 1 tablet by mouth every 8 hours as needed for Pain.  .   Yes Historical Provider, MD   OXYGEN 2 L/min continuous    Yes Historical Provider, MD   Multiple Vitamins-Minerals (MULTI FOR HER 50+ PO) Take 1 tablet by mouth daily    Yes Historical Provider, MD       Current Medications:   Current Facility-Administered Medications: albuterol sulfate  (90 Base) MCG/ACT inhaler 2 puff, 2 puff, Inhalation, Q4H PRN  oyster shell calcium/vitamin D 250-125 MG-UNIT per tablet 250 mg, 1 tablet, Oral, Daily  diclofenac sodium 1 % gel 2 g, 2 g, Topical, 4x Daily PRN  docusate sodium (COLACE) capsule 100 mg, 100 mg, Oral, Daily  pantoprazole (PROTONIX) tablet 40 mg, 40 mg, Oral, QAM AC  mometasone-formoterol (DULERA) 100-5 MCG/ACT inhaler 2 puff, 2 puff, Inhalation, BID  guaiFENesin (MUCINEX) extended release tablet 600 mg, 600 mg, Oral, BID PRN  levothyroxine (SYNTHROID) tablet 100 mcg, 100 mcg, Oral, Daily  lisinopril (PRINIVIL;ZESTRIL) tablet 5 mg, 5 mg, Oral, Daily  midodrine (PROAMATINE) tablet 5 mg, 5 mg, Oral, TID WC  torsemide (DEMADEX) tablet 20 mg, 20 mg, Oral, Once per day on Sun Tue Thu Sat  torsemide [facility]) tablet 40 mg, 40 mg, Oral, Once per day on   vitamin D (CHOLECALCIFEROL) tablet 1,000 Units, 1,000 Units, Oral, Daily  sodium chloride flush 0.9 % injection 10 mL, 10 mL, Intravenous, 2 times per day  sodium chloride flush 0.9 % injection 10 mL, 10 mL, Intravenous, PRN  ondansetron (ZOFRAN) injection 4 mg, 4 mg, Intravenous, Q6H PRN  acetaminophen (TYLENOL) tablet 650 mg, 650 mg, Oral, Q4H PRN  tiotropium (SPIRIVA) inhalation capsule 18 mcg, 18 mcg, Inhalation, Daily  enoxaparin (LOVENOX) injection 30 mg, 30 mg, Subcutaneous, Daily  HYDROcodone-acetaminophen (NORCO) 5-325 MG per tablet 1 tablet, 1 tablet, Oral, Q4H PRN    Allergies:  Adhesive tape    Social History     Socioeconomic History    Marital status:       Spouse name: Not on file    Number of children: Not on file    Years of education: Not on file    Highest education level: Not on file   Occupational History    Occupation: retired   Social Needs    Financial resource strain: Not on file    Food insecurity:     Worry: Not on file     Inability: Not on file   Buzz Referrals needs:     Medical: Not on file     Non-medical: Not on file   Tobacco Use    Smoking status: Former Smoker     Types: Cigarettes     Last attempt to quit: 1980     Years since quittin.4    Smokeless tobacco: Never Used   Substance and Sexual Activity    Alcohol use: No    Drug use: No    Sexual activity: Not Currently   Lifestyle    Physical activity:     Days per week: Not on file     Minutes per session: Not on file    Stress: Not on file   Relationships    Social connections:     Talks on phone: Not on file     Gets together: Not on file     Attends [employer] service: Not on file     Active member of club or organization: Not on file     Attends meetings of clubs or organizations: Not on file     Relationship status: Not on file    Intimate partner violence:     Fear of current or ex partner: Not on file     Emotionally abused: Not on file     Physically abused: Not on Hearing intact, pupil equal and reactive bilateral  Lymphatics; No groin or axillary enlarged lymph nodes. Neck; No swelling  Abdomen; soft, non distended. MUSCULOSKELETAL:   On evaluation of her bilateral upper extremity, there is no obvious deformity right shoulder. There is minimal swelling and minimal ecchymosis. She is tender to palpation over the shoulder, and otherwise non tender over the remainder of the extremity. Range of motion is decreased secondary to pain over the right shoulder. The skin overlying the right shoulder is intact without evidence of lesion, laceration. Distal pulses are 2+ and symmetric bilaterally. Sensation is grossly intact to light touch and symmetric bilaterally. The incision is completely healed right hip. No signs of any erythema or drainage. She has minimal to no pain with the active or passive range of motion of the right hip. She has intact sensation, distally, and she is neurovascularly intact. NEUROLOGIC:   Sensory:    Touch:                     Right Upper Extremity:  normal                   Left Upper Extremity:  normal                  Right Lower Extremity:  normal                  Left Lower Extremity:  normal        DATA:    CBC:   Lab Results   Component Value Date    WBC 3.9 05/07/2019    RBC 3.69 05/07/2019    HGB 10.9 05/07/2019    HCT 33.4 05/07/2019    MCV 90.6 05/07/2019    MCH 29.4 05/07/2019    MCHC 32.5 05/07/2019    RDW 17.5 05/07/2019     05/07/2019    MPV 8.2 05/07/2019     WBC:    Lab Results   Component Value Date    WBC 3.9 05/07/2019     PT/INR:    Lab Results   Component Value Date    PROTIME 10.7 05/07/2019    INR 0.94 05/07/2019     PTT:    Lab Results   Component Value Date    APTT 31.8 03/15/2019   [APTT    IMAGING: Xrays dated 5/7/2019, 3 views of right shoulder were reviewed, and showed minimally displaced proximal humerus fracture.     X-rays were taken in the ED 5/7/2019, AP pelvis and 2 views of the right hip and femur, and showed the Press-Fit hemiarthroplasty in good position. No signs of any lucency or fracture. IMPRESSION: Right shoulder and hip pain/ minimally displaced proximal humerus fracture, hip contusion. PLAN:  I discussed with Leni Gracie the treatment options and that the overall alignment of this fracture is good and we can try to treat this non-operatively in a sling, NWB right shoulder for 6 weeks. We discussed the risk of nonunion and or malunion. We applied a sling in the ED and instructed her  in care. We will see her  back in 6 weeks at which time we will get a new xray of the right shoulder. I assured the patient that the right hip xray is negative for acute fracture. Thank you very much for the kind consultation and allowing me to participate in this patient's care. I will continue to keep you apprised of her progress.         Noel Hewitt MD   5/8/2019  7:03 AM

## 2019-05-08 NOTE — PROGRESS NOTES
Physical Therapy  Co-Treat with ST[facility]  Daily Treatment Note    Patient Name: Amalia Mejia  Medical Record Number: [id number]  Room Number: T2K-2067/5869-42    ASSESSMENT: Leighann Grace is an 80 y.o. F admit 5/7/19 with fall and resultant minimally displaced R proximal humeral fx -- ortho evaluated patient and recommended conservative care. CT of head, cervical spine, left hip negative acute. Prior to admit pt was living at home with her dtr and ambulating independently with a rolling walker. Today she required up to Mod Assist x 2 person for transfers to uri-walker. She was able to ambulate 5' with a uri-walker and Max Assist x 2 person. She desaturated to 78% walking with 2L/min oxygen saturation (required 2-3 minutes of pursed lip breathing to come up to 90%). She is functioning well below her baseline and clearly unsafe to return home at this time. Recommend skilled PT 3-5x/wk at discharge to optimize patient's functional mobility. Will follow. Discharge Recommendations: 3-5 sessions per week, Patient would benefit from continued therapy after discharge  Equipment Needs: Equipment Needed: No(defer to next level of care)    Admission Date: 5/7/2019  8:02 AM    Additional Pertinent Hx: Leighann Grace is an 80 y.o. F admit 5/7/19 with fall and resultant minimally displaced R proximal humeral fx -- ortho evaluated patient and recommended conservative care. CT of head, cervical spine, left hip negative acute. Diagnosis: Fall, right humeral fx  Restrictions/Precautions: Fall Risk, Weight Bearing, NWB R UE x 6 weeks       PMHx:  has a past medical history of Atrial fibrillation ([address] C. difficile colitis, Cancer ([facility] [address] Cardiomyopathy ([facility] [address] Chemotherapy, CHF (congestive heart failure) ([address] COPD (chronic obstructive pulmonary disease) ([address] Dependence on supplemental oxygen, Hypertension, Hypothyroidism, Osteoarthritis, and Rosacea.   PSgHx:   Past Surgical History:   Procedure Laterality Date    BREAST SURGERY Left breast lumpectomy    BUNIONECTOMY Right     CATARACT REMOVAL Bilateral     COLONOSCOPY  2011    EYE SURGERY      FOOT SURGERY      toe    HIP ARTHROPLASTY Right 09/12/2017    right bipolar hip    MOHS SURGERY  7/2012    PARATHYROIDECTOMY      MT LEFT HEART CATH,PERCUTANEOUS      Cardiac Cath, Left Heart       Home Environment / Functional History  Social/Functional History  Lives With: Daughter(dtr Tere who does not work -- disability with back issues)  Type of Home: House  Home Layout: Able to Live on Main level with bedroom/bathroom  Home Access: Stairs to enter without rails, Ramped entrance  Entrance Stairs - Number of Steps: ramp to front; 1 step in through garage  Bathroom Shower/Tub: Tub/Shower unit  [employer]: Handicap height(3-in-1 commode over high toilet)  Bathroom Equipment: Grab bars in shower, Shower chair, Hand-held shower  Bathroom Accessibility: Accessible  Home Equipment: Rolling walker, 4 wheeled walker, [address] chair, Oxygen(transport chair, home O2 @2L/min)  ADL Assistance: Needs assistance(Dtr assists in/out shower. Pt dresses independently)  Homemaking Assistance: Needs assistance(Dtr, HHA (1x/wk) take care of homemaking needs)  Ambulation Assistance: Independent(RW)  Transfer Assistance: Independent  Active : No  Patient's  Info: dgt  Additional Comments: Sleeps in lift chair; has home health nurse coming over. Restrictions  Restrictions/Precautions: Fall Risk, Weight Bearing     Right Upper Extremity Weight Bearing: Non Weight Bearing(6 wks)       SUBJECTIVE: Pt supine in bed resting. Pt agreeable to get up without encouragement. Pt c/o right shoulder pain unrated.     Pain: Patient Currently in Pain: No      OBJECTIVE:    Bed mobility  Supine to Sit: Minimal assistance(HOB up, use of bed rail)  Sit to Supine: Unable to assess(pt up in chair)  Scooting: Maximal assistance, Dependent/Total(at edge of bed, dependant for scooting in chair)    Transfers  Sit to Stand: Moderate Assistance(L UE support only, from bed, post and right wt shift)  Stand to sit: Moderate Assistance(to chair)    Ambulation  Ambulation  Ambulation?: Yes  Ambulation 1  Device: Hemiwalker(on left)  Other Apparatus: O2(2L/min via NC)  Assistance: Moderate assistance, 2 Person assistance, Maximum assistance  Quality of Gait: highly unsteady, posterior and right wt shift, needing assist to advance uri-walker, able to take short steps   Distance: 5'  Comments: SpO2 dropped to 78% after walking and required ~2-3 minutes of seated pursed lip breathing to come up to 90%    Exercises   Exercises  Comments: SUPINE: AP x 10 reps, heel slide x 10 reps. Neuro/balance  Balance  Sitting - Static: Good  Standing - Static: Fair, -(Min Assist x 2 person standing reaching with left hand)      Patient Education: Role of PT, importance of getting out of bed. She verb understanding. Safety Devices: Type of devices: All fall risk precautions in place, Call light within reach, Chair alarm in place, Left in chair, Nurse notified, Patient at risk for falls      ASSESSMENT:   Felton Lr is an 80 y.o. F admit 5/7/19 with fall and resultant minimally displaced R proximal humeral fx -- ortho evaluated patient and recommended conservative care. CT of head, cervical spine, left hip negative acute. Prior to admit pt was living at home with her dtr and ambulating independently with a rolling walker. Today she required up to Mod Assist x 2 person for transfers to uri-walker. She was able to ambulate 5' with a uri-walker and Max Assist x 2 person. She desaturated to 78% walking with 2L/min oxygen saturation (required 2-3 minutes of pursed lip breathing to come up to 90%). She is functioning well below her baseline and clearly unsafe to return home at this time. Recommend skilled PT 3-5x/wk at discharge to optimize patient's functional mobility. Will follow.     Marden Galeazzi scored a 12/24 on the AM-PAC short mobility form. Initial research suggests that an AM-PAC score of 18 or greater may be associated with a discharge to patient's home setting. However in this initial research, cut off scores do not perfectly predict discharge location: 25% of patients with a score of 18 or greater actually went to a rehab facility and 20% of people with a score less than 18 actually went home. Based on my clinical judgement I recommend that the patient have 3-5 sessions per week of Physical Therapy at d/c to increase the patients independence. Goals:  Time Frame for Short term goals: upon d/c - all goals ongoing 05/08/19 except as noted below  Short term goal 1: sup<>sit Min Assist   Short term goal 2: sit<>stand Min Assist   Short term goal 3: bed<>chair stand-pivot transfer with Min Assist  Short term goal 4: Pt assessed for w/c mobility  Short term goal 5: Pt assessed for possible gait with uri-walker (does not appear to have sufficient balance for this)      Safety devices:   Type of devices: All fall risk precautions in place, Call light within reach, Chair alarm in place, Left in chair, Nurse notified, Patient at risk for falls        PLAN:  Times per week: 3-5x/wk while in the hospital;    Current Treatment Recommendations: Functional Mobility Training    If patient is discharged prior to next treatment, this note will serve as the discharge summary.     Therapy Time    Individual Concurrent Group Co-treatment   Time In        0940   Time Out      1025   Minutes        45    Timed Code Treatment Minutes: [address], PT

## 2019-05-08 NOTE — PROGRESS NOTES
D: pt has a hx of bowel obstruction, pt has some abdominal distention, no pain at this time, last bm two days ago, stool softener given this morning, daughter at bedside said that pt takes a stool softener and laxative combination (senna) A: this RN sent secure message to Dr. Mode Choi R: will continue to monitor pt

## 2019-05-08 NOTE — CARE COORDINATION
SW met with patient to discuss therapy recs of SNF. She reports she is interested in [facility] as she has been there before. Referral made via Epic. Spoke with [facility] ([phone]). She reports they are currently full and have no beds available. Spoke with MD, and likely dc Friday. Informed Karina of this. Will review and determine if able to accept. List of additional facilities provided to patient. She reports she will have her dtg review as she is not familiar with any. Advised to review Medicare. gov for closest to location and quality ratings. Offered to call dtg to inform but patient denied need. Contact info left. Electronically signed by CHELSY Saldivar on 5/8/2019 at 10:27 AM        Call to [facility] for update regarding referral.  She reports she is waiting to determine if bed will be available. Will likely have more information tomorrow and will return call to MARTHA.     Electronically signed by CHELSY Saldivar on 5/8/2019 at 3:30 PM

## 2019-05-08 NOTE — PROGRESS NOTES
Pt has a loss of iv access, status is DNR-CC and pt is currently not receiving any medication through her iv, this RN will attempt to regain iv access during this shift and discuss plan of care with physician

## 2019-05-08 NOTE — PROGRESS NOTES
Occupational Therapy  Facility/Department: [address] MED SURG  Daily Treatment Note  Let this serve as discharge note if patient is discharged prior to next OT session  NAME: Samson Kim  : 3/11/1930  MRN: [id number]    Date of Service: 2019    Discharge Recommendations:  3-5 sessions per week    AM-PAC  Samson Kim scored a 10/24 on the AM-PAC ADL Inpatient form. Current research shows that an AM-PAC score of 17 or less is typically not associated with a discharge to the patient's home setting. Based on the patients AM-PAC score and their current ADL deficits, it is recommended that the patient have 3-5 sessions per week of Occupational Therapy at d/c to increase the patients independence. Assessment   Performance deficits / Impairments: Decreased functional mobility ; Decreased ADL status; Decreased ROM; Decreased endurance;Decreased balance;Decreased strength;Decreased safe awareness;Decreased fine motor control  Assessment: Patient is requiring assist of 2 for transfers and fxl mobility or the use of lift equipment. Patient fatigues quickly and 02 sats drop to 78% with very brief fxl mobility and in the 80s following brief stands. Needs continued therapy. Prognosis: Good  Patient Education: safe transfers, use of uri-walker, importance of OOB, right UE restrictions  REQUIRES OT FOLLOW UP: Yes  Activity Tolerance: Patient Tolerated treatment well;Patient limited by fatigue(02 sats drop to 78% following fxl mobility, after brief stands 02 sats drop into the 80s, increased rest break and PLB required to increase 02 sats )  Safety Devices in place: Yes  Type of devices: Call light within reach; Chair alarm in place; Left in chair;Gait belt;Nurse notified(LILIANA German notified )         Patient Diagnosis(es): The primary encounter diagnosis was Fall, initial encounter.  Diagnoses of Fracture of humeral head, closed, right, initial encounter, Permanent atrial fibrillation ([facility] [address] and Gait abnormality were also pertinent to this visit. has a past medical history of Atrial fibrillation ([facility] [address] C. difficile colitis, Cancer ([facility] [address] Cardiomyopathy ([address] Chemotherapy, CHF (congestive heart failure) ([address] COPD (chronic obstructive pulmonary disease) ([address] Dependence on supplemental oxygen, Hypertension, Hypothyroidism, Osteoarthritis, and Rosacea. has a past surgical history that includes Breast surgery; parathyroidectomy; Foot surgery; pr left heart cath,percutaneous; Mohs surgery (7/2012); Hip Arthroplasty (Right, 09/12/2017); Bunionectomy (Right); Colonoscopy (2011); Eye surgery; and Cataract removal (Bilateral). Restrictions  Restrictions/Precautions: Fall Risk, Weight Bearing  Right Upper Extremity Weight Bearing: Non Weight Bearing x 6 weeks per MD note on 5/8/19, Sling and swath in place    Subjective   Chart Reviewed: Yes  Patient assessed for rehabilitation services?: Yes  Additional Pertinent Hx: Pt is a 80 y.o. female presenting to [facility] with c/o fall, R shoulder pain. Pt found to have minimally displaced right proximal humerus fracture. Ortho consulted and recommend non-surgical surgical. R UE in sling in swathe, presumed NWB R UE.    Response to previous treatment: Patient reporting fatigue but able to participate  Family / Caregiver Present: No  Referring Practitioner: Cartriston Plater   Diagnosis: fall, right humerus fracture  Subjective: Patient met b/s as co-treat with PT, reports right UE pain, yet does not rate     Objective  ADL  Toileting: Dependent/Total(catheter urine )    Balance  Sitting Balance: Stand by assistance  Standing Balance: (Min A x 2 completing reaching tasks in standing, 02 sats drop into the 80s following very brief standing/reaching tasks 2x, increased time to recover)    Transfers  Sit to stand: (Mod A x 2 )  Stand to sit: (Mod A x 2 )    Functional Mobility  Functional - Mobility Device: Hemiwalker  Activity: Other  Assist Level: (Max A x 2 )  Functional Mobility Comments: Max A x 2 bed-chair with uri-walker, initially began with Mod A x2, yet increased to Max A x2, requires assist advancing walker    Bed mobility  Supine to Sit: Minimal assistance(HOB up, use of bed rail)  Sit to Supine: Unable to assess(pt up in chair)  Scooting: Maximal assistance;Dependent/Total(at edge of bed, dependant for scooting in chair)    Cognition  Overall Cognitive Status: Exceptions  Memory: Decreased short term memory  Safety Judgement: Decreased awareness of need for safety  Problem Solving: Decreased awareness of errors;Assistance required to identify errors made;Assistance required to generate solutions  Insights: Decreased awareness of deficits  Initiation: Requires cues for some  Sequencing: Requires cues for some    Plan   Times per week: 3-5  Times per day: Daily  Current Treatment Recommendations: ROM, Balance Training, Functional Mobility Training, Endurance Training, Strengthening, Self-Care / ADL, Safety Education & Training    Goals  Short term goals  Time Frame for Short term goals: Prior to d/c: status as of 5/8/19: goals ongoing   Short term goal 1: Pt will complete UB bathing/dressing with mod A. Short term goal 2: Pt will toilet with max A. Short term goal 3: Pt will complete grooming with set-up/SBA. Short term goal 4: Pt will complete fxl transfer to/from ADL surfaces with min A. Short term goal 5: Pt will increase activity tolerance to achieve the above goals. Long term goals  Time Frame for Long term goals : STG=LTG   Patient Goals   Patient goals : to eventually return home.         Therapy Time   Individual Concurrent Group Co-treatment   Time In       0940   Time Out       1025   Minutes       [address] [place], [address] [address]

## 2019-05-08 NOTE — PROGRESS NOTES
Pt alert and oriented. Sitting up in bed. Complains of pain 7/10 in right shoulder. Was given Norco as ordered per [facility]. Ice pack applied to right shoulder and right hip. VSS. Daughter at bed side. Call light within reach. Will continue to monitor.

## 2019-05-08 NOTE — PROGRESS NOTES
Hospitalist Progress Note      PCP: Leonarda Tim MD    Date of Admission: 5/7/2019    Chief Complaint: [facility] Course: The patient is a 80 y.o. female who presents to [facility] with mechanical fall. She was on her way to another room when she decided to turn and look at the TV to see what the temp would be when she fell over landing on her right side. Patient was then unable to get up so EMS was called. In ED patient was found to have fractured right humerus along with a fib with RVR. Patient will need to be admitted for further intervention and workup. Second day of admission patient developed acute on chronic resp failure with hypoxia and uncontrolled HR. Subjective: Patient seen and examined. Patient up and moving with PT/OT, oxygen saturations dropped to 70% on her home 2L. Patients HR also shot up to 120.        Medications:  Reviewed    Infusion Medications   Scheduled Medications    oyster shell calcium/vitamin D  1 tablet Oral Daily    docusate sodium  100 mg Oral Daily    pantoprazole  40 mg Oral QAM AC    mometasone-formoterol  2 puff Inhalation BID    levothyroxine  100 mcg Oral Daily    lisinopril  5 mg Oral Daily    midodrine  5 mg Oral TID WC    torsemide  20 mg Oral Once per day on Sun Tue Thu Sat    torsemide  40 mg Oral Once per day on Mon Wed Fri    vitamin D  1,000 Units Oral Daily    sodium chloride flush  10 mL Intravenous 2 times per day    tiotropium  18 mcg Inhalation Daily    enoxaparin  30 mg Subcutaneous Daily     PRN Meds: albuterol sulfate HFA, diclofenac sodium, guaiFENesin, sodium chloride flush, ondansetron, acetaminophen, HYDROcodone 5 mg - acetaminophen      Intake/Output Summary (Last 24 hours) at 5/8/2019 0826  Last data filed at 5/8/2019 0549  Gross per 24 hour   Intake 720 ml   Output 475 ml   Net 245 ml       Physical Exam Performed:    BP (!) 91/58   Pulse 110   Temp 98.4 °F (36.9 °C)   Resp 15   Ht 4' 11\" (1.499 m)   Wt 99 lb 3.3 oz (45 kg)   SpO2 (!) 88%   BMI 20.04 kg/m²     General appearance: No apparent distress, appears stated age and cooperative. HEENT: Pupils equal, round, and reactive to light. Conjunctivae/corneas clear. Neck: Supple, with full range of motion. No jugular venous distention. Trachea midline. Respiratory:  Normal respiratory effort. Diminished BS BL  Cardiovascular: irrgular rate and rhythm with normal S1/S2   Abdomen: Soft, non-tender, non-distended with normal bowel sounds. Musculoskeletal: R arm in slng  Skin: Skin color, texture, turgor normal.  No rashes or lesions. Neurologic:  Neurovascularly intact without any focal sensory/motor deficits. Cranial nerves: II-XII intact, grossly non-focal.  Psychiatric: Alert and oriented, thought content appropriate, normal insight  Capillary Refill: Brisk,< 3 seconds   Peripheral Pulses: +2 palpable, equal bilaterally       Labs:   Recent Labs     05/07/19  0835 05/08/19  0645   WBC 3.9* 4.6   HGB 10.9* 9.6*   HCT 33.4* 28.8*   * 122*     Recent Labs     05/07/19  1008 05/08/19  0644    140   K 5.4* 5.3*   CL 99 103   CO2 29 28   BUN 64* 55*   CREATININE 1.5* 1.5*   CALCIUM 9.7 9.7     Recent Labs     05/07/19  1008   AST 25   ALT 25   BILITOT <0.2   ALKPHOS 116     Recent Labs     05/07/19  0835   INR 0.94     Recent Labs     05/07/19  0835   TROPONINI <0.01       Urinalysis:      Lab Results   Component Value Date    NITRU Negative 11/23/2018    WBCUA 160 04/06/2018    BACTERIA 3+ 04/06/2018    RBCUA 1 04/06/2018    BLOODU Negative 11/23/2018    SPECGRAV 1.008 11/23/2018    GLUCOSEU Negative 11/23/2018    GLUCOSEU NEGATIVE 02/15/2011       Radiology:  CT Head WO Contrast   Final Result   There is a defect involving the outer cortex of the left frontal bone. It is   unclear whether this is posttraumatic or postsurgical.  No acute intracranial   abnormality identified. Mild atrophy and chronic white matter changes, similar in appearance. Multilevel degenerative changes in the cervical spine. No acute osseous   abnormality. CT Cervical Spine WO Contrast   Final Result   There is a defect involving the outer cortex of the left frontal bone. It is   unclear whether this is posttraumatic or postsurgical.  No acute intracranial   abnormality identified. Mild atrophy and chronic white matter changes, similar in appearance. Multilevel degenerative changes in the cervical spine. No acute osseous   abnormality. XR HIP 2-3 VW W PELVIS RIGHT   Final Result   Right hip prosthesis is in normal alignment. No acute fracture is   appreciated. XR SHOULDER RIGHT (MIN 2 VIEWS)   Final Result   Acute comminuted mildly displaced fracture of the proximal right humerus, as   described above.                  Assessment/Plan:    Right Humerus Fracture  - ortho consulted, nonoperative  - sling  - pain control  - PT/OT     Right Hip Pain  - initial XR neg for fracture  - if pain continues and unable to move we may need CT  - PT/OT  - pain control    Acute on Chronic Resp Failure with Hypoxia  - requires 2L at home, down to 70% on 2L in hospital  - titrate oxygen to keep sats>90%  - encouraged IS     Permanent Atrial Fib  - continue home meds  - HR up to 120 this AM, monitor closely as may need further intervention     Fall  - possible mechanical vs a fib induced vs BP  - PT/OT     Thromboctopenia  - monitor  - no acute bleeding issues now     Anemia  - Hg 10  - with fractures will need to monitor  - pt dropped 1gm overnight, continue to trend  - transfuse <7     CKD Stage 3  - continue to monitor     Hypothryoidism  - continue home synthroid        DVT Prophylaxis: lovnox  Diet: DIET GENERAL;  Code Status: DNR-CC    PT/OT Eval Status: SNF    Dispo - Inpt, will need placement at [address] [place] [address] MD Hemant

## 2019-05-09 NOTE — PROGRESS NOTES
bipolar hip    MOHS SURGERY  7/2012    PARATHYROIDECTOMY      MS LEFT HEART CATH,PERCUTANEOUS      Cardiac Cath, Left Heart       Home Environment / Functional History  Social/Functional History  Lives With: Daughter(dtr Tere who does not work -- disability with back issues)  Type of Home: House  Home Layout: Able to Live on Main level with bedroom/bathroom  Home Access: Stairs to enter without rails, Ramped entrance  Entrance Stairs - Number of Steps: ramp to front; 1 step in through garage  Bathroom Shower/Tub: Tub/Shower unit  [employer]: Handicap height(3-in-1 commode over high toilet)  Bathroom Equipment: Grab bars in shower, Shower chair, Hand-held shower  Bathroom Accessibility: Accessible  Home Equipment: Rolling walker, 4 wheeled walker, Wheelchair-manual, Lift chair, Oxygen(transport chair, home O2 @2L/min)  ADL Assistance: Needs assistance(Dtr assists in/out shower. Pt dresses independently)  Homemaking Assistance: Needs assistance(Dtr, HHA (1x/wk) take care of homemaking needs)  Ambulation Assistance: Independent(RW)  Transfer Assistance: Independent  Active : No  Patient's  Info: dgt  Additional Comments: Sleeps in lift chair; has home health nurse coming over. Restrictions  Restrictions/Precautions: Fall Risk, Weight Bearing     Right Upper Extremity Weight Bearing: Non Weight Bearing(NWB R UE x 6 weeks, Sling and swath in place)  Other position/activity restrictions: 4L 02     SUBJECTIVE: Pt supine in bed resting. Pt agreeable to get up without encouragement. OBJECTIVE:    OBSERVATIONS  Observation: bruising over prabhjot anterior knees    Bed mobility  Supine to Sit: Maximum assistance(going to right side )  Sit to Supine: Unable to assess(pt up in chair)  Scooting: Maximal assistance, Dependent/Total    Transfers  Sit to Stand:  Moderate Assistance(L UE support only, from bed, posterior and right wt shift)  Stand to sit: Moderate Assistance(to chair)    Ambulation  Ambulation  Ambulation?: Yes  Ambulation 1  Device: Hemiwalker(on left)  Other Apparatus: O2(4L/min via NC)  Assistance: Minimal assistance, Moderate assistance  Quality of Gait: somewhat unsteady, able to weight shift forward and midline but difficulty unweighting R LE to advance it, takes short steps unsteadily  Distance: 5' from bed to recliner chair  Comments: SpO2 drops to as low at 85% after exertion; up to 90% with 1 minute pursed lip breathing    Exercises   Exercises  Comments: SUPINE: heel slide x 10 reps with min Assist. SEATED: LAQ x 10 reps. Other Activities:  Comment: Ice pack applied to right shoulder and right lateral hip at end of session. Neuro/balance  Balance  Sitting - Static: Good  Standing - Static: Fair, -(Min Assist x 2 person for standing reaching activity to left and right -- pt declined to attempt any marching in place holding onto uri-walker)      Patient Education: Role of PT, importance of getting out of bed. She verb understanding. Safety Devices: Type of devices: All fall risk precautions in place, Call light within reach, Chair alarm in place, Left in chair, Nurse notified, Patient at risk for falls      ASSESSMENT:   Prior to admit pt was living at home with her dtr and ambulating independently with a rolling walker. Today she required up to Mod Assist x 1 person for transfers to uri-walker. She was able to ambulate 5' with a uri-walker and Min/Mod Assist x 2 person. She desaturated to 85% walking with 2L/min oxygen saturation (required 1 minutes of pursed lip breathing to come up to 90%). She is functioning well below her baseline and clearly unsafe to return home at this time. Recommend skilled PT 3-5x/wk at discharge to optimize patient's functional mobility. Will follow. Pola Gomez scored a 12/24 on the AM-PAC short mobility form.  Initial research suggests that an AM-PAC score of 18 or greater may be associated with a discharge to patient's home setting. However in this initial research, cut off scores do not perfectly predict discharge location: 25% of patients with a score of 18 or greater actually went to a rehab facility and 20% of people with a score less than 18 actually went home. Based on my clinical judgement I recommend that the patient have 3-5 sessions per week of Physical Therapy at d/c to increase the patients independence. Goals:  Time Frame for Short term goals: upon d/c - all goals ongoing 05/09/19 except as noted below  Short term goal 1: sup<>sit Min Assist   Short term goal 2: sit<>stand Min Assist   Short term goal 3: bed<>chair stand-pivot transfer with Min Assist  Short term goal 4: Pt assessed for w/c mobility  Short term goal 5: Pt assessed for possible gait with uri-walker (does not appear to have sufficient balance for this) - MET 5/8 - Advance to: ambulates 10' with uri-walker and CGA      Safety devices:   Type of devices: All fall risk precautions in place, Call light within reach, Chair alarm in place, Left in chair, Nurse notified, Patient at risk for falls        PLAN:  Times per week: 3-5x/wk while in the hospital;    Current Treatment Recommendations: Functional Mobility Training    If patient is discharged prior to next treatment, this note will serve as the discharge summary.     Therapy Time    Individual Concurrent Group Co-treatment   Time In      1033   Time Out     1105   Minutes      32    Timed Code Treatment Minutes: 28 Minutes      Neal Deng, PT

## 2019-05-09 NOTE — CARE COORDINATION
SW received call from [facility] at [facility]. They do not have any beds available, have a wait list, and will not have any beds through the weekend. Per MD note, patient likely to dc tomorrow if O2 improves. Met with patient and dtg at bedside to inform. List of facilities provided yesterday and requested review at this time. Dtg reports she has not had time to review and will likely not review until tomorrow. Urged her to review as SW needs to initiate referrals and ensure bed availability. Dtg continually asks SW to reach out to [facility] even after informing multiple times that they do not have a bed available. Again, advised her to review list and inform SW of facility preferences to begin making referrals. Dtg proceeds to say she will need to tour facilities and discuss with her brother before making a decision. SW advised her it would be best for SW to determine if bed is available prior to touring and therefore would need preferences. Also advised her to search Medicare. gov to determine distance from preferred location and quality measures due to questions regarding facilities. Voiced understanding. Dtg had questions regarding discharge process throughout the weekend. Reassured her patient would be able to discharge over the weekend once facility is secured. Informed her that patient is expected to leave the hospital once medically stable for discharge and facility is secured. Again, urged dtg to review list and determine facility preference to initiate referrals. Contact info left again.     Electronically signed by CHELSY Cast on 5/9/2019 at 3:40 PM

## 2019-05-09 NOTE — PROGRESS NOTES
Hospitalist Progress Note      PCP: Angelica Jacobson MD    Date of Admission: 5/7/2019    Chief Complaint: [facility] Course: The patient is a 80 y.o. female who presents to [facility] [address] with mechanical fall. She was on her way to another room when she decided to turn and look at the TV to see what the temp would be when she fell over landing on her right side. Patient was then unable to get up so EMS was called. In ED patient was found to have fractured right humerus along with a fib with RVR. Patient will need to be admitted for further intervention and workup. Second day of admission patient developed acute on chronic resp failure with hypoxia and uncontrolled HR. Subjective: Patient seen and examined. Oxygen saturations continue to be low despite oxygen therapy. Will give extra dose of IV lasix today and see if that helps.      Medications:  Reviewed    Infusion Medications   Scheduled Medications    oyster shell calcium/vitamin D  1 tablet Oral Daily    docusate sodium  100 mg Oral Daily    pantoprazole  40 mg Oral QAM AC    mometasone-formoterol  2 puff Inhalation BID    levothyroxine  100 mcg Oral Daily    lisinopril  5 mg Oral Daily    midodrine  5 mg Oral TID WC    torsemide  20 mg Oral Once per day on Sun Tue Thu Sat    torsemide  40 mg Oral Once per day on Mon Wed Fri    vitamin D  1,000 Units Oral Daily    sodium chloride flush  10 mL Intravenous 2 times per day    tiotropium  18 mcg Inhalation Daily    enoxaparin  30 mg Subcutaneous Daily     PRN Meds: sennosides-docusate sodium, albuterol sulfate HFA, diclofenac sodium, guaiFENesin, sodium chloride flush, ondansetron, acetaminophen, HYDROcodone 5 mg - acetaminophen      Intake/Output Summary (Last 24 hours) at 5/9/2019 0706  Last data filed at 5/8/2019 2256  Gross per 24 hour   Intake 240 ml   Output --   Net 240 ml       Physical Exam Performed:    /77   Pulse 97   Temp 97.6 °F (36.4 °C) (Oral)   Resp 17   Ht 4' 11\" (1.499 m)   Wt 105 lb 2.6 oz (47.7 kg)   SpO2 97%   BMI 21.24 kg/m²     General appearance: No apparent distress, appears stated age and cooperative. HEENT: Pupils equal, round, and reactive to light. Conjunctivae/corneas clear. Neck: Supple, with full range of motion. No jugular venous distention. Trachea midline. Respiratory:  Normal respiratory effort. Diminished BS BL  Cardiovascular: irrgular rate and rhythm with normal S1/S2   Abdomen: Soft, non-tender, non-distended with normal bowel sounds. Musculoskeletal: R arm in slng  Skin: Skin color, texture, turgor normal.  No rashes or lesions. Neurologic:  Neurovascularly intact without any focal sensory/motor deficits. Cranial nerves: II-XII intact, grossly non-focal.  Psychiatric: Alert and oriented, thought content appropriate, normal insight  Capillary Refill: Brisk,< 3 seconds   Peripheral Pulses: +2 palpable, equal bilaterally       Labs:   Recent Labs     05/07/19  0835 05/08/19  0645   WBC 3.9* 4.6   HGB 10.9* 9.6*   HCT 33.4* 28.8*   * 122*     Recent Labs     05/07/19  1008 05/08/19  0644    140   K 5.4* 5.3*   CL 99 103   CO2 29 28   BUN 64* 55*   CREATININE 1.5* 1.5*   CALCIUM 9.7 9.7     Recent Labs     05/07/19  1008   AST 25   ALT 25   BILITOT <0.2   ALKPHOS 116     Recent Labs     05/07/19  0835   INR 0.94     Recent Labs     05/07/19  0835   TROPONINI <0.01       Urinalysis:      Lab Results   Component Value Date    NITRU Negative 11/23/2018    WBCUA 160 04/06/2018    BACTERIA 3+ 04/06/2018    RBCUA 1 04/06/2018    BLOODU Negative 11/23/2018    SPECGRAV 1.008 11/23/2018    GLUCOSEU Negative 11/23/2018    GLUCOSEU NEGATIVE 02/15/2011       Radiology:  CT Head WO Contrast   Final Result   There is a defect involving the outer cortex of the left frontal bone. It is   unclear whether this is posttraumatic or postsurgical.  No acute intracranial   abnormality identified.       Mild atrophy and chronic white matter changes, similar in appearance. Multilevel degenerative changes in the cervical spine. No acute osseous   abnormality. CT Cervical Spine WO Contrast   Final Result   There is a defect involving the outer cortex of the left frontal bone. It is   unclear whether this is posttraumatic or postsurgical.  No acute intracranial   abnormality identified. Mild atrophy and chronic white matter changes, similar in appearance. Multilevel degenerative changes in the cervical spine. No acute osseous   abnormality. XR HIP 2-3 VW W PELVIS RIGHT   Final Result   Right hip prosthesis is in normal alignment. No acute fracture is   appreciated. XR SHOULDER RIGHT (MIN 2 VIEWS)   Final Result   Acute comminuted mildly displaced fracture of the proximal right humerus, as   described above.                  Assessment/Plan:    Right Humerus Fracture  - ortho consulted, nonoperative  - sling  - pain control  - PT/OT     Right Hip Pain  - initial XR neg for fracture  - pain better controlled, moving with minimal pain  - PT/OT  - pain control    Acute on Chronic Resp Failure with Hypoxia  - requires 2L at home, down to 70% on 2L in hospital  - titrate oxygen to keep sats>90%  - encouraged IS  - on 4L now  - likely some fluid overlaod as well, diurese     Permanent Atrial Fib  - continue home meds     Fall  - possible mechanical vs a fib induced vs BP  - PT/OT     Thromboctopenia  - monitor  - no acute bleeding issues now     Anemia  - Hg 10  - with fractures will need to monitor  - stable  - transfuse <7     CKD Stage 3  - continue to monitor     Hypothryoidism  - continue home synthroid        DVT Prophylaxis: lovnox  Diet: DIET GENERAL;  Dietary Nutrition Supplements: Standard High Calorie Oral Supplement  Code Status: DNR-CC    PT/OT Eval Status: SNF    Dispo - DC tomorrow to SNF as long as oxygen better    Verona Ahn MD

## 2019-05-09 NOTE — PROGRESS NOTES
Occupational Therapy  Facility/Department: [address] MED SURG  Daily Treatment Note  Let this serve as discharge note if patient is discharged prior to next OT session  NAME: Malcolm Long  : 3/11/1930  MRN: [id number]    Date of Service: 2019    Discharge Recommendations:  3-5 sessions per week    Naa Beltrán scored a  on the AM-PAC ADL Inpatient form. Current research shows that an AM-PAC score of 17 or less is typically not associated with a discharge to the patient's home setting. Based on the patients AM-PAC score and their current ADL deficits, it is recommended that the patient have 3-5 sessions per week of Occupational Therapy at d/c to increase the patients independence. Assessment   Performance deficits / Impairments: Decreased functional mobility ; Decreased ADL status; Decreased ROM; Decreased endurance;Decreased balance;Decreased strength;Decreased safe awareness;Decreased fine motor control  Assessment: Patient is requiring Mod A for transfers, yet Mod A x2 for fxl mobility for short distances. Patient is on 4L 02 this date, sats drop to 85% with short distances, yet recovers quicker than yesterday. Patient is dependent for oral care. Cont per POC. Prognosis: Good  Patient Education: safe transfers, how to engage in functional tasks   REQUIRES OT FOLLOW UP: Yes  Activity Tolerance: Patient Tolerated treatment well;Patient limited by fatigue(patient on 4L this date, recovers quicker than yesterday, yet sats did drop to 85% with transfer/short distance )  Safety Devices in place: Yes  Type of devices: Call light within reach; Chair alarm in place; Left in chair;Gait belt;Nurse notified(LILIANA Yu aware )         Patient Diagnosis(es): The primary encounter diagnosis was Fall, initial encounter. Diagnoses of Fracture of humeral head, closed, right, initial encounter, Permanent atrial fibrillation ([facility] [address] and Gait abnormality were also pertinent to this visit.       has a past medical history of Atrial fibrillation ([facility] [address] C. difficile colitis, Cancer ([facility] [address] Cardiomyopathy ([address] Chemotherapy, CHF (congestive heart failure) ([address] COPD (chronic obstructive pulmonary disease) ([facility] [address] Dependence on supplemental oxygen, Hypertension, Hypothyroidism, Osteoarthritis, and Rosacea. has a past surgical history that includes Breast surgery; parathyroidectomy; Foot surgery; pr left heart cath,percutaneous; Mohs surgery (7/2012); Hip Arthroplasty (Right, 09/12/2017); Bunionectomy (Right); Colonoscopy (2011); Eye surgery; and Cataract removal (Bilateral). Restrictions  Restrictions/Precautions: Fall Risk, Weight Bearing  Right Upper Extremity Weight Bearing: Non Weight Bearing x 6 weeks, Sling and swath in place  Other position/activity restrictions: 4L 02     Subjective  Chart Reviewed: Yes  Patient assessed for rehabilitation services?: Yes  Additional Pertinent Hx: Pt is a 80 y.o. female presenting to [facility] with c/o fall, R shoulder pain. Pt found to have minimally displaced right proximal humerus fracture. Ortho consulted and recommend non-surgical surgical. R UE in sling in swathe, presumed NWB R UE.    Response to previous treatment: Patient with no complaints from previous session  Family / Caregiver Present: No  Referring Practitioner: Deonna Allen   Diagnosis: fall, right humerus fracture  Subjective: Patient met b/s as co-treat with PT, agreeable to therapy, reports right UE pain, yet does not rate     Orientation  Overall Orientation Status: Within Functional Limits    Objective    ADL  Feeding: (able to drink by herself, yet requires assist to feed self )  Grooming: (total assist to brush dentures, patient not willing to try to brush dentures on her own due to fear of dropping them )  UE Dressing: (total assist to adjust sling )  Toileting: Dependent/Total(catheter )    Balance  Sitting Balance: Stand by assistance  Standing Balance: (Min A x2 while performing reaching tasks )    Transfers  Sit to stand: Moderate assistance  Stand to sit: Moderate assistance    Functional Mobility  Functional - Mobility Device: Hemiwalker  Activity: Other  Assist Level: (initial Min A x2, yet increased to Mod A x 2 bed-chair with uri-walker, increased cuing for sequencing )    Bed mobility  Supine to Sit: Maximum assistance(going to right side )  Scooting: Maximal assistance;Dependent/Total    Cognition  Overall Cognitive Status: Exceptions  Problem Solving: Decreased awareness of errors;Assistance required to identify errors made;Assistance required to generate solutions  Initiation: Requires cues for some  Sequencing: Requires cues for some  Cognition Comment: patient requires increased prompting to try tasks on her own     Plan  Times per week: 3-5  Times per day: Daily  Current Treatment Recommendations: ROM, Balance Training, Functional Mobility Training, Endurance Training, Strengthening, Self-Care / ADL, Safety Education & Training    Goals  Short term goals  Time Frame for Short term goals: Prior to d/c: status as of 5/9/19: goals ongoing   Short term goal 1: Pt will complete UB bathing/dressing with mod A. Short term goal 2: Pt will toilet with max A. Short term goal 3: Pt will complete grooming with set-up/SBA. Short term goal 4: Pt will complete fxl transfer to/from ADL surfaces with min A. Short term goal 5: Pt will increase activity tolerance to achieve the above goals. Long term goals  Time Frame for Long term goals : STG=LTG   Patient Goals   Patient goals : to eventually return home.         Therapy Time   Individual Concurrent Group Co-treatment   Time In       1033   Time Out       1105   Minutes       32   Timed Code Treatment Minutes: [address] [place] [id number]

## 2019-05-09 NOTE — PROGRESS NOTES
Pt resting in bed, eyes closed. Arouses easily to voice. Daughter at bedside. Patient denies any pain/discomfort or needs at this time. VSS, Medicated per MAR. Daughter stated that patient seems a little more confused this evening. Bed alarm on. Call light within reach.  Will continue to monitor

## 2019-05-09 NOTE — DISCHARGE INSTR - COC
Continuity of Care Form    Patient Name: Kandy Hunter   :  3/11/1930  MRN:  [id number]    Admit date:  2019  Discharge date:  ***    Code Status Order: [facility]   Advance Directives:   Advance Care Flowsheet Documentation     Date/Time Healthcare Directive Type of Healthcare Directive Copy in [address] Agent's Name Healthcare Agent's Phone Number    [phone]  Yes, patient has an advance directive for healthcare treatment  Living will;Health care treatment directive;Durable power of  for health care  Yes, copy in chart  Adult [address]  [phone]          Admitting Physician:  Abbie Laguna MD  PCP: Leida Boykin MD    Discharging Nurse: [facility] Unit/Room#: P5E-2997/1483-00  Discharging Unit Phone Number: ***    Emergency Contact:   Extended Emergency Contact Information  Primary Emergency Contact: [facility]  Address: [address] [place] [address] Phone: [phone]  Work Phone: [phone]  Relation: Child  Secondary Emergency Contact: Jeb Guerra, [address] Phone: [phone]  Mobile Phone: [phone]  Relation: Child    Past Surgical History:  Past Surgical History:   Procedure Laterality Date    BREAST SURGERY      Left breast lumpectomy    BUNIONECTOMY Right     CATARACT REMOVAL Bilateral     COLONOSCOPY      EYE SURGERY      FOOT SURGERY      toe    HIP ARTHROPLASTY Right 2017    right bipolar hip    MOHS SURGERY  2012    PARATHYROIDECTOMY      [place] LEFT HEART CATH,PERCUTANEOUS      Cardiac Cath, Left Heart       Immunization History:   Immunization History   Administered Date(s) Administered    Influenza Vaccine, unspecified formulation 10/02/2018    Influenza, High Dose (Fluzone 65 yrs and older) 2015, 10/17/2016, 10/13/2017    PPD Test 2017, 2017    Pneumococcal 13-valent Amanda Mock Wallace) 02/24/2016       Active Problems:  Patient Active Problem List   Diagnosis Code    Essential hypertension, benign I10    Cardiomyopathy ([facility] I42.9    SOB (shortness of breath) R06.02    Pulmonary emphysema (HCC) J43.9    Breast cancer ([facility] C50.919    Cystocele [id number]    Rectocele N81.6    9/12/17 RIGHT hip hemiarthroplasty S72.001A    HTN (hypertension) I10    HLD (hyperlipidemia) E78.5    Chronic diastolic CHF (congestive heart failure) ([facility]) I50.32    GERD (gastroesophageal reflux disease) K21.9    Bilateral pulmonary embolism ([facility]) I26.99    Acute on chronic respiratory failure with hypoxia ([facility]) J96.21    Chronic obstructive pulmonary disease ([facility]) J44.9    Centrilobular emphysema ([facility]) J43.2    Dysphagia R13.10    Hypothyroid E03.9    Staphylococcal pneumonia ([facility] J15.20    NANCY (acute kidney injury) ([facility] N17.9    Chronic systolic CHF (congestive heart failure) ([facility]) I50.22    Chronic atrial fibrillation ([facility]) I48.2    Pulmonary edema with congestive heart failure ([facility]) I50.1    Anemia D64.9    Hypotension I95.9    Bradycardia R00.1    Leukopenia D72.819    Thrombocytopenia ([facility]) D69.6    Moderate malnutrition ([facility]) E44.0    Chronic respiratory failure with hypoxia ([facility]) J96.11    Gram-negative pneumonia ([facility]) J15.6    Abnormal radiograph R93.89    CHF (congestive heart failure), NYHA class I, acute on chronic, combined ([facility]) I50.43    Congestive heart failure (HCC) I50.9    Moderate malnutrition ([facility]) E44.0    Atrial fibrillation with RVR ([facility]) I48.91    Closed fracture of right proximal humerus S42.201A       Isolation/Infection:   Isolation          No Isolation            Nurse Assessment:  Last Vital Signs: /77   Pulse 97   Temp 97.6 °F (36.4 °C) (Oral)   Resp 17   Ht 4' 11\" (1.499 m)   Wt 99 lb 3.3 oz (45 kg)   SpO2 97%   BMI 20.04 kg/m²     Last documented pain score (0-10 scale): Pain Level: 8  Last Weight:   Wt Readings from and diastolic heart failure. She will require the following:     Please weigh daily (same scale, same time of day). Please report weight gain of 3 pounds / one day or 5 pounds / one week to [facility] ([phone])     Please use DRY WEIGHT of 99# as baseline reference. Please monitor for s/sx of worsening HF : sudden weight gain, SOB, LE edema, abdominal distention, inability to lie flat, intolerance to usual activity, cough (especially at night)  Please report these findings even if no increase is noted on scale. Please continue LOW SODIUM diet and LIMIT FLUIDS to 48-64 oz /day     Please call [facility] ([phone]) and /or [facility] [employer] ([phone]) with any questions or concerns. Please have facility MD complete required 7 day follow up     Please see AVS for any other hospital follow up appointment details. Treatments at the Time of Hospital Discharge:   Respiratory Treatments: ***  Oxygen Therapy:  {Therapy; copd oxygen:33974}  Ventilator:    { CC Vent UFII:226739760}    Rehab Therapies: Physical Therapy, Occupational Therapy and nursing  Weight Bearing Status/Restrictions: nonweightbearing right arm in sling all times unless bathing or dressing. Keep right arm close to side ,no reaching in any direction  Other Medical Equipment (for information only, NOT a DME order):  {EQUIPMENT:169075514}  Other Treatments:    Followup Dr Chantell Albarado in office in 3-4 weeks   [facility] and [address] [place] [address], [address] [facility], [address] [place] [address] [address] ,   [phone]    Patient's personal belongings (please select all that are sent with patient):  {CHP DME Belongings:140590720}    RN SIGNATURE:  {Esignature:161229956}      CASE MANAGEMENT/SOCIAL WORK SECTION    Inpatient Status Date: 5/7/19    Readmission Risk Assessment Score:  Readmission Risk              Risk of Unplanned Readmission:        34           Discharging to Facility/ Agency   · Name: Kelechi Kulkarnis  · Address: [address] [place] [address]  · Phone: [phone]   · Fax: [phone]      / signature: Electronically signed by CHELSY Russell on 5/10/19 at 2:45 PM        PHYSICIAN SECTION    Prognosis: {Prognosis:9528904352}    Condition at Discharge: Lamberto Choi Patient Condition:029606282}    Rehab Potential (if transferring to Rehab): {Prognosis:5120860189}    Recommended Labs or Other Treatments After Discharge: ***    Physician Certification: I certify the above information and transfer of Shivani Liu  is necessary for the continuing treatment of the diagnosis listed and that she requires {Admit to Appropriate Level of Care:54101} for {GREATER/LESS:245810813} 30 days.      Update Admission H&P: {CHP DME Changes in MVXUC:478693452}    PHYSICIAN SIGNATURE:  {Esignature:197172169}

## 2019-05-09 NOTE — PROGRESS NOTES
[facility] Orthopedic Surgery   Progress Note      S/P :  SUBJECTIVE  In bed. Alert and oriented. . Pain is   described in right shoulder and with the intensity of mild. Pain is described as aching. OBJECTIVE              Physical                      VITALS:  BP 99/64   Pulse 91   Temp 98 °F (36.7 °C) (Oral)   Resp 15   Ht 4' 11\" (1.499 m)   Wt 105 lb 2.6 oz (47.7 kg)   SpO2 90%   BMI 21.24 kg/m²                     MUSCULOSKELETAL:  right hand NVI. Wiggles fingers to command. Radial pulses are palpable.                    NEUROLOGIC:                                  Sensory:  Touch:  Right Upper Extremity:  normal                                              Right arm in sling and swathe    Data       CBC:   Lab Results   Component Value Date    WBC 4.6 05/08/2019    RBC 3.22 05/08/2019    HGB 9.9 05/09/2019    HCT 29.7 05/09/2019    MCV 89.3 05/08/2019    MCH 29.7 05/08/2019    MCHC 33.2 05/08/2019    RDW 16.6 05/08/2019     05/08/2019    MPV 7.6 05/08/2019        WBC:    Lab Results   Component Value Date    WBC 4.6 05/08/2019        Hemoglobin/Hematocrit:    Lab Results   Component Value Date    HGB 9.9 05/09/2019    HCT 29.7 05/09/2019        PT/INR:    Lab Results   Component Value Date    PROTIME 10.7 05/07/2019    INR 0.94 05/07/2019              Current Inpatient Medications             Current Facility-Administered Medications: furosemide (LASIX) injection 40 mg, 40 mg, Intravenous, Once  sennosides-docusate sodium (SENOKOT-S) 8.6-50 MG tablet 2 tablet, 2 tablet, Oral, Daily PRN  albuterol sulfate  (90 Base) MCG/ACT inhaler 2 puff, 2 puff, Inhalation, Q4H PRN  oyster shell calcium/vitamin D 250-125 MG-UNIT per tablet 250 mg, 1 tablet, Oral, Daily  diclofenac sodium 1 % gel 2 g, 2 g, Topical, 4x Daily PRN  docusate sodium (COLACE) capsule 100 mg, 100 mg, Oral, Daily  pantoprazole (PROTONIX) tablet 40 mg, 40 mg, Oral, QAM AC  mometasone-formoterol (DULERA) 100-5 MCG/ACT inhaler 2 puff, 2 puff, Inhalation, BID  guaiFENesin (MUCINEX) extended release tablet 600 mg, 600 mg, Oral, BID PRN  levothyroxine (SYNTHROID) tablet 100 mcg, 100 mcg, Oral, Daily  lisinopril (PRINIVIL;ZESTRIL) tablet 5 mg, 5 mg, Oral, Daily  midodrine (PROAMATINE) tablet 5 mg, 5 mg, Oral, TID WC  torsemide (DEMADEX) tablet 20 mg, 20 mg, Oral, Once per day on Sun Tue Thu Sat  torsemide [facility]) tablet 40 mg, 40 mg, Oral, Once per day on Mon Wed Fri  vitamin D (CHOLECALCIFEROL) tablet 1,000 Units, 1,000 Units, Oral, Daily  sodium chloride flush 0.9 % injection 10 mL, 10 mL, Intravenous, 2 times per day  sodium chloride flush 0.9 % injection 10 mL, 10 mL, Intravenous, PRN  ondansetron (ZOFRAN) injection 4 mg, 4 mg, Intravenous, Q6H PRN  acetaminophen (TYLENOL) tablet 650 mg, 650 mg, Oral, Q4H PRN  tiotropium (SPIRIVA) inhalation capsule 18 mcg, 18 mcg, Inhalation, Daily  enoxaparin (LOVENOX) injection 30 mg, 30 mg, Subcutaneous, Daily  HYDROcodone-acetaminophen (NORCO) 5-325 MG per tablet 1 tablet, 1 tablet, Oral, Q4H PRN    ASSESSMENT AND PLAN    Right shoulder pain  Right prox humerus fx, NWB in sling and swathe  Stable for DC per ortho and FU in office in 3-4 weeks with Dr Aminta Apodaca  5/9/2019  12:54 PM

## 2019-05-10 NOTE — PROGRESS NOTES
Patient is A&O x3.  O2 4L, sat 90%. No complaints of SOB. Medicated as needed for c/o right arm pain. Respirations appear to be easy and unlabored. Lungs clear. Respirations easy with no complaints of cough. Cardiac monitor in place, current rhythm controlled atrial fib. Left AC PIV intact and flusehd. No complaints of nausea/vomiting/diarrhea. Up with assist/steady x2 person to the bathroom/BSC as needed. Eason cath intact with clear-yellow urine output. Tolerating low sodium diet with a 1.5 liter fluid restriction. Right arm sling in place. Plan of care and safety measures reviewed with the patient. Call light in reach and bed alarm in place. Will continue to monitor.   Electronically signed by Pablo Jeffers RN on 5/9/2019 at 9:15 PM

## 2019-05-10 NOTE — PROGRESS NOTES
Patient in bed resting comfortably. Respirations steady and unlabored. No signs of respiratory or cardiac distress. No complaints of pain at this time. No needs at this time. Call light in reach and bed alarm in place. Will continue to monitor.   Electronically signed by Heidi Escobedo RN on 5/10/2019 at 3:03 AM

## 2019-05-10 NOTE — PROGRESS NOTES
Occupational Therapy  Facility/Department: [address] MED SURG  Daily Treatment Note  Let this serve as discharge note if patient is discharged prior to next OT session  NAME: Kristi Dawn  : 3/11/1930  MRN: [id number]    Date of Service: 5/10/2019    Discharge Recommendations:  3-5 sessions per week    Gonzalo Reyna scored a 9/24 on the AM-PAC ADL Inpatient form. Current research shows that an AM-PAC score of 17 or less is typically not associated with a discharge to the patient's home setting. Based on the patients AM-PAC score and their current ADL deficits, it is recommended that the patient have 3-5 sessions per week of Occupational Therapy at d/c to increase the patients independence. Assessment   Performance deficits / Impairments: Decreased functional mobility ; Decreased ADL status; Decreased ROM; Decreased endurance;Decreased balance;Decreased strength;Decreased safe awareness;Decreased fine motor control  Assessment: Patient is requiring Mod A for transfers, yet Mod A x2 for fxl mobility for short distances. Patient is on 4L 02 this date, sats drop to 86-87% with short distances, yet recovers quicker than yesterday. Cont per POC. Prognosis: Fair  Patient Education: safe transfers, increasing activity   REQUIRES OT FOLLOW UP: Yes  Activity Tolerance: Patient limited by fatigue(patient on 4L 02, 02 sats drop briefly to 86-87% with activity, yet recovers quickly )  Safety Devices in place: Yes  Type of devices: Call light within reach; Chair alarm in place; Left in chair;Gait belt;Nurse notified(LILIANA Almaguer notified )         Patient Diagnosis(es): The primary encounter diagnosis was Fall, initial encounter. Diagnoses of Fracture of humeral head, closed, right, initial encounter, Permanent atrial fibrillation ([address] and Gait abnormality were also pertinent to this visit.       has a past medical history of Atrial fibrillation ([address] C. difficile colitis, Cancer ([address] Cardiomyopathy ([address] Chemotherapy, CHF (congestive heart failure) (HCC), COPD (chronic obstructive pulmonary disease) ([facility] Dependence on supplemental oxygen, Hypertension, Hypothyroidism, Osteoarthritis, and Rosacea. has a past surgical history that includes Breast surgery; parathyroidectomy; Foot surgery; pr left heart cath,percutaneous; Mohs surgery (7/2012); Hip Arthroplasty (Right, 09/12/2017); Bunionectomy (Right); Colonoscopy (2011); Eye surgery; and Cataract removal (Bilateral). Restrictions  Restrictions/Precautions: Fall Risk, Weight Bearing  Right Upper Extremity Weight Bearing: Non Weight Bearing(NWB R UE x 6 weeks, Sling and swath in place)  Other position/activity restrictions: 4L 02     Subjective   Chart Reviewed: Yes  Patient assessed for rehabilitation services?: Yes  Additional Pertinent Hx: Pt is a 80 y.o. female presenting to [facility] with c/o fall, R shoulder pain. Pt found to have minimally displaced right proximal humerus fracture. Ortho consulted and recommend non-surgical treatment. R UE in sling in swathe, NWB R UE. Response to previous treatment: Patient with no complaints from previous session  Family / Caregiver Present: Yes, daughter present   Referring Practitioner: Raphael Lopez   Diagnosis: fall, right humerus fracture  Subjective: Patient met b/s as co-treat with PT, patient does not resist therapy/OOB, reports UE pain, yet doesn't rate     Objective   ADL  Toileting: Dependent/Total(catheter urine )    Balance  Sitting Balance: Stand by assistance  Standing Balance: (Min A x 2 )    Transfers  Sit to stand: (Min/Mod A x1-2 )  Stand to sit: (Min/Mod A x1-2)    Functional Mobility  Functional - Mobility Device: Hemiwalker  Activity: Other  Functional Mobility Comments:  Mod A x 2 with uri-walker x 8 feet and then x 2 feet, requires assist advancing walker, requires assist to advance right LE at times, increased encouragement to advance activity     Bed mobility  Supine to Sit: Maximum assistance(going to right side )  Scooting: Maximal assistance;Dependent/Total    Cognition  Overall Cognitive Status: Exceptions  Problem Solving: Decreased awareness of errors;Assistance required to identify errors made;Assistance required to generate solutions  Insights: Decreased awareness of deficits  Initiation: Requires cues for some  Sequencing: Requires cues for some  Cognition Comment: patient requires increased prompting to try tasks on her own     Plan   Times per week: 3-5  Times per day: Daily  Current Treatment Recommendations: ROM, Balance Training, Functional Mobility Training, Endurance Training, Strengthening, Self-Care / ADL, Safety Education & Training    Goals  Short term goals  Time Frame for Short term goals: Prior to d/c: status as of 5/10/19: goals ongoing   Short term goal 1: Pt will complete UB bathing/dressing with mod A. Short term goal 2: Pt will toilet with max A. Short term goal 3: Pt will complete grooming with set-up/SBA. Short term goal 4: Pt will complete fxl transfer to/from ADL surfaces with min A. Short term goal 5: Pt will increase activity tolerance to achieve the above goals. Long term goals  Time Frame for Long term goals : STG=LTG   Patient Goals   Patient goals : to eventually return home.         Therapy Time   Individual Concurrent Group Co-treatment   Time In       1250   Time Out       1315   Minutes       25   Timed Code Treatment Minutes: [address] [address]

## 2019-05-10 NOTE — PROGRESS NOTES
Physical Therapy  Co-Tx with [facility]  Daily Treatment Note    Patient Name: Surya Pérez  Medical Record Number: [id number]  Room Number: G3O-6794/7196-11    ASSESSMENT: Prior to admit pt was living at home with her dtr and ambulating independently with a rolling walker. Today she required up to Min/Mod Assist x 2 person for transfers to uri-walker. She was able to ambulate several feet with a uri-walker and Min/Mod Assist x 2 person. SpO2 maintained mostly in low 90's walking with 4.5L/min oxygen saturation. She is functioning well below her baseline and clearly unsafe to return home at this time. Recommend skilled PT 3-5x/wk at discharge to optimize patient's functional mobility. Will follow. Discharge Recommendations: 3-5 sessions per week, Patient would benefit from continued therapy after discharge  Equipment Needs: Equipment Needed: No(defer to next level of care)    Admission Date: 5/7/2019  8:02 AM    Additional Pertinent Hx: Radha Lindsay is an 80 y.o. F admit 5/7/19 with fall and resultant minimally displaced R proximal humeral fx -- ortho evaluated patient and recommended conservative care. CT of head, cervical spine, left hip negative acute. Diagnosis: Fall, right humeral fx  Restrictions/Precautions: Fall Risk, Weight Bearing Other position/activity restrictions: 4L 02      PMHx:  has a past medical history of Atrial fibrillation ([address] C. difficile colitis, Cancer ([address] Cardiomyopathy ([address] Chemotherapy, CHF (congestive heart failure) ([address] COPD (chronic obstructive pulmonary disease) ([address] Dependence on supplemental oxygen, Hypertension, Hypothyroidism, Osteoarthritis, and Rosacea.   PSgHx:   Past Surgical History:   Procedure Laterality Date    BREAST SURGERY      Left breast lumpectomy    BUNIONECTOMY Right     CATARACT REMOVAL Bilateral     COLONOSCOPY  2011    EYE SURGERY      FOOT SURGERY      toe    HIP ARTHROPLASTY Right 09/12/2017    right bipolar hip    MOHS SURGERY  7/2012    PARATHYROIDECTOMY      [place] LEFT HEART CATH,PERCUTANEOUS      Cardiac Cath, Left Heart       Home Environment / Functional History  Social/Functional History  Lives With: Daughter(dtr Tere who does not work -- disability with back issues)  Type of Home: House  Home Layout: Able to Live on Main level with bedroom/bathroom  Home Access: Stairs to enter without rails, Ramped entrance  Entrance Stairs - Number of Steps: ramp to front; 1 step in through garage  Bathroom Shower/Tub: Tub/Shower unit  [employer]: Handicap height(3-in-1 commode over high toilet)  Bathroom Equipment: Grab bars in shower, Shower chair, Hand-held shower  Bathroom Accessibility: Accessible  Home Equipment: Rolling walker, 4 wheeled walker, Wheelchair-manual, [address] chair, Oxygen(transport chair, home O2 @2L/min)  ADL Assistance: Needs assistance(Dtr assists in/out shower. Pt dresses independently)  Homemaking Assistance: Needs assistance(Dtr, HHA (1x/wk) take care of homemaking needs)  Ambulation Assistance: Independent(RW)  Transfer Assistance: Independent  Active : No  Patient's  Info: dgt  Additional Comments: Sleeps in lift chair; has home health nurse coming over. Restrictions  Restrictions/Precautions: Fall Risk, Weight Bearing     Right Upper Extremity Weight Bearing: Non Weight Bearing(NWB R UE x 6 weeks, Sling and swath in place)  Other position/activity restrictions: 4L 02     SUBJECTIVE: Pt supine in room with dtr visiting. Pt's dtr expressing concern multiple times regarding patient's breathing issues. Pt willing to get up and mobilize with encouragement. Pt c/o R shoulder and left lateral hip pain (arthritic per her self report).     Pain: Patient Currently in Pain: Yes      OBJECTIVE:    Bed mobility  Supine to Sit: Maximum assistance(going to right side )  Sit to Supine: Unable to assess(pt up in chair)  Scooting: Maximal assistance, Dependent/Total    Transfers  Sit to Stand: Minimal Assistance, Moderate Assistance, 2 Person Assistance  Stand to sit: Moderate Assistance, Minimal Assistance    Ambulation  Ambulation  Ambulation?: Yes  Ambulation 1  Device: Hemiwalker(on left)  Other Apparatus: O2(~4.5 L/min via NC)  Assistance: Contact guard assistance, Minimal assistance  Quality of Gait: somewhat unsteady, able to weight shift forward and midline but difficulty unweighting R LE to advance it, takes short steps unsteadily  Distance: 1x8', 1x2'  Comments: SpO2 primarily in low 90's throughout (momentary drops to upper 80's)    Exercises   Exercises  Comments: SEATED: LAQ x 10 reps, hip flexion x 10 reps, hip ER against manual resistance x 10 reps. Other Activities:  Comment: Ice pack applied to right shoulder and right lateral hip at end of session. Neuro/balance  Balance  Sitting - Static: Good  Standing - Static: Fair, -      Patient Education: Role of PT, importance of getting out of bed. She verb understanding. Safety Devices: Type of devices: All fall risk precautions in place, Call light within reach, Chair alarm in place, Left in chair, Nurse notified, Patient at risk for falls      ASSESSMENT:   Prior to admit pt was living at home with her dtr and ambulating independently with a rolling walker. Today she required up to Min/Mod Assist x 2 person for transfers to uri-walker. She was able to ambulate several feet with a uri-walker and Min/Mod Assist x 2 person. SpO2 maintained mostly in low 90's walking with 4.5L/min oxygen saturation. She is functioning well below her baseline and clearly unsafe to return home at this time. Recommend skilled PT 3-5x/wk at discharge to optimize patient's functional mobility. Will follow. Surya Pérez scored a 11/24 on the AM-PAC short mobility form. Initial research suggests that an AM-PAC score of 18 or greater may be associated with a discharge to patient's home setting.  However in this initial research, cut off scores do not perfectly predict discharge

## 2019-05-10 NOTE — CARE COORDINATION
MARTHA received call from Simba Concepcion at [facility]. They will have a bed available for patient this weekend. [facility] discussed this with family and now family only wanting [facility]. Call Deborah Nails ([phone]) at [facility] for weekend discharge.   HENS done    Electronically signed by CHELSY Valenzuela on 5/10/2019 at 2:44 PM

## 2019-05-10 NOTE — PROGRESS NOTES
Patient requiring more oxygen to keep O2 levels up. Patient 83% on 6L. RT Dago at bedside and placing patient on high flow oxygen. Continuous pulse oximetry will be applied. No needs at this time. Patient resting comfortably in bed. Patient not currently c/o any SOB. Will perfect serve provider to make them aware. Call light in reach. Bed alarm on. Will continue to monitor.    Electronically signed by Mariposa Miles RN on 5/10/2019 at 5:36 AM

## 2019-05-10 NOTE — PLAN OF CARE
Pain/discomfort being managed with PRN analgesics per MD orders. Pt able to express presence and absence of pain and rate pain appropriately using numerical scale. Pt free from falls this shift. Fall precautions in place at all times. Call light always within reach. Pt able and agreeable to contact for safety appropriately.

## 2019-05-10 NOTE — CARE COORDINATION
Received call from [facility] at [facility]. They may have a bed available for patient today or over the weekend but would not be unable to accept on 6L of O2. Would need to be 5L or less. Karina to follow up with SW for determination. Electronically signed by CHELSY Dent on 5/10/2019 at 9:19 AM      Spoke with MD. Patient remains dc'ed for today. Call to [facility] at [facility]. She reports there are no beds available for today. Checking with team to determine if able to accept over the weekend. Met with patient and dtg to inform. Additional preference is [facility]. Referral initiated via Epic.  Will need HENS    Electronically signed by CHELSY Dent on 5/10/2019 at 1:27 PM

## 2019-05-10 NOTE — PROGRESS NOTES
sennosides-docusate sodium, albuterol sulfate HFA, diclofenac sodium, guaiFENesin, sodium chloride flush, ondansetron, acetaminophen, HYDROcodone 5 mg - acetaminophen      Intake/Output Summary (Last 24 hours) at 5/10/2019 1654  Last data filed at 5/10/2019 1256  Gross per 24 hour   Intake 1200 ml   Output 1850 ml   Net -650 ml       Physical Exam Performed:    BP (!) 91/55   Pulse 79   Temp 97.4 °F (36.3 °C) (Oral)   Resp 18   Ht 4' 11\" (1.499 m)   Wt 100 lb 5 oz (45.5 kg)   SpO2 97%   BMI 20.26 kg/m²     General appearance: No apparent distress, appears stated age and cooperative. HEENT: Pupils equal, round, and reactive to light. Conjunctivae/corneas clear. Neck: Supple, with full range of motion. No jugular venous distention. Trachea midline. Respiratory:  Normal respiratory effort. Diminished BS BL  Cardiovascular: irrgular rate and rhythm with normal S1/S2   Abdomen: Soft, non-tender, non-distended with normal bowel sounds. Musculoskeletal: R arm in slng  Skin: Skin color, texture, turgor normal.  No rashes or lesions. Neurologic:  Neurovascularly intact without any focal sensory/motor deficits. Cranial nerves: II-XII intact, grossly non-focal.  Psychiatric: Alert and oriented, thought content appropriate, normal insight  Capillary Refill: Brisk,< 3 seconds   Peripheral Pulses: +2 palpable, equal bilaterally       Labs:   Recent Labs     05/08/19  0645 05/09/19  0821   WBC 4.6  --    HGB 9.6* 9.9*   HCT 28.8* 29.7*   *  --      Recent Labs     05/08/19  0644 05/10/19  0910    138   K 5.3* 5.0    95*   CO2 28 27   BUN 55* 65*   CREATININE 1.5* 1.7*   CALCIUM 9.7 9.2   PHOS  --  4.3     No results for input(s): AST, ALT, BILIDIR, BILITOT, ALKPHOS in the last 72 hours. No results for input(s): INR in the last 72 hours. No results for input(s): Evern Suches in the last 72 hours.     Urinalysis:      Lab Results   Component Value Date    NITRU Negative 11/23/2018    WBCUA 160 04/06/2018    BACTERIA 3+ 04/06/2018    RBCUA 1 04/06/2018    BLOODU Negative 11/23/2018    SPECGRAV 1.008 11/23/2018    GLUCOSEU Negative 11/23/2018    GLUCOSEU NEGATIVE 02/15/2011       Radiology:  XR CHEST PORTABLE   Final Result   Findings similar to 03/15/2019. Bilateral pleural effusions with associated   atelectasis/pneumonia. Possible pulmonary edema. CT Head WO Contrast   Final Result   There is a defect involving the outer cortex of the left frontal bone. It is   unclear whether this is posttraumatic or postsurgical.  No acute intracranial   abnormality identified. Mild atrophy and chronic white matter changes, similar in appearance. Multilevel degenerative changes in the cervical spine. No acute osseous   abnormality. CT Cervical Spine WO Contrast   Final Result   There is a defect involving the outer cortex of the left frontal bone. It is   unclear whether this is posttraumatic or postsurgical.  No acute intracranial   abnormality identified. Mild atrophy and chronic white matter changes, similar in appearance. Multilevel degenerative changes in the cervical spine. No acute osseous   abnormality. XR HIP 2-3 VW W PELVIS RIGHT   Final Result   Right hip prosthesis is in normal alignment. No acute fracture is   appreciated. XR SHOULDER RIGHT (MIN 2 VIEWS)   Final Result   Acute comminuted mildly displaced fracture of the proximal right humerus, as   described above.                  Assessment/Plan:    Right Humerus Fracture  - ortho consulted, nonoperative  - sling  - pain control  - PT/OT     Right Hip Pain  - initial XR neg for fracture  - pain better controlled, moving with minimal pain  - PT/OT  - pain control    Acute on Chronic Resp Failure with Hypoxia  - requires 2L at home, down to 70% on 2L in hospital  - titrate oxygen to keep sats>90%  - encouraged IS  - on 3-5L now  - likely some fluid overlaod as well, diurese     Permanent Atrial Fib  - continue home meds     Fall  - possible mechanical vs a fib induced vs BP  - PT/OT     Thromboctopenia  - monitor  - no acute bleeding issues now     Anemia  - Hg 10  - with fractures will need to monitor  - stable  - transfuse <7     CKD Stage 3  - continue to monitor     Hypothryoidism  - continue home synthroid        DVT Prophylaxis: lovnox  Diet: Dietary Nutrition Supplements: Standard High Calorie Oral Supplement  DIET GENERAL; Low Sodium (2 GM);  Daily Fluid Restriction: 1500 ml  Code Status: DNR-CC    PT/OT Eval Status: SNF    Dispo - DC to snf    Avoidable day as we are awaiting a bed at    [facility]ona Ahn MD

## 2019-05-11 NOTE — CARE COORDINATION
LSW spoke with RN who states that patient will likely not be discharged today due to O2 requirement. WANDAW following.    Electronically signed by Candace Ulrich on 5/11/2019 at 11:17 AM

## 2019-05-11 NOTE — PROGRESS NOTES
Thomas and Wound care orders put in, speciality bed ordered. Patient tolerated wedge pillow less than 1 hour. Refuses to be turned.

## 2019-05-11 NOTE — PROGRESS NOTES
Hospitalist Progress Note      PCP: Harsh Pal MD    Date of Admission: 5/7/2019    Chief Complaint: [facility] Course: The patient is a 80 y.o. female who presents to [facility] with mechanical fall. She was on her way to another room when she decided to turn and look at the TV to see what the temp would be when she fell over landing on her right side. Patient was then unable to get up so EMS was called. In ED patient was found to have fractured right humerus along with a fib with RVR. Patient will need to be admitted for further intervention and workup. Second day of admission patient developed acute on chronic resp failure with hypoxia and uncontrolled HR. Subjective: Patient seen and examined. Discussed patient with pulmonology. Patient continues to need a lot of oxygen in order to keep her saturations above 90%. It appears as if there is no rhyme or reason as to why her oxygen demand continues to go up and down. Her repeat chest x-ray from yesterday did not show any acute pathology and was very similar to the one that she had done in March. Patient had a CT scan done last year which showed diffuse emphysema. I have asked pulmonology to stop by and assess the patient. He agreed that she likely has more fluid that needs to come off so I've increased her diuretics in hope of accomplishing this.   We will need to watch her kidney function as it is already borderline high    Medications:  Reviewed    Infusion Medications   Scheduled Medications    furosemide  40 mg Intravenous BID    sennosides-docusate sodium  2 tablet Oral BID    oyster shell calcium/vitamin D  1 tablet Oral Daily    docusate sodium  100 mg Oral Daily    pantoprazole  40 mg Oral QAM AC    mometasone-formoterol  2 puff Inhalation BID    levothyroxine  100 mcg Oral Daily    lisinopril  5 mg Oral Daily    midodrine  5 mg Oral TID WC    vitamin D  1,000 Units Oral Daily    sodium chloride flush  10 mL Intravenous 2 times per day    tiotropium  18 mcg Inhalation Daily    enoxaparin  30 mg Subcutaneous Daily     PRN Meds: ipratropium-albuterol, perflutren lipid microspheres, albuterol sulfate HFA, diclofenac sodium, guaiFENesin, sodium chloride flush, ondansetron, acetaminophen, HYDROcodone 5 mg - acetaminophen      Intake/Output Summary (Last 24 hours) at 5/11/2019 1341  Last data filed at 5/11/2019 0551  Gross per 24 hour   Intake 120 ml   Output 1100 ml   Net -980 ml       Physical Exam Performed:    BP 95/63   Pulse 105   Temp 98.3 °F (36.8 °C) (Oral)   Resp 18   Ht 4' 11\" (1.499 m)   Wt 101 lb 3.1 oz (45.9 kg)   SpO2 92%   BMI 20.44 kg/m²     General appearance: No apparent distress, appears stated age and cooperative. HEENT: Pupils equal, round, and reactive to light. Conjunctivae/corneas clear. Neck: Supple, with full range of motion. No jugular venous distention. Trachea midline. Respiratory:  Normal respiratory effort. Diminished BS BL  Cardiovascular: irrgular rate and rhythm with normal S1/S2   Abdomen: Soft, non-tender, non-distended with normal bowel sounds. Musculoskeletal: R arm in slng  Skin: Skin color, texture, turgor normal.  No rashes or lesions. Neurologic:  Neurovascularly intact without any focal sensory/motor deficits. Cranial nerves: II-XII intact, grossly non-focal.  Psychiatric: Alert and oriented, thought content appropriate, normal insight  Capillary Refill: Brisk,< 3 seconds   Peripheral Pulses: +2 palpable, equal bilaterally       Labs:   Recent Labs     05/09/19  0821   HGB 9.9*   HCT 29.7*     Recent Labs     05/10/19  0910      K 5.0   CL 95*   CO2 27   BUN 65*   CREATININE 1.7*   CALCIUM 9.2   PHOS 4.3     No results for input(s): AST, ALT, BILIDIR, BILITOT, ALKPHOS in the last 72 hours. No results for input(s): INR in the last 72 hours. No results for input(s): Blaire Dark in the last 72 hours.     Urinalysis:      Lab Results   Component Value Date NITRU Negative 11/23/2018    WBCUA 160 04/06/2018    BACTERIA 3+ 04/06/2018    RBCUA 1 04/06/2018    BLOODU Negative 11/23/2018    SPECGRAV 1.008 11/23/2018    GLUCOSEU Negative 11/23/2018    GLUCOSEU NEGATIVE 02/15/2011       Radiology:  XR CHEST PORTABLE   Final Result   Findings similar to 03/15/2019. Bilateral pleural effusions with associated   atelectasis/pneumonia. Possible pulmonary edema. CT Head WO Contrast   Final Result   There is a defect involving the outer cortex of the left frontal bone. It is   unclear whether this is posttraumatic or postsurgical.  No acute intracranial   abnormality identified. Mild atrophy and chronic white matter changes, similar in appearance. Multilevel degenerative changes in the cervical spine. No acute osseous   abnormality. CT Cervical Spine WO Contrast   Final Result   There is a defect involving the outer cortex of the left frontal bone. It is   unclear whether this is posttraumatic or postsurgical.  No acute intracranial   abnormality identified. Mild atrophy and chronic white matter changes, similar in appearance. Multilevel degenerative changes in the cervical spine. No acute osseous   abnormality. XR HIP 2-3 VW W PELVIS RIGHT   Final Result   Right hip prosthesis is in normal alignment. No acute fracture is   appreciated. XR SHOULDER RIGHT (MIN 2 VIEWS)   Final Result   Acute comminuted mildly displaced fracture of the proximal right humerus, as   described above.                  Assessment/Plan:    Right Humerus Fracture  - ortho consulted, nonoperative  - sling  - pain control  - PT/OT     Right Hip Pain  - initial XR neg for fracture  - pain better controlled, moving with minimal pain  - PT/OT  - pain control    Acute on Chronic Resp Failure with Hypoxia  - requires 2L at home, down to 70% on 2L in hospital  - titrate oxygen to keep sats>90%  - encouraged IS  - on 14L  - likely some fluid overlaod as well,

## 2019-05-11 NOTE — PROGRESS NOTES
Pt refusing to turn side to side. Nurse explained benefits and risk of turning vs not turning. Pt verbalized understanding. Pt on air mattress. Fall precautions in place. Call light with in reach. Will cont to monitor. Electronically signed by Yanique Hernández RN on 5/11/2019 at 4:22 PM

## 2019-05-11 NOTE — PLAN OF CARE
Problem: Falls - Risk of:  Goal: Absence of physical injury  Description  Absence of physical injury  Outcome: Ongoing  Note:   Pt free from falls this shift. Fall precautions in place at all times. Call light always within reach. Pt able and agreeable to contact for safety appropriately. Problem: Pain:  Goal: Control of acute pain  Description  Control of acute pain  Outcome: Ongoing  Note:   Pain/discomfort being managed with PRN analgesics per MD orders. Pt able to express presence and absence of pain and rate pain appropriately using numerical scale. Problem: Urinary Elimination:  Goal: Signs and symptoms of infection will decrease  Description  Signs and symptoms of infection will decrease  Outcome: Ongoing     Problem: Urinary Elimination:  Goal: Complications related to the disease process, condition or treatment will be avoided or minimized  Description  Complications related to the disease process, condition or treatment will be avoided or minimized  Outcome: Ongoing  Note:   Catheter site will be kept clean and dry. Eason will be removed when no longer medically necessary     Problem: Safety:  Goal: Free from intentional harm  Description  Free from intentional harm  Outcome: Ongoing  Note:   Pt free from falls this shift. Fall precautions in place at all times. Call light always withinreach. Pt able and agreeable to contact for safety appropriately. Bed alarm on.       Problem: HEMODYNAMIC STATUS  Goal: Patient has stable vital signs and fluid balance  Outcome: Ongoing  Note:   Fluid restriction in place labs and strict I&O will be monitored daily     Problem: FLUID AND ELECTROLYTE IMBALANCE  Goal: Fluid and electrolyte balance are achieved/maintained  Outcome: Ongoing  Note:   Fluid restriction in place labs and strict I&O will be monitored daily     Problem: ACTIVITY INTOLERANCE/IMPAIRED MOBILITY  Goal: Mobility/activity is maintained at optimum level for patient  Outcome: Ongoing

## 2019-05-11 NOTE — CONSULTS
7/2012    PARATHYROIDECTOMY      MI LEFT HEART CATH,PERCUTANEOUS      Cardiac Cath, Left Heart       Family Hx  family history includes Allergies in her brother; Asthma in her brother; Cancer in her brother, brother, and father; Hearing Loss in her father; Heart Disease in her mother; Other in her brother; Parkinsonism in her brother. Social Hx   reports that she quit smoking about 38 years ago. Her smoking use included cigarettes. She has never used smokeless tobacco.    Scheduled Meds:   oyster shell calcium/vitamin D  1 tablet Oral Daily    docusate sodium  100 mg Oral Daily    pantoprazole  40 mg Oral QAM AC    mometasone-formoterol  2 puff Inhalation BID    levothyroxine  100 mcg Oral Daily    lisinopril  5 mg Oral Daily    midodrine  5 mg Oral TID WC    torsemide  20 mg Oral Once per day on Sun Tue Thu Sat    torsemide  40 mg Oral Once per day on Mon Wed Fri    vitamin D  1,000 Units Oral Daily    sodium chloride flush  10 mL Intravenous 2 times per day    tiotropium  18 mcg Inhalation Daily    enoxaparin  30 mg Subcutaneous Daily       Continuous Infusions:      PRN Meds:  ipratropium-albuterol, sennosides-docusate sodium, albuterol sulfate HFA, diclofenac sodium, guaiFENesin, sodium chloride flush, ondansetron, acetaminophen, HYDROcodone 5 mg - acetaminophen    ALLERGIES:  Patient is allergic to adhesive tape.     REVIEW OF SYSTEMS:  Constitutional: Negative for fever  HENT: Negative for sore throat  Eyes: Negative for redness   Respiratory: Negative for dyspnea, cough  Cardiovascular: Negative for chest pain  Gastrointestinal: Negative for vomiting, diarrhea   Genitourinary: Negative for hematuria   Musculoskeletal: Negative for arthralgias   Skin: Negative for rash  Neurological: Negative for syncope  Hematological: Negative for adenopathy  Psychiatric/Behavorial: Negative for anxiety    Objective:   PHYSICAL EXAM:  Blood pressure 91/60, pulse 103, temperature 98.7 °F (37.1 °C), temperature source Oral, resp. rate 18, height 4' 11\" (1.499 m), weight 101 lb 3.1 oz (45.9 kg), SpO2 91 %, not currently breastfeeding.'  Gen:  No acute distress. Frail   Eyes: PERRL. Anicteric sclera. No conjunctival injection. ENT: No discharge. Posterior oropharynx clear. External appearance of ears and nose normal.  Neck: Trachea midline. No mass   Resp:  +crackles. No wheezes. No rhonchi. No dullness on percussion. CV: Regular rate. Regular rhythm. No murmur or rub. No edema. GI: Soft, Non-tender. Non-distended. +BS  Skin: Warm, dry, w/o erythema. Lymph: No cervical or supraclavicular LAD. M/S: + cyanosis b/l finger tips. No clubbing. Neuro:   no focal neurologic deficit. Moves all extremities  Psych: Awake and alert, Oriented . Judgement and insight appropriate. Mood stable. Data Reviewed:   LABS:  CBC:   Recent Labs     05/09/19  0821   HGB 9.9*   HCT 29.7*     BMP:   Recent Labs     05/10/19  0910      K 5.0   CL 95*   CO2 27   PHOS 4.3   BUN 65*   CREATININE 1.7*     LIVER PROFILE: No results for input(s): AST, ALT, LIPASE, BILIDIR, BILITOT, ALKPHOS in the last 72 hours. Invalid input(s): AMYLASE,  ALB  PT/INR: No results for input(s): PROTIME, INR in the last 72 hours. APTT: No results for input(s): APTT in the last 72 hours. UA:No results for input(s): NITRITE, COLORU, PHUR, LABCAST, WBCUA, RBCUA, MUCUS, TRICHOMONAS, YEAST, BACTERIA, CLARITYU, SPECGRAV, LEUKOCYTESUR, UROBILINOGEN, BILIRUBINUR, BLOODU, GLUCOSEU, AMORPHOUS in the last 72 hours. Invalid input(s): KETONESU  No results for input(s): PHART, IKN9XOO, PO2ART in the last 72 hours. Vent Information  Skin Assessment: Clean, dry, & intact    Radiology Review:  Pertinent images / reports were reviewed as a part of this visit.     CXR reviewed by me: diffuse reticulations with peripheral predominance, has b/l small effusions    CT Chest w/o contrast:   Results for orders placed during the hospital encounter of 10/13/18   CT CHEST WO CONTRAST    Narrative EXAMINATION:  CT OF THE CHEST WITHOUT CONTRAST 10/13/2018 4:21 pm    TECHNIQUE:  CT of the chest was performed without the administration of intravenous  contrast. Multiplanar reformatted images are provided for review. Dose  modulation, iterative reconstruction, and/or weight based adjustment of the  mA/kV was utilized to reduce the radiation dose to as low as reasonably  achievable. COMPARISON:  09/13/2017    HISTORY:  ORDERING SYSTEM PROVIDED HISTORY: sob, hypoxia,  TECHNOLOGIST PROVIDED HISTORY:  Ordering Physician Provided Reason for Exam: sob, hypoxia  Acuity: Acute  Type of Exam: Initial    FINDINGS:  Mediastinum: No mediastinal or axillary lymphadenopathy is identified. The  thoracic aorta is borderline dilated, unchanged when compared to the previous  exam.  Likewise, the main pulmonary artery is mildly dilated, also unchanged,  measuring 3.1 cm. No axillary or subpectoral lymphadenopathy is detected. Cardiac chambers are enlarged. Lungs/pleura: There are moderate bilateral pleural effusions. Adjacent  airspace disease is detected. Mild ground-glass opacity is detected within  the lungs is well, with more focal infiltrates seen within the right middle  lobe. Severe emphysema is again noted. Upper Abdomen: Images the upper abdomen are unremarkable in appearance. Soft Tissues/Bones: Multiple compression fractures are seen throughout the  thoracic spine, similar when compared to the previous CT PA from 09/13/2017. Impression Moderate bilateral pleural effusions with adjacent airspace disease, with  alveolar ground-glass opacity identified elsewhere within the lungs. Findings are most compatible with pulmonary edema. Correlate with any  clinical evidence of superimposed pneumonia or aspiration. CTPA: No results found for this or any previous visit.     CXR PA/LAT:   Results for orders placed during the hospital encounter of 09/18/18 XR CHEST STANDARD (2 VW)    Narrative EXAMINATION:  TWO VIEWS OF THE CHEST    9/20/2018 1:30 pm    COMPARISON:  09/18/2018 radiograph    HISTORY:  ORDERING PHYSICIAN PROVIDED HISTORY: chf  TECHNOLOGIST PROVIDED HISTORY:  Portable if pt not mobile  Technologist Provided Reason for Exam: sob  Acuity: Chronic  Type of Encounter: Ongoing  Additional signs and symptoms: On 2L of O2 all the time    FINDINGS:  The heart is enlarged. The mediastinum is normal.  There are atherosclerotic  changes of the aortic arch. Mild perihilar opacities are stable. Slightly  increased volume of a small right pleural effusion. Stable small left  pleural effusion. Associated airspace changes in both lower lobes. Upper  lungs demonstrate chronic changes of COPD. No skeletal abnormality. Impression CHF and pulmonary edema. Slight increased volume of a small right pleural  effusion. CXR portable:   Results for orders placed during the hospital encounter of 05/07/19   XR CHEST PORTABLE    Narrative EXAMINATION:  ONE XRAY VIEW OF THE CHEST    5/10/2019 9:12 am    COMPARISON:  Portable chest 03/15/2019. HISTORY:  ORDERING SYSTEM PROVIDED HISTORY: SOB  TECHNOLOGIST PROVIDED HISTORY:  Reason for exam:->SOB  Ordering Physician Provided Reason for Exam: shortness of breath  Acuity: Acute  Type of Exam: Initial    FINDINGS:  The chest is similar in appearance compared to 03/15/2019. There is mild  cardiopericardial enlargement. The projection is kyphotic, and apices are  partially obscured. Bilateral pleural effusions with associated basilar  atelectasis/pneumonia. Unchanged appearance of the pulmonary vasculature. Right humeral fracture. Impression Findings similar to 03/15/2019. Bilateral pleural effusions with associated  atelectasis/pneumonia. Possible pulmonary edema.        ECHO 11/2018  Left Ventricle   Suboptimal image quality.   Patient appears to be in atrial fibrillation.   Overall left ventricular systolic function appears moderately reduced with   an ejection fraction around 40%. Abnormal (paradoxical) septal motion is   present. Cannot exclude other wall motion abnormalities secondary to poor   endocardial definition.   Normal left ventricular wall thickness and cavity size. Assessment:       Acute on chronic Hypoxemic Respiratory Failure with SAO2 <90% on Room Air  Panlobular Emphysema  R humeral Fracture  CKD    Plan:      -unclear to me what exactly her o2 demand is, supplemental oxygen is being titrated up aggressively despite lack of documented hypoxic events. Her bedside pulseox is continuously w/o proper pleth waveform so unclear even if there have been episodes of hypoxia if they are real.  Would not titrate o2 based on pulseox with poor reading. If concerned about hypoxia may need to get ABG to correlate pO2.  -Wean supplemental oxygen to goal saturation of >/=90%  -BNP is elevated and CXR c/w fluid overload, cont iV diuresis, follow cr  -Echo w/bubble to evaluate for possible shunt given report h/o severe pHTN  -dulera/spiriva  -prn albuterol     This note was transcribed using [id number] [employer] English. Please disregard any translational errors.     Thank you for the consult    [address]  Pulmonary, Sleep and Critical Care Medicine

## 2019-05-12 NOTE — CONSULTS
[facility]  Consult Note           Reason for Consult:  NANCY/CKD  Requesting Physician:  Dr. Jase Kilpatrick    History Obtained From:  patient, electronic medical record    History of Present Ilness:    81 y/o WF with h/o multiple medical problems including CHF, A fib, COPD on home O2  And CKD with baseline Cr ~ 1.3 mg admitted with a mechanical fall  At home and diagnosed with R Humeral neck fracture Also noted to be in A Fib with RVR  She was transferred to ICU last night due to worsening hypoxia, hypotension  Has ch Hypotension and is on midodrine  Cr was 1.5 mg on presentation and increased to 2.8 mg yesterday BUN 94 mg and K= elevated at 6 meq  Received IVF's and Kayexalate overnight and Cr has improved to 2.1 mg K+ 4.9 MEQ  Had been on nutritional supplement and Lisinopril  Remains on high madan O2  ECHO with bubble study shows R to L shunt  CXR suggesting possible mucous plug   Denies any urinary symptoms. No dysuria hematuria flank pains  Had been on Lisinopril and Torsemide      Past Medical History:        Diagnosis Date    Atrial fibrillation (HCC)     C. difficile colitis     history of/  negative specimen 4/12/2018    Cancer [facility] [address]     breast left, skin     Cardiomyopathy ([facility] [address]     Chemotherapy     CHF (congestive heart failure) ([address]     COPD (chronic obstructive pulmonary disease) (HCC)     Dependence on supplemental oxygen     2LNC    Hypertension     Hypothyroidism     Osteoarthritis     Rosacea        Past Surgical History:        Procedure Laterality Date    BREAST SURGERY      Left breast lumpectomy    BUNIONECTOMY Right     CATARACT REMOVAL Bilateral     COLONOSCOPY  2011    EYE SURGERY      FOOT SURGERY      toe    HIP ARTHROPLASTY Right 09/12/2017    right bipolar hip    MOHS SURGERY  7/2012    PARATHYROIDECTOMY      [place] LEFT HEART CATH,PERCUTANEOUS      Cardiac Cath, Left Heart       Home Medications:    No current facility-administered medications on file prior to encounter. Current Outpatient Medications on File Prior to Encounter   Medication Sig Dispense Refill    torsemide (DEMADEX) 20 MG tablet Take 20 mg by mouth four times a week TUE/THUR/SAT/SUN      torsemide (DEMADEX) 20 MG tablet Take 40 mg by mouth three times a week 40 MG mon/wed/fri      lisinopril (PRINIVIL;ZESTRIL) 5 MG tablet Take 1 tablet by mouth daily 30 tablet 3    midodrine (PROAMATINE) 5 MG tablet Take 1 tablet by mouth 3 times daily (with meals) 90 tablet 0    methylcellulose (CITRUCEL) 500 MG TABS Take 2 tablets by mouth daily      esomeprazole (NEXIUM) 20 MG delayed release capsule Take 20 mg by mouth Daily with supper      diclofenac sodium 1 % GEL Apply 2 g topically 4 times daily as needed for Pain (Back and left hifp)       tiotropium (SPIRIVA RESPIMAT) 1.25 MCG/ACT AERS inhaler Inhale 2 puffs into the lungs daily 1 Inhaler 3    albuterol sulfate HFA (PROAIR HFA) 108 (90 Base) MCG/ACT inhaler Inhale 2 puffs into the lungs every 4 hours as needed for Wheezing or Shortness of Breath 1 Inhaler 3    docusate sodium (COLACE) 100 MG capsule Take 100 mg by mouth daily      Probiotic Product (PROBIOTIC DAILY PO) Take 1 tablet by mouth daily as needed (antibiotic use)       Misc Natural Products (OSTEO BI-FLEX JOINT SHIELD) TABS Take 1 tablet by mouth daily      BOSWELLIA MESFIN PO Take 1 tablet by mouth daily       COLLAGEN PO Take 10 mg by mouth daily      levothyroxine (SYNTHROID) 100 MCG tablet Take 1 tablet by mouth Daily 30 tablet 0    guaiFENesin (MUCINEX) 600 MG extended release tablet Take 600 mg by mouth 2 times daily as needed for Congestion       fluticasone-salmeterol (ADVAIR HFA) 115-21 MCG/ACT inhaler Inhale 2 puffs into the lungs 2 times daily      Calcium Carb-Cholecalciferol (CALCIUM-VITAMIN D) 600-400 MG-UNIT TABS Take 1 tablet by mouth daily      metroNIDAZOLE (METROGEL) 0.75 % gel Apply topically 2 times daily Apply to face 2 times daily.       vitamin D (CHOLECALCIFEROL) 1000 units TABS tablet Take 1,000 Units by mouth daily       HYDROcodone-acetaminophen (NORCO) 5-325 MG per tablet Take 1 tablet by mouth every 8 hours as needed for Pain. [place] OXYGEN 2 L/min continuous       Multiple Vitamins-Minerals (MULTI FOR HER 50+ PO) Take 1 tablet by mouth daily          Allergies:  Adhesive tape    Social History:    Social History     Socioeconomic History    Marital status:       Spouse name: Not on file    Number of children: Not on file    Years of education: Not on file    Highest education level: Not on file   Occupational History    Occupation: retired   Social Needs    Financial resource strain: Not on file    Food insecurity:     Worry: Not on file     Inability: Not on file   [employer] needs:     Medical: Not on file     Non-medical: Not on file   Tobacco Use    Smoking status: Former Smoker     Types: Cigarettes     Last attempt to quit: 1980     Years since quittin.4    Smokeless tobacco: Never Used   Substance and Sexual Activity    Alcohol use: No    Drug use: No    Sexual activity: Not Currently   Lifestyle    Physical activity:     Days per week: Not on file     Minutes per session: Not on file    Stress: Not on file   Relationships    Social connections:     Talks on phone: Not on file     Gets together: Not on file     Attends Yarsanism service: Not on file     Active member of club or organization: Not on file     Attends meetings of clubs or organizations: Not on file     Relationship status: Not on file    Intimate partner violence:     Fear of current or ex partner: Not on file     Emotionally abused: Not on file     Physically abused: Not on file     Forced sexual activity: Not on file   Other Topics Concern    Not on file   Social History Narrative    Not on file       Family History:   Family History   Problem Relation Age of Onset    Heart Disease Mother     Cancer Father     Hearing Loss Father  Cancer Brother         bladder    Allergies Brother     Other Brother         GI issues, unknown    Cancer Brother         skin    Parkinsonism Brother     Asthma Brother        Review of Systems:   Pertinent positives stated above in HPI. All other systems were reviewed and were negative.     Physical exam:   Constitutional:  VITALS:  BP (!) 90/44   Pulse 110   Temp 98.6 °F (37 °C) (Oral)   Resp 26   Ht 4' 11\" (1.499 m)   Wt 108 lb 0.4 oz (49 kg)   SpO2 93%   BMI 21.82 kg/m²   Gen: alert, awake, elderly on hi madan O2  Skin: no rash, turgor wnl  Heent:  eomi, mmm  Neck: no bruits or jvd noted, thyroid normal  Cardiovascular:  S1, S2 without m/r/g  Respiratory:few crackles L base   Abdomen:  +bs, soft, nt, nd, no hepatosplenomegaly  Ext: no lower extremity edema R arm in sling   Psychiatric: mood and affect appropriate; judgement and insight intact      Data/  CBC:   Lab Results   Component Value Date    WBC 11.6 05/11/2019    RBC 3.08 05/11/2019    HGB 9.4 05/11/2019    HCT 27.9 05/11/2019    MCV 90.5 05/11/2019    MCH 30.6 05/11/2019    MCHC 33.8 05/11/2019    RDW 16.4 05/11/2019     05/11/2019    MPV 8.1 05/11/2019     BMP:    Lab Results   Component Value Date     05/12/2019    K 4.9 05/12/2019     05/12/2019    CO2 22 05/12/2019    BUN 75 05/12/2019    LABALBU 3.2 05/10/2019    CREATININE 2.1 05/12/2019    CALCIUM 7.9 05/12/2019    GFRAA 27 05/12/2019    GFRAA >60 05/24/2011    LABGLOM 22 05/12/2019    GLUCOSE 73 05/12/2019         Assessment/  1-NANCY/CKD baseline Cr 1.3 mg Suspect intravascular volume depletion, relative hypotension in the setting of ACE-I therapy Cr improving with IVF's and Improvement in BP  2-Hyperkalemia multifactorial, due to NANCY, ACE-I and nutritional supplement  3-CHF acute systolic/diastolic  4 R to L shunt with pul HTN  5 A Fib with RVR   6 R Humeral fracture  7 COPD on home O2 Possible Pneumonia/mucous plugging    Plan/  1-Keep off of ACE-I  2-No further IVF's  3-Continue to keep SBP > 90  4 Continue Midodrine  5 Low K+ diet Change supplement to Nepro  6 Check U/A  7 Hold diuretics at this time    Thank you for the consultation. Please do not hesitate to call with questions.     Bishop Gamez MD, [address]

## 2019-05-12 NOTE — PROGRESS NOTES
0800- Pt in bed, AOX4, VSS on Vapotherm with 25lpm/100% FiO2. NS infusing at 100ml/hr. 4428- Dr. Trish Herrera rounding updates provided and new orders noted. NS stopped per orders. 12- Dr. Lula Simmonds at bedside,updates given and new orders noted  1200-  at bedside, updates provided. 1625- No change in reassessment. VSS on Vapotherm 25lpm/100% Fio2. Pt's son at bedside at bedside, updated on pt's status. 65- Dr. Nani Gross at bedside, discussing POC extensively with pt, pt's son Ata Laughlin) and daughter Alex Carlos) on the phone. See MD's note. 1800- Pt calm and quiet in bed. VSS on Vapotherm. 1915- Handoff completed with LILIANA Eastman.

## 2019-05-12 NOTE — PROGRESS NOTES
NP did see the patient and talked to the daughter as well and they are in agreement with moving to ICU.

## 2019-05-12 NOTE — PROGRESS NOTES
hours) at 5/12/2019 0936  Last data filed at 5/12/2019 0858  Gross per 24 hour   Intake 551 ml   Output 965 ml   Net -414 ml       Physical Exam Performed:    BP (!) 93/52   Pulse 118   Temp 98.6 °F (37 °C) (Oral)   Resp 26   Ht 4' 11\" (1.499 m)   Wt 108 lb 0.4 oz (49 kg)   SpO2 92%   BMI 21.82 kg/m²     General appearance:+resp distress  HEENT: Pupils equal, round, and reactive to light. Conjunctivae/corneas clear. Neck: Supple, with full range of motion. No jugular venous distention. Trachea midline. Respiratory:  Normal respiratory effort. Diminished BS BL  Cardiovascular: irrgular rate and rhythm with normal S1/S2   Abdomen: Soft, non-tender, non-distended with normal bowel sounds. Musculoskeletal: R arm in slng  Skin: Skin color, texture, turgor normal.  No rashes or lesions. Neurologic:  Neurovascularly intact without any focal sensory/motor deficits. Cranial nerves: II-XII intact, grossly non-focal.  Psychiatric: Alert and oriented, thought content appropriate, normal insight  Capillary Refill: Brisk,< 3 seconds   Peripheral Pulses: +2 palpable, equal bilaterally       Labs:   Recent Labs     05/11/19  2359   WBC 11.6*   HGB 9.4*   HCT 27.9*        Recent Labs     05/10/19  0910 05/11/19  2359 05/12/19  0744    136 137   K 5.0 6.0* 4.9   CL 95* 91* 100   CO2 27 27 22   BUN 65* 94* 75*   CREATININE 1.7* 2.8* 2.1*   CALCIUM 9.2 9.3 7.9*   PHOS 4.3  --   --      No results for input(s): AST, ALT, BILIDIR, BILITOT, ALKPHOS in the last 72 hours. No results for input(s): INR in the last 72 hours. No results for input(s): Allison Aguilar in the last 72 hours.     Urinalysis:      Lab Results   Component Value Date    NITRU Negative 11/23/2018    WBCUA 160 04/06/2018    BACTERIA 3+ 04/06/2018    RBCUA 1 04/06/2018    BLOODU Negative 11/23/2018    SPECGRAV 1.008 11/23/2018    GLUCOSEU Negative 11/23/2018    GLUCOSEU NEGATIVE 02/15/2011       Radiology:  XR CHEST PORTABLE   Final Result Findings similar to 03/15/2019. Bilateral pleural effusions with associated   atelectasis/pneumonia. Possible pulmonary edema. CT Head WO Contrast   Final Result   There is a defect involving the outer cortex of the left frontal bone. It is   unclear whether this is posttraumatic or postsurgical.  No acute intracranial   abnormality identified. Mild atrophy and chronic white matter changes, similar in appearance. Multilevel degenerative changes in the cervical spine. No acute osseous   abnormality. CT Cervical Spine WO Contrast   Final Result   There is a defect involving the outer cortex of the left frontal bone. It is   unclear whether this is posttraumatic or postsurgical.  No acute intracranial   abnormality identified. Mild atrophy and chronic white matter changes, similar in appearance. Multilevel degenerative changes in the cervical spine. No acute osseous   abnormality. XR HIP 2-3 VW W PELVIS RIGHT   Final Result   Right hip prosthesis is in normal alignment. No acute fracture is   appreciated. XR SHOULDER RIGHT (MIN 2 VIEWS)   Final Result   Acute comminuted mildly displaced fracture of the proximal right humerus, as   described above.                  Assessment/Plan:    Right Humerus Fracture  - ortho consulted, nonoperative  - sling  - pain control  - PT/OT     Right Hip Pain  - initial XR neg for fracture  - pain better controlled, moving with minimal pain  - PT/OT  - pain control    Acute on Chronic Resp Failure with Hypoxia  - requires 2L at home, down to 70% on 2L in hospital  - titrate oxygen to keep sats>90%  - encouraged IS  - on 25L  - likely some fluid overlaod as well, cannot diurese as worsening ARF  - CXR similar to previous  - previous CT scan reviewed as well    CKD Stage 3, now with ARF  - continue to monitor  - hold IVF and lasix     Acute on Chronic Systolic CHF  - EF of 59%  - having issues diuresing  - Cards

## 2019-05-12 NOTE — PROGRESS NOTES
4 Eyes Skin Assessment     The patient is being assess for  Shift Handoff    I agree that 2 RN's have performed a thorough Head to Toe Skin Assessment on the patient. ALL assessment sites listed below have been assessed. Areas assessed by both nurses:   [x]   Head, Face, and Ears   [x]   Shoulders, Back, and Chest  [x]   Arms, Elbows, and Hands   [x]   Coccyx, Sacrum, and IschIum  [x]   Legs, Feet, and Heels        Does the Patient have Skin Breakdown?   Yes LDA WOUND CARE was Initiated documentation include the Mirella-wound, Wound Assessment, Measurements, Dressing Treatment, Drainage, and Color\",         Thomas Prevention initiated:  Yes   Wound Care Orders initiated:  Yes      [id number]  nurse consulted for Pressure Injury (Stage 3,4, Unstageable, DTI, NWPT, and Complex wounds), New and Established Ostomies:  Yes      Nurse 1 eSignature: Electronically signed by Raina Nava RN on 5/12/19 at 7:33 PM    **SHARE this note so that the co-signing nurse is able to place an eSignature**    Nurse 2 eSignature: Electronically signed by Marilin Bowman RN on 5/12/19 at 10:05 PM

## 2019-05-12 NOTE — PROGRESS NOTES
4 Eyes Skin Assessment     The patient is being assess for  Transfer to New Unit    I agree that 2 RN's have performed a thorough Head to Toe Skin Assessment on the patient. ALL assessment sites listed below have been assessed. Areas assessed by both nurses: Radha Alaniz RN  [x]   Head, Face, and Ears   [x]   Shoulders, Back, and Chest  [x]   Arms, Elbows, and Hands   [x]   Coccyx, Sacrum, and IschIum  [x]   Legs, Feet, and Heels        Does the Patient have Skin Breakdown?   Yes LDA WOUND CARE was Initiated documentation include the Mirella-wound, Wound Assessment, Measurements, Dressing Treatment, Drainage, and Color\",         Thomas Prevention initiated:  Yes   Wound Care Orders initiated:  Yes      [id number]  nurse consulted for Pressure Injury (Stage 3,4, Unstageable, DTI, NWPT, and Complex wounds), New and Established Ostomies:  Yes      Nurse 1 eSignature: Electronically signed by Zaira Lao RN on 5/12/19 at 6:06 AM    **SHARE this note so that the co-signing nurse is able to place an eSignature**    Nurse 2 eSignature: Electronically signed by Rudi Hall RN on 5/12/19 at 6:39 AM

## 2019-05-12 NOTE — PROGRESS NOTES
4 Eyes Skin Assessment     The patient is being assess for  Shift Handoff    I agree that 2 RN's have performed a thorough Head to Toe Skin Assessment on the patient. ALL assessment sites listed below have been assessed. Areas assessed by both nurses:  Spoon to Sherri Gamble RN  [x]   Head, Face, and Ears   [x]   Shoulders, Back, and Chest  [x]   Arms, Elbows, and Hands   [x]   Coccyx, Sacrum, and IschIum  [x]   Legs, Feet, and Heels        Does the Patient have Skin Breakdown?   Yes LDA WOUND CARE was Initiated documentation include the Mirella-wound, Wound Assessment, Measurements, Dressing Treatment, Drainage, and Color\",         Thomas Prevention initiated:  Yes   Wound Care Orders initiated:  Yes      [id number]  nurse consulted for Pressure Injury (Stage 3,4, Unstageable, DTI, NWPT, and Complex wounds), New and Established Ostomies:  Yes      Nurse 1 eSignature: Electronically signed by Lou Lawton RN on 5/12/19 at 7:22 AM    **SHARE this note so that the co-signing nurse is able to place an eSignature**    Nurse 2 eSignature: Electronically signed by Medina Hays RN on 5/12/19 at 8:49 AM

## 2019-05-12 NOTE — PROGRESS NOTES
Pulmonary Progress Note    Date of Admission: 5/7/2019   LOS: 5 days     CC:  Chief Complaint   Patient presents with    Fall     Patient states she got up this morning to go to the bathroom and adjust the heat and fell over her walker. Patient c/o right shoulder and right hip pain. HPI/Subjective  Overnight transferred to ICU for hypotension and inc o2 demands, currently on vapotherm but denies dyspnea  ECHo with bubble showed cardiac shunt  Cr continues to climb    ROS:   No nausea  No Vomiting  No chest pain      Intake/Output Summary (Last 24 hours) at 5/12/2019 0950  Last data filed at 5/12/2019 0858  Gross per 24 hour   Intake 551 ml   Output 965 ml   Net -414 ml         PHYSICAL EXAM:   Blood pressure (!) 93/52, pulse 118, temperature 98.6 °F (37 °C), temperature source Oral, resp. rate 26, height 4' 11\" (1.499 m), weight 108 lb 0.4 oz (49 kg), SpO2 92 %, not currently breastfeeding.'  Gen:  No acute distress. Very thin  Eyes: PERRL. Anicteric sclera. No conjunctival injection. ENT: No discharge. Posterior oropharynx clear. External appearance of ears and nose normal.  Neck: Trachea midline. No mass   Resp:  b/l crackles. No wheezes. No rhonchi. No dullness on percussion. CV: Regular rate. Regular rhythm. No murmur or rub. No edema. GI: Soft, Non-tender. Non-distended. +BS  Skin: Warm, dry, w/o erythema. Lymph: No cervical or supraclavicular LAD. M/S: +cyanosis. No clubbing. R UE sling  Neuro: no focal neurologic deficit. Moves all extremities  Psych: Awake and alert,. Judgement and insight appropriate. Mood stable.       Medications:    Scheduled Meds:   lactobacillus  1 capsule Oral BID WC    ipratropium-albuterol  1 ampule Inhalation Q4H WA    cefepime  1 g Intravenous Q24H    sennosides-docusate sodium  2 tablet Oral BID    oyster shell calcium/vitamin D  1 tablet Oral Daily    docusate sodium  100 mg Oral Daily    pantoprazole  40 mg Oral QAM AC    mometasone-formoterol  2 puff Inhalation BID    levothyroxine  100 mcg Oral Daily    midodrine  5 mg Oral TID     vitamin D  1,000 Units Oral Daily    sodium chloride flush  10 mL Intravenous 2 times per day    enoxaparin  30 mg Subcutaneous Daily       Continuous Infusions:      PRN Meds:  albuterol sulfate HFA, perflutren lipid microspheres, diclofenac sodium, guaiFENesin, sodium chloride flush, ondansetron, acetaminophen, HYDROcodone 5 mg - acetaminophen    Labs reviewed:  CBC:   Recent Labs     05/11/19  2359   WBC 11.6*   HGB 9.4*   HCT 27.9*   MCV 90.5        BMP:   Recent Labs     05/10/19  0910 05/11/19  2359 05/12/19  0744    136 137   K 5.0 6.0* 4.9   CL 95* 91* 100   CO2 27 27 22   PHOS 4.3  --   --    BUN 65* 94* 75*   CREATININE 1.7* 2.8* 2.1*     LIVER PROFILE: No results for input(s): AST, ALT, LIPASE, BILIDIR, BILITOT, ALKPHOS in the last 72 hours. Invalid input(s): AMYLASE,  ALB  PT/INR: No results for input(s): PROTIME, INR in the last 72 hours. APTT: No results for input(s): APTT in the last 72 hours. UA:No results for input(s): NITRITE, COLORU, PHUR, LABCAST, WBCUA, RBCUA, MUCUS, TRICHOMONAS, YEAST, BACTERIA, CLARITYU, SPECGRAV, LEUKOCYTESUR, UROBILINOGEN, BILIRUBINUR, BLOODU, GLUCOSEU, AMORPHOUS in the last 72 hours. Invalid input(s): Radha Pallas  No results for input(s): PH, PCO2, PO2 in the last 72 hours. Films:  Radiology Review:  Pertinent images / reports were reviewed as a part of this visit. CT Chest w/ contrast: No results found for this or any previous visit. CT Chest w/o contrast:   Results for orders placed during the hospital encounter of 10/13/18   CT CHEST WO CONTRAST    Narrative EXAMINATION:  CT OF THE CHEST WITHOUT CONTRAST 10/13/2018 4:21 pm    TECHNIQUE:  CT of the chest was performed without the administration of intravenous  contrast. Multiplanar reformatted images are provided for review.  Dose  modulation, iterative reconstruction, and/or weight based adjustment of the  mA/kV was utilized to reduce the radiation dose to as low as reasonably  achievable. COMPARISON:  09/13/2017    HISTORY:  ORDERING SYSTEM PROVIDED HISTORY: sob, hypoxia,  TECHNOLOGIST PROVIDED HISTORY:  Ordering Physician Provided Reason for Exam: sob, hypoxia  Acuity: Acute  Type of Exam: Initial    FINDINGS:  Mediastinum: No mediastinal or axillary lymphadenopathy is identified. The  thoracic aorta is borderline dilated, unchanged when compared to the previous  exam.  Likewise, the main pulmonary artery is mildly dilated, also unchanged,  measuring 3.1 cm. No axillary or subpectoral lymphadenopathy is detected. Cardiac chambers are enlarged. Lungs/pleura: There are moderate bilateral pleural effusions. Adjacent  airspace disease is detected. Mild ground-glass opacity is detected within  the lungs is well, with more focal infiltrates seen within the right middle  lobe. Severe emphysema is again noted. Upper Abdomen: Images the upper abdomen are unremarkable in appearance. Soft Tissues/Bones: Multiple compression fractures are seen throughout the  thoracic spine, similar when compared to the previous CT PA from 09/13/2017. Impression Moderate bilateral pleural effusions with adjacent airspace disease, with  alveolar ground-glass opacity identified elsewhere within the lungs. Findings are most compatible with pulmonary edema. Correlate with any  clinical evidence of superimposed pneumonia or aspiration. CTPA: No results found for this or any previous visit.     CXR PA/LAT:   Results for orders placed during the hospital encounter of 09/18/18   XR CHEST STANDARD (2 VW)    Narrative EXAMINATION:  TWO VIEWS OF THE CHEST    9/20/2018 1:30 pm    COMPARISON:  09/18/2018 radiograph    HISTORY:  ORDERING PHYSICIAN PROVIDED HISTORY: chf  TECHNOLOGIST PROVIDED HISTORY:  Portable if pt not mobile  Technologist Provided Reason for Exam: sob  Acuity: Chronic  Type of Encounter: concerned that elevated cardiac pressures may be exacerbating ASD/PFO  -cont midodrine, may need to inc to 10mg TID  -repeat CXR  -no infectious source at this time.  Check Procal.    Thank you for this consult,    Alex Marie [address] [facility]

## 2019-05-12 NOTE — FLOWSHEET NOTE
provided ministerial presence and comfort for patient wanting to talk about end of life and Hospice care. Patient said, \"I'm ready to go, tired of pain, it's time. \"  Patient said her daughter Kami Roland is reluctant but patient is adamant about hospice. Patient would like to see  for \"last rites\" but not today, she's too tired, maybe tomorrow.  provided prayer and blessing for patient.

## 2019-05-12 NOTE — PROGRESS NOTES
Patient seen for concern of hypoxia and hypotension. Considered initiating BiPAP, but pulmonology notes reviewed and concern noted of poor pulse oximeter readings. Patient seen and examined and noted poor plethysmograph waveforms making it difficult to determine true SpO2. Lungs diminished bilaterally but clear to auscultation; normal respiratory effort. Patient asleep but easily arousable. ABG, CBC, BMP ordered. Vitals BP 82/50, , SpO2 94%, RR 20, T 98.9     5/12/2019 00:32   Hemoglobin, Art, Extended 8.9 (L)   pH, Arterial 7.433   pCO2, Arterial 43.3   pO2, Arterial 65.5 (L)   HCO3, Arterial 28.5   TCO2 (calc), Art 29.8   Base Excess, Arterial 4.3 (H)   O2 Sat, Arterial 93.4   O2 Content, Arterial 11   Methemoglobin, Arterial 1.7 (H)   Carboxyhgb, Arterial 1.2      5/11/2019 23:59   WBC 11.6 (H)   Hemoglobin Quant 9.4 (L)   Platelet Count 903      5/11/2019 23:59   Sodium 136   Potassium 6.0 (HH)   Chloride 91 (L)   CO2 27   BUN 94 (HH)   Creatinine 2.8 (H)   Anion Gap 18 (H)   GFR Non- 16 (A)   GFR African American 19 (A)   Glucose 103 (H)   Calcium 9.3     CXR 5/10/19 09:30   Impression   Findings similar to 03/15/2019.  Bilateral pleural effusions with associated atelectasis/pneumonia.  Possible pulmonary edema. Sepsis with PNA vs overdiuresis   Likely overdiuresis  Transfer to ICU  D/c lasix  IVF at 100ml/hr. Monitor closely for fluid overload given EF 35-40%, moderate/severe TR, and pulmonary HTN  Check lactic acid    NANCY  Prerenal, due to dehydration/overdiuresis  IVF NS at 100ml/hr  Avoid nephrotoxic medications; d/c lisinopril  Renally dose medications  Monitor for electrolyte abnormalities  Nephrology consulted    CHF  D/c lasix as above  Check ProBNP  Place vicente; strict I/Os, daily weights  Hold lisinopril as above    Hyperkalemia  D/c lisinopril  Kayexalate    Possible PNA  Leukocytosis, tachycardia, and hypotension can all be explained by overdiuresis/dehydration. However, CXR 5/10/19 shows bilateral pleural effusions with associated atelectasis vs PNA and possible pulmonary edema  Will start IV cefepime  Check blood and sputum cultures  Check procalcitonin, rapid influenza A/B, respiratory virus panel, strep pneumo and legionella antigens  Supplemental O2, Nebs  pH and pCO2 wnl.  pO2 65.5    Jose Oro, [place]

## 2019-05-13 PROBLEM — E44.0 MODERATE MALNUTRITION (HCC): Status: ACTIVE | Noted: 2019-01-01

## 2019-05-13 NOTE — PROGRESS NOTES
Occupational Therapy  Patient transferred down to ICU therefore will need new therapy orders when medically ready to resume therapy.      Thank you,     Gabriele Babb OTR/L #[id number]

## 2019-05-13 NOTE — PROGRESS NOTES
rate. Regular rhythm. No murmur or rub. No edema. GI: Non-tender. Non-distended. No hernia. Skin: Warm, dry, normal texture and turgor. No nodule on exposed extremities. Lymph: No cervical LAD. No supraclavicular LAD. M/S: No cyanosis. No clubbing. R arm in sling   Neuro: Moves all four extremities. CN 2-12 tested, no defect noted. Psych: Oriented x 3. No anxiety. Awake. Alert. Intact judgement and insight. Data    LABS  CBC:   Recent Labs     05/11/19 2359   WBC 11.6*   HGB 9.4*   HCT 27.9*   MCV 90.5        BMP:   Recent Labs     05/10/19  0910 05/11/19  2359 05/12/19  0744 05/13/19 0449    136 137 136   K 5.0 6.0* 4.9 4.6   CL 95* 91* 100 98*   CO2 27 27 22 22   PHOS 4.3  --   --  4.6   BUN 65* 94* 75* 83*   CREATININE 1.7* 2.8* 2.1* 1.8*     POC GLUCOSE:  No results for input(s): POCGLU in the last 72 hours. LIVER PROFILE:   Recent Labs     05/10/19  0910 05/13/19  0449   LABALBU 3.2* 2.3*     PT/INR: No results for input(s): PROTIME, INR in the last 72 hours. APTT: No results for input(s): APTT in the last 72 hours. UA:  Recent Labs     05/12/19  1130   COLORU YELLOW   PHUR 5.5   WBCUA 117*   RBCUA 2   BACTERIA 1+*   CLARITYU CLOUDY*   SPECGRAV 1.014   LEUKOCYTESUR LARGE*   UROBILINOGEN 0.2   BILIRUBINUR Negative   BLOODU Negative   GLUCOSEU Negative   KETUA Negative     Microbiology:  Wound Culture: No results for input(s): WNDABS, ORG in the last 72 hours. Invalid input(s):  LABGRAM  Nasal Culture: No results for input(s): ORG, MRSAPCR in the last 72 hours. Blood Culture:   Recent Labs     05/12/19  0218   BC No Growth to date. Any change in status will be called. BLOODCULT2 No Growth to date. Any change in status will be called. Fungal Culture:   No results for input(s): FUNGSM in the last 72 hours. No results for input(s): FUNCXBLD in the last 72 hours.   CSF Culture:  No results for input(s): COLORCSF, APPEARCSF, CFTUBE, CLOTCSF, WBCCSF, RBCCSF, NEUTCSF, NUMCELLSCSF, LYMPHSCSF, MONOCSF, GLUCCSF, VOLCSF in the last 72 hours. Respiratory Culture:  Recent Labs     05/12/19  0330   LABGRAM 3+ WBC's (Polymorphonuclear)  1+ Epithelial Cells  3+ Gram positive cocci  1+ Gram negative rods  1+ Gram positive rods       AFB:No results for input(s): AFBSMEAR in the last 72 hours. Urine Culture  No results for input(s): LABURIN in the last 72 hours. RADIOLOGY:    XR CHEST PORTABLE   Final Result   Worsening aeration of left lung consistent with increasing volume loss. Underlying mucous plugging is raised. The findings were sent to the Radiology Results [address] at 10:48   am on 5/12/2019to be communicated to a licensed caregiver. XR CHEST PORTABLE   Final Result   Findings similar to 03/15/2019. Bilateral pleural effusions with associated   atelectasis/pneumonia. Possible pulmonary edema. CT Head WO Contrast   Final Result   There is a defect involving the outer cortex of the left frontal bone. It is   unclear whether this is posttraumatic or postsurgical.  No acute intracranial   abnormality identified. Mild atrophy and chronic white matter changes, similar in appearance. Multilevel degenerative changes in the cervical spine. No acute osseous   abnormality. CT Cervical Spine WO Contrast   Final Result   There is a defect involving the outer cortex of the left frontal bone. It is   unclear whether this is posttraumatic or postsurgical.  No acute intracranial   abnormality identified. Mild atrophy and chronic white matter changes, similar in appearance. Multilevel degenerative changes in the cervical spine. No acute osseous   abnormality. XR HIP 2-3 VW W PELVIS RIGHT   Final Result   Right hip prosthesis is in normal alignment. No acute fracture is   appreciated.          XR SHOULDER RIGHT (MIN 2 VIEWS)   Final Result   Acute comminuted mildly displaced fracture of the proximal right humerus, as described above. CONSULTS:      IP CONSULT TO PULMONOLOGY  IP CONSULT TO NEPHROLOGY  IP CONSULT TO CARDIOLOGY  IP CONSULT TO HOSPICE    ASSESSMENT AND PLAN:      Active Problems:    Atrial fibrillation with RVR ([address]    Closed fracture of right proximal humerus  Resolved Problems:    * No resolved hospital problems. *    The patient is W 35 y.o. female who presents to [facility] with mechanical fall. In ED patient was found to have fractured right humerus along with a fib with RVR. Patient will need to be admitted for further intervention and workup. Second day of admission patient developed acute on chronic resp failure with hypoxia and uncontrolled HR. She was transferred to ICU due to worsening hypoxia and hypotension. She also had AKA with a creatinine going to 2.8. Her chest x-ray suggested a mucous plug and her echo showed a right-to-left shunt. Acute on chronic hypoxic respiratory failure  -now on vaoptherm  -unable to wean off o2   -CXR show mucus plugging , ct pulm toilet     Gram neg pneumonia  - c/s + gram neg rods  - ct abx    Right humeral fracture  - ortho consulted  - conservative management     NANCY on CKD  -nephrology consulted  -creat peaked at 2.8, now trending down     Afib with RVR  -HR uncontrolled  -start amio   -cardiology consult       Acute systolic CHF, EF 08%   -has wall motion abnormality   - no midodrine     Intracardiac shunt   - causing worsening hypoxia      Goals of care comfort- hospice consulted           DVT Prophylaxis: heparin   Diet: DIET GENERAL; Low Sodium (2 GM); Daily Fluid Restriction: 1200 ml; Low Potassium  Dietary Nutrition Supplements: Renal Oral Supplement  Code Status: DNR-CC      Discharge plan - ct care     The patient and / or the family were informed of the results of any tests, a time was given to answer questions, a plan was proposed and they agreed with plan.     Discussed with consulting physicians, nursing and social work     The note was completed using EMR. Every effort was made to ensure accuracy; however, inadvertent computerized transcription errors may be present.        Evert Louise MD

## 2019-05-13 NOTE — FLOWSHEET NOTE
During  visit, daughter Collin Wilkinson voiced concern that Hospice RN would come to room when no family is present. This  assured Collin Wilkinson that Hospice would arrange a family meeting to discuss options.  relayed daughter's concerns to pt's RN, Vee.      05/13/19 1144   Encounter Summary   Services provided to: Patient and family together   Referral/Consult From: Rounding   Place of [address] [place] [address] Completed   Continue Visiting   (5/13 Support to pt and dtr Collinbharat Wilkinson, donald and HC )   Complexity of Encounter Moderate   Length of Encounter 15 minutes   Spiritual/Synagogue   Type Spiritual support   Assessment Calm;Coping   Intervention Active listening;Explored feelings, thoughts, concerns;Prayer;Communion   Outcome Expressed feelings/needs/concerns   Sacraments   Sacrament of Sick-Anointing   (calld [place] - no  until [place] will send [place] )

## 2019-05-13 NOTE — PLAN OF CARE
Problem: Falls - Risk of:  Goal: Will remain free from falls  Description  Will remain free from falls  Outcome: Met This Shift  Note:   No falls this shift  Call light in reach  Bed alarm on  Pt alert and oriented and calls for assist when needed     Problem: Pain:  Description  Pain management should include both nonpharmacologic and pharmacologic interventions.   Goal: Pain level will decrease  Description  Pain level will decrease  Outcome: Met This Shift  Note:   Pain level decreased from 10 to 7 then 4  Obtained relief after po pain med given  Goal: Control of acute pain  Description  Control of acute pain  Outcome: Met This Shift  Note:   Pt has acute pain in right arm/shoulder  Relief after pain med  Goal: Control of chronic pain  Description  Control of chronic pain  Outcome: Met This Shift  Note:   Pt has chronic left hip pain  Pain decreased after po pain  med     Problem: Urinary Elimination:  Goal: Signs and symptoms of infection will decrease  Description  Signs and symptoms of infection will decrease  Outcome: Met This Shift  Note:   No sign of UTI  Eason care done     Problem: Infection:  Goal: Will remain free from infection  Description  Will remain free from infection  Outcome: Met This Shift     Problem: Safety:  Goal: Free from accidental physical injury  Description  Free from accidental physical injury  Outcome: Met This Shift     Problem: Daily Care:  Goal: Daily care needs are met  Description  Daily care needs are met  Outcome: Met This Shift  Note:   Bath, oral care and gown change done     Problem: OXYGENATION/RESPIRATORY FUNCTION  Goal: Patient will maintain patent airway  Outcome: Met This Shift  Note:   Airway patent  Goal: Patient will achieve/maintain normal respiratory rate/effort  Description  Respiratory rate and effort will be within normal limits for the patient  Outcome: Met This Shift     Problem: FLUID AND ELECTROLYTE IMBALANCE  Goal: Fluid and electrolyte balance are achieved/maintained  Outcome: Met This Shift  Note:   Fluid restrictions 1200 in 24 hours  Adequate output per vicente

## 2019-05-13 NOTE — PROGRESS NOTES
Nutrition Assessment    Type and Reason for Visit: Initial(LOS)    Nutrition Recommendations: Add chocolate High Protein Ensure tid for now  Refer to MD for diet liberalization    Nutrition Assessment: Pt meets criteria for moderate malnutrition AEB inadequate po, muscle & fat loss. Pt at risk for further nutrition compromise r/t increased needs, altered labs r/t COPD, humerus fx, altered skin integrity, renal dysfunction. Diet advanced to 2 gm Na / low potassium, 1200 ml per day. Po reported as poor, consuming generally <50 % of trays. Maevero ordered but pt refusing reporting that she will only take chocolate flavored supplement. Will add chocolate high protein Ensure as this supplement contains the least amount of potassium. Noted that pt considering hospice care. Malnutrition Assessment:  · Malnutrition Status: Meets the criteria for moderate malnutrition  · Context: Acute illness or injury  · Findings of the 6 clinical characteristics of malnutrition (Minimum of 2 out of 6 clinical characteristics is required to make the diagnosis of moderate or severe Protein Calorie Malnutrition based on AND/ASPEN Guidelines):  1. Energy Intake-Less than or equal to 50% of estimated energy requirement, Greater than or equal to 5 days    2. Weight Loss-No significant weight loss,    3. Fat Loss-Moderate subcutaneous fat loss(some of which may be r/t aging), Orbital  4. Muscle Loss-Moderate muscle mass loss(some of which may be r/t aging), Temples (temporalis muscle)  5. Fluid Accumulation-No significant fluid accumulation,    6.  Strength-Not measured    Nutrition Risk Level:  Moderate    Nutrient Needs:  · Estimated Daily Total Kcal: 7972-2953 (25-30 x ABW of 50 kg)  · Estimated Daily Protein (g): 60-70 (1.2-1.4 x ABW  · Estimated Daily Total Fluid (ml/day): 1200 per MD    Nutrition Diagnosis:   · Problem: Inadequate oral intake  · Etiology: related to Insufficient energy/nutrient consumption     Signs and

## 2019-05-13 NOTE — CARE COORDINATION
Met with family, will enroll with hospice once discharged from hospital, most likely to inpatient care center, unable to provide for needs at home. Kobe Mercedes RN, [facility]  - [place] Liaison  [address] [place]: [phone]  C: [phone]  F: [phone]  Referrals and Information: [facility] [address] [place] [address]. [facility]  [email or web address]

## 2019-05-13 NOTE — PLAN OF CARE
Nutrition Problem: Inadequate oral intake  Intervention: Food and/or Nutrient Delivery: Continue current diet, Modify current ONS(refer to MD for diet liberalization)  Nutritional Goals: consume >/= 50 %

## 2019-05-13 NOTE — CARE COORDINATION
Palliative care note     I have spoken to her children about hospice and provided list, they have chosen [facility] and are hopeful she can move to inpatient unit. Referral faxed.     Electronically signed by Cece Patricia RN,PN on 5/13/2019 at 2:06 PM  Palliative Care Nurse  Phone [phone]

## 2019-05-13 NOTE — PROGRESS NOTES
4 Eyes Skin Assessment     The patient is being assess for  Shift Handoff    I agree that 2 RN's have performed a thorough Head to Toe Skin Assessment on the patient. ALL assessment sites listed below have been assessed. Areas assessed by both nurses:   [x]   Head, Face, and Ears   [x]   Shoulders, Back, and Chest  [x]   Arms, Elbows, and Hands   [x]   Coccyx, Sacrum, and IschIum  [x]   Legs, Feet, and Heels        Does the Patient have Skin Breakdown?   Yes LDA WOUND CARE was Initiated documentation include the Mirella-wound, Wound Assessment, Measurements, Dressing Treatment, Drainage, and Color\",         Thomas Prevention initiated:  Yes   Wound Care Orders initiated:  Yes      [address]  nurse consulted for Pressure Injury (Stage 3,4, Unstageable, DTI, NWPT, and Complex wounds), New and Established Ostomies:  Yes      Nurse 1 eSignature: Electronically signed by Mariah Red RN on 5/13/19 at 7:53 PM    **SHARE this note so that the co-signing nurse is able to place an eSignature**    Nurse 2 eSignature: Electronically signed by Cecilia Gaytan RN on 5/13/19 at 8:52 PM

## 2019-05-13 NOTE — PROGRESS NOTES
further IVF's Patient considering hospice care D/W daughter WIll be appropriate   2-Hyperkalemia multifactorial, due to NANCY, ACE-I and nutritional supplement improved K+ 4.6 meq Keep off of ACE-I  3-CHF acute systolic/diastolic Continue FR  4 R to L shunt with pul HTN   5 A Fib with RVR   6 R Humeral fracture  7 COPD on home O2 Possible Pneumonia/mucous plugging remains on hi madan O2   8 Hypotension better on Midodrine      Jorden Julio MD, [address]

## 2019-05-13 NOTE — CARE COORDINATION
[address] [address] Continence Nurse  Consult Note       NAME:  Samson Kim  MEDICAL RECORD NUMBER:  [id number]  AGE: 80 y.o. GENDER: female  : 3/11/1930  TODAY'S DATE:  2019    Subjective   Reason for WOCN Evaluation and Assessment: to evaluate and treat \" sacral and heel dti\"       Samson Kim is a 80 y.o. female referred by:   [x] Physician  [x] Nursing  [] Other:     Wound Identification:  Wound Type: pressure  Contributing Factors: chronic pressure, decreased mobility and shear force    Wound History: admitted following a fall at home with a right fracture of humeral head. The patient has been refusing to turn or reposition during the course of her admission due to pain related to arthritis and humeral fracture. [id number] [facility] nurse spoke to patient and daughter today. [id number] [facility] nurse witnessed patient telling bedside nurse Darby Lopez that she can't turn because it hurts to bad. Pt and daughter encouraged to allow nursing staff to turn and reposition patient as scheduled and wear heel protector boots while in bed. Pt agreeable to treatment plan as long as she can sit upright for meals. See wound details, measurements, photos, and plan of care below.        Patient Goal of Care:  [x] Wound Healing  [] Odor Control  [x] Palliative Care  [] Pain Control   [] Other:         PAST MEDICAL HISTORY        Diagnosis Date    [facility]     C. difficile colitis     history of/  negative specimen 2018    Cancer ([facility]     breast left, skin     Cardiomyopathy ([facility]     Chemotherapy     CHF (congestive heart failure) ([facility]     COPD (chronic obstructive pulmonary disease) (HCC)     Dependence on supplemental oxygen     2LNC    Hypertension     Hypothyroidism     Osteoarthritis     Rosacea        PAST SURGICAL HISTORY    Past Surgical History:   Procedure Laterality Date    BREAST SURGERY      Left breast lumpectomy    BUNIONECTOMY Right     CATARACT REMOVAL Bilateral     COLONOSCOPY  2011    EYE Active Problem List   Diagnosis Code    Essential hypertension, benign I10    Cardiomyopathy ([facility] I42.9    SOB (shortness of breath) R06.02    Pulmonary emphysema (HCC) J43.9    Breast cancer ([facility] C50.919    Cystocele [id number]    Rectocele N81.6    9/12/17 RIGHT hip hemiarthroplasty S72.001A    HTN (hypertension) I10    HLD (hyperlipidemia) E78.5    Chronic diastolic CHF (congestive heart failure) ([facility]) I50.32    GERD (gastroesophageal reflux disease) K21.9    Bilateral pulmonary embolism ([facility]) I26.99    Acute on chronic respiratory failure with hypoxia ([facility]) J96.21    Chronic obstructive pulmonary disease ([facility]) J44.9    Centrilobular emphysema ([facility]) J43.2    Dysphagia R13.10    Hypothyroid E03.9    Staphylococcal pneumonia ([facility] J15.20    NANCY (acute kidney injury) ([facility] N17.9    Chronic systolic CHF (congestive heart failure) ([facility]) I50.22    Chronic atrial fibrillation ([facility]) I48.2    Pulmonary edema with congestive heart failure ([facility]) I50.1    Anemia D64.9    Hypotension I95.9    Bradycardia R00.1    Leukopenia D72.819    Thrombocytopenia ([facility]) D69.6    Moderate malnutrition ([facility]) E44.0    Chronic respiratory failure with hypoxia ([facility]) J96.11    Gram-negative pneumonia ([facility]) J15.6    Abnormal radiograph R93.89    CHF (congestive heart failure), NYHA class I, acute on chronic, combined ([facility]) I50.43    Congestive heart failure (HCC) I50.9    Moderate malnutrition (HCC) E44.0    Atrial fibrillation with RVR ([facility]) I48.91    Closed fracture of right proximal humerus S42.201A    Fall W19. XXXA    Permanent atrial fibrillation ([facility] I48.2    Acute respiratory failure with hypoxia ([facility]) J96.01       Measurements:  Wound 05/10/19 Coccyx non- blanchable purple area (Active)   Wound Image    5/13/2019 12:38 PM   Wound Deep tissue/Injury 5/13/2019 12:38 PM   Dressing Status Other (Comment) 5/12/2019  8:27 PM   Dressing Changed Changed/New 5/10/2019 11:30 PM   Dressing/Treatment Calmoseptine 5/13/2019 12:38 PM   Wound Cleansed Rinsed/Irrigated with saline 5/13/2019 12:38 PM   Dressing Change Due 05/13/19 5/13/2019 12:38 PM   Wound Length (cm) 4.5 cm 5/13/2019 12:38 PM   Wound Width (cm) 5.5 cm 5/13/2019 12:38 PM   Wound Depth (cm) 0.1 cm 5/13/2019 12:38 PM   Wound Surface Area (cm^2) 24.75 cm^2 5/13/2019 12:38 PM   Change in Wound Size % (l*w) -312.5 5/13/2019 12:38 PM   Wound Volume (cm^3) 2.48 cm^3 5/13/2019 12:38 PM   Wound Assessment Fluid filled blister;Light purple;Maroon 5/13/2019 12:38 PM   Drainage Amount None 5/13/2019 12:38 PM   Odor None 5/13/2019 12:38 PM   Mirella-wound Assessment Dry; Intact; Swelling 5/13/2019 12:38 PM   Non-staged Wound Description Not applicable 6/19/8842 80:50 PM   Purple%Wound Bed 100 5/13/2019 12:38 PM   Number of days: 2      Coccyx intact deep tissue pressure injury   Pt refusing repositioning days prior before [address] Nurse visit     Coccyx intact deep tissue pressure injury   Pt refusing repositioning days prior before [address]  Nurse visit      Wound 05/12/19 Heel Left (Active)   Wound Image   5/13/2019 12:38 PM   Wound Pressure Stage  1 5/13/2019 12:38 PM   Dressing Status Changed 5/13/2019 12:38 PM   Dressing Changed Changed/New 5/13/2019 12:38 PM   Dressing/Treatment Open to air 5/13/2019 12:38 PM   Wound Length (cm) 0.5 cm 5/12/2019  2:28 AM   Wound Width (cm) 0.5 cm 5/12/2019  2:28 AM   Wound Depth (cm) 0 cm 5/12/2019  2:28 AM   Wound Surface Area (cm^2) 0.25 cm^2 5/12/2019  2:28 AM   Wound Volume (cm^3) 0 cm^3 5/12/2019  2:28 AM   Wound Assessment Red;Non-blanchable erythema 5/13/2019 12:38 PM   Drainage Amount None 5/13/2019 12:38 PM   Odor None 5/13/2019 12:38 PM   Mirella-wound Assessment Dry; Intact 5/13/2019 12:38 PM   Non-staged Wound Description Not applicable 5/89/8045 54:95 PM   Red%Wound Bed 100 5/13/2019 12:38 PM   Purple%Wound Bed 100 5/12/2019  4:00 AM   Number of days: 1      Left heel stage 1 pressure injury     Bilateral heels   Right heel with twice a day. Do Not Cover with dressing leave open to air and place heels in heel protector boot. 3. Will continue to follow   4. Apply Medline Boots (black) to BLE while in bed at all times and/ or when heels resting on surface. (ex. recliner chair foot rest). Check heel placement in heel protector boot every 2 hours   5. Use the Wedge Pillow to reposition the patient while in bed. Keep Patient OFF BACK while in bed   Turn patient to left and right lying positions while in bed     Specialty Bed Required : Yes   [x] Low Air Loss   [x] Pressure Redistribution  [] Fluid Immersion  [] Bariatric  [] Total Pressure Relief  [] Other:     Current Diet: DIET GENERAL; Low Sodium (2 GM);  Daily Fluid Restriction: 1200 ml; Low Potassium  Dietary Nutrition Supplements: Low Calorie High Protein Supplement  Dietician consult:  Yes    Discharge Plan:  Placement for patient upon discharge: home with support    Patient appropriate for Outpatient [address]: Yes    Referrals:  []   [] [facility]  [] Supplies  [] Other    Patient/Caregiver Teaching:  Level of patient/caregiver understanding able to: Spoke with bedside nurse [facility] about plan of care    [x] Indicates understanding       [x] Needs reinforcement  [] Unsuccessful      [] Verbal Understanding  [] Demonstrated understanding       [] No evidence of learning  [] Refused teaching         [] N/A       Electronically signed by Emmie Robles RN, [address] on 5/13/2019 at 1:03 PM

## 2019-05-13 NOTE — PROGRESS NOTES
9700 Report received from [facility] Pt assessed. B/P low but stable=97/65, GJ=610 A-fib and Resp Rate=21, afebrile with temp=98.6. Pt reports level 10 pain in left hip and states it is because she was turned on left side and she has had some chronic pain in left hip on and off. Pt alert and oriented. Breakfast tray ordered per pt's request. Pt only ordered liquids and reports \"poor appetite and weakness\". Dietician made change so that pt could have Ensure chocolate flavor. Pt aware of fluid restrictions. Sling and swathe secure to right arm.  0920 AM scheduled meds given on bites of applesauce per request. Medicated prn for c/o pain in left hip. Pt refused Calcium pill. Critical Care in to see pt on rounds. Update given and new orders noted. Pt supine with HOB up awaiting tray. 56 Daughter in to visit and brought soup from home and fed pt some bites. Dr. Daryl German in to see pt and update given. Pt took about 50% of Ensure and sips of coffee. 1100 WOCN in to see pt and evaluate DTI on coccyx. Discussed importance of turning to prevent bedsore and agreeable to turn and turned to left side. 1300 Pt turned and repositioned to right side. B/P low and scheduled Midodrine given po with applesauce. Reassessed pt. Pain in left hip now @ controlled level=4. No other c/o pain @ present. 1430 Pt asleep without apparent distress. B/P low in 60s after Midodrine. Will notify MD. Geno Velázquez of [facility] in to see pt and speak with family. Family went to waiting area to talk with [address] [place] [address]. 1445 Pt awakened with a congested productive cough and coughed up blood tinged thick sputum. HR up in 140s while coughing and B/P improved to 120/75.  1450 HR down in 110s and sat=90%. Pt turned and repositioned  1540 Medicated prn for pain level 7 in right shoulder and left hip. Took pill in bite of applesauce. 91 [facility] RN stated that pt will go tomorrow to Hospice. Repositioned. 1615 VSS Afebrile.  Pt given bath with chlorhexidine wipes and gown change done. Ordered med applied to DTI on coccyx/sacrum and pt repositioned with 2 nurse assist. Peyton Marker. 1830 Pt took a small amt of dinner. Pt only ordered broth and Ensure. Pain controlled @ level 4. VSS. Scheduled meds given with applesauce. Repositioned. Pt with productive cough @ times, yellow and blood tinged. 1920 Report given to [facility] and bedside handoff done and pt repositioned for comfort.    Electronically signed by Victorino Nice RN on 5/13/2019 at 7:41 PM

## 2019-05-13 NOTE — PROGRESS NOTES
Pulmonary Progress Note    CC: Acute hypoxic respiratory failure  Mucous plugging  Gram negative pneumonia   Centrilobular emphysema  Right humeral fracture  Acute systolic heart failure    24 hr events:  She reports mild shortness of breath. She has a cough with clear mucous. ROS:  +SOB  +cough  No vomiting       PHYSICAL EXAM:  Blood pressure (!) 81/51, pulse 112, temperature 97.3 °F (36.3 °C), temperature source Oral, resp. rate 18, height 4' 11\" (1.499 m), weight 108 lb 11 oz (49.3 kg), SpO2 94 %, not currently breastfeeding. on vapotherm 25L, 95%    Intake/Output Summary (Last 24 hours) at 5/13/2019 0734  Last data filed at 5/13/2019 0319  Gross per 24 hour   Intake 585 ml   Output 980 ml   Net -395 ml       Gen: Well developed; well nourished  Eyes: No scleral icterus. No conjunctival injection. ENT: External appearance of ears and nose normal.  Neck: Trachea midline. No obvious mass. No visible thyroid enlargement    Respiratory: Decreased breath sounds over left lung, no accessory muscle use  Cardiovascular: Irregular, no appreciable murmurs. No edema. Gastrointestinal: Soft, non-tender. No hernia  Skin: Warm and dry. No rashes or ulcers on visible areas. Normal texture and turgor  Musculoskeletal: No cyanosis, clubbing or joint deformity.     Psychiatric: Normal mood and affect; exhibits normal insight and judgement     Medications:  Current Facility-Administered Medications: lactobacillus (CULTURELLE) capsule 1 capsule, 1 capsule, Oral, BID WC  albuterol sulfate  (90 Base) MCG/ACT inhaler 2 puff, 2 puff, Inhalation, Q2H PRN  ipratropium-albuterol (DUONEB) nebulizer solution 1 ampule, 1 ampule, Inhalation, Q4H WA  cefepime (MAXIPIME) 1 g IVPB minibag, 1 g, Intravenous, Q24H  perflutren lipid microspheres (DEFINITY) injection 1.65 mg, 1.5 mL, Intravenous, ONCE PRN  sennosides-docusate sodium (SENOKOT-S) 8.6-50 MG tablet 2 tablet, 2 tablet, Oral, BID  oyster shell calcium/vitamin D 250-125 Assessment:    Acute hypoxic respiratory failure  Mucous plugging  Gram negative pneumonia   Centrilobular emphysema  Right humeral fracture  Acute systolic heart failure      Plan:  Acute hypoxic respiratory failure  - Due to pneumonia and mucous plugging  - On vapotherm. Wean as able to keep saturation>90%    Mucous plugging  - 3% saline nebs 3 times daily  - Duonebs every 4 hours  - Acapella every 2 hours    Gram negative pneumonia   - Continue cefepime  - Follow culture data    Centrilobular emphysema  - Dulera  - Duonebs every 4 hours    Right humeral fracture  - Non-weight bearing  - To follow up with orthopedic surgery as an outpatient    Acute systolic heart failure  - Liberate fluid restriction to 2L      Prophylaxis  DVT- SQ heparin   GI- protonix  C.difficile: lactobacillus     Patient is requesting to meet with hospice. This is being arranged. Patient is at high risk given the presence of respiratory failure requiring vapotherm.     Dana Ramírez MD  [place] [facility], Critical Care and Sleep

## 2019-05-14 NOTE — PROGRESS NOTES
0930 Patient awake, resting quietly in bed at present time. Eason patent to gravity bedside drainage. Continues with Vapotherm. RUE remains in sling and swath. Assessment completed and documented. Patient wants to go to Hospice today. Dr Jose Sexton at bedside on AM rounds, new orders noted. Spoke with social work, Juan Antonio Crump, informed of patient's request to be transferred to inpatient hospice today. Daughter, Saúl De Leon, called for update on patient and informed of patient's request to be transferred to inpatient hospice today. 56 Dr Dickson at bedside to see patient on AM rounds, new orders noted. LiaMarleny hearn, spoke with Dr Gustavo Bravo. 1140 Patient resting quietly in bed at present time. Assisted by PCA with eating breakfast, poor appetite noted. 1355 Patient asleep at present time. No outward evidence of any discomfort or distress noted. 1610 No changes since patient assessment. [facility] Marleny chirinos, met with patient, transportation  arranged for 7PM this evening to [facility]. Patient's son at bedside. [address] to transport patient to [facility] inpatient hospice unit. Patient's daughter, Saúl De Leon, present at time of transfer. Patient placed on NRB for transport. Personal belongings sent with patient.   Electronically signed by Genie Espino RN on 5/14/2019 at 7:30 PM

## 2019-05-14 NOTE — PROGRESS NOTES
Progress Note  Admit Date: 2019      PCP: Tom Reeves MD     CC: F/U for     SUBJECTIVE / Interval History:  Still very SOB   on vapotherm, was hypoxic , now of Fi02 of 100           Allergies  Adhesive tape    Medications    Scheduled Meds:   heparin (porcine)  5,000 Units Subcutaneous BID    sodium chloride (Inhalant)  15 mL Nebulization TID    amiodarone  200 mg Oral BID    VENELEX   Topical BID    lactobacillus  1 capsule Oral BID WC    ipratropium-albuterol  1 ampule Inhalation Q4H WA    cefepime  1 g Intravenous Q24H    sennosides-docusate sodium  2 tablet Oral BID    oyster shell calcium/vitamin D  1 tablet Oral Daily    docusate sodium  100 mg Oral Daily    pantoprazole  40 mg Oral QAM AC    mometasone-formoterol  2 puff Inhalation BID    levothyroxine  100 mcg Oral Daily    midodrine  5 mg Oral TID WC    vitamin D  1,000 Units Oral Daily    sodium chloride flush  10 mL Intravenous 2 times per day     Continuous Infusions:    PRN Meds:  albuterol sulfate HFA, perflutren lipid microspheres, diclofenac sodium, guaiFENesin, sodium chloride flush, ondansetron, acetaminophen, HYDROcodone 5 mg - acetaminophen    Vitals    TEMPERATURE:  Current - Temp: 98 °F (36.7 °C); Max - Temp  Av.4 °F (36.3 °C)  Min: 94 °F (34.4 °C)  Max: 98.6 °F (37 °C)  RESPIRATIONS RANGE: Resp  Av.8  Min: 14  Max: 30  PULSE RANGE: Pulse  Av.7  Min: 94  Max: 125  BLOOD PRESSURE RANGE:  Systolic (58NZB), BPF:22 , Min:58 , ANASTACIA:800   ; Diastolic (96ERH), HHP:68, Min:36, Max:75    PULSE OXIMETRY RANGE: SpO2  Av.6 %  Min: 83 %  Max: 100 %  24HR INTAKE/OUTPUT:      Intake/Output Summary (Last 24 hours) at 2019 0738  Last data filed at 2019 0518  Gross per 24 hour   Intake 780 ml   Output 991 ml   Net -211 ml       Exam:    Gen: No distress. Ill looking   Eyes: PERRL. No sclera icterus. No conjunctival injection. ENT: No discharge. Pharynx clear.  External appearance of ears and nose hours. No results for input(s): FUNCXBLD in the last 72 hours. CSF Culture:  No results for input(s): COLORCSF, APPEARCSF, CFTUBE, CLOTCSF, WBCCSF, RBCCSF, NEUTCSF, NUMCELLSCSF, LYMPHSCSF, MONOCSF, GLUCCSF, VOLCSF in the last 72 hours. Respiratory Culture:  Recent Labs     05/12/19  0330   CULTRESP Heavy growth normal respiratory david with*  Heavy growth  ID and sensitivity to follow     LABGRAM 3+ WBC's (Polymorphonuclear)  1+ Epithelial Cells  3+ Gram positive cocci  1+ Gram negative rods  1+ Gram positive rods       AFB:No results for input(s): AFBSMEAR in the last 72 hours. Urine Culture  No results for input(s): LABURIN in the last 72 hours. RADIOLOGY:    XR CHEST 1 VW   Final Result   Progressive opacification of the left hemithorax with now complete left lung   atelectasis/collapse. Unchanged right effusion. XR CHEST PORTABLE   Final Result   Worsening aeration of left lung consistent with increasing volume loss. Underlying mucous plugging is raised. The findings were sent to the Radiology Results [address] at 10:48   am on 5/12/2019to be communicated to a licensed caregiver. XR CHEST PORTABLE   Final Result   Findings similar to 03/15/2019. Bilateral pleural effusions with associated   atelectasis/pneumonia. Possible pulmonary edema. CT Head WO Contrast   Final Result   There is a defect involving the outer cortex of the left frontal bone. It is   unclear whether this is posttraumatic or postsurgical.  No acute intracranial   abnormality identified. Mild atrophy and chronic white matter changes, similar in appearance. Multilevel degenerative changes in the cervical spine. No acute osseous   abnormality. CT Cervical Spine WO Contrast   Final Result   There is a defect involving the outer cortex of the left frontal bone. It is   unclear whether this is posttraumatic or postsurgical.  No acute intracranial   abnormality identified. Mild atrophy and chronic white matter changes, similar in appearance. Multilevel degenerative changes in the cervical spine. No acute osseous   abnormality. XR HIP 2-3 VW W PELVIS RIGHT   Final Result   Right hip prosthesis is in normal alignment. No acute fracture is   appreciated. XR SHOULDER RIGHT (MIN 2 VIEWS)   Final Result   Acute comminuted mildly displaced fracture of the proximal right humerus, as   described above. CONSULTS:      IP CONSULT TO PULMONOLOGY  IP CONSULT TO NEPHROLOGY  IP CONSULT TO CARDIOLOGY  IP CONSULT TO HOSPICE    ASSESSMENT AND PLAN:      Active Problems:    Atrial fibrillation with RVR ([address]    Closed fracture of right proximal humerus    Fall    Permanent atrial fibrillation ([address]    Acute respiratory failure with hypoxia (HCC)    Mucus plugging of bronchi    Moderate malnutrition (HCC)  Resolved Problems:    * No resolved hospital problems. *    The patient is Z 48 y.o. female who presents to [facility] with mechanical fall. In ED patient was found to have fractured right humerus along with a fib with RVR. Patient will need to be admitted for further intervention and workup. Second day of admission patient developed acute on chronic resp failure with hypoxia and uncontrolled HR. She was transferred to ICU due to worsening hypoxia and hypotension. She also had AKA with a creatinine going to 2.8. Her chest x-ray suggested a mucous plug and her echo showed a right-to-left shunt. Patient's respiratory failure got worse in the hospital. She also had gram-negative pneumonia due to enterococcus. She had A. fib with RVR with uncontrolled heart rate. Patient and family did not want any aggressive treatment and wanted to be comfortable and hospice was consulted and patient was discharged with hospice to inpatient facility.     Acute on chronic hypoxic respiratory failure- worse   -now on vaoptherm  -unable to wean off o2   -CXR show mucus plugging , ct pulm toilet     Gram neg pneumonia  - c/s + gram neg rods  - ct abx  -pro maikel elevated     Right humeral fracture  - ortho consulted  - conservative management     NANCY on CKD  -nephrology consulted  -creat peaked at 2.8, now trending down     Afib with RVR  -HR uncontrolled  -start amio   -cardiology consult pending       Acute systolic CHF, EF 69%   -has wall motion abnormality   - no midodrine     Intracardiac shunt   - causing worsening hypoxia      Goals of care comfort- hospice consulted           DVT Prophylaxis: heparin   Diet: Dietary Nutrition Supplements: Low Calorie High Protein Supplement  DIET GENERAL; Low Sodium (2 GM); Daily Fluid Restriction: 2000 ml; Low Potassium  Code Status: [facility]      Discharge plan - today    The patient and / or the family were informed of the results of any tests, a time was given to answer questions, a plan was proposed and they agreed with plan. Discussed with consulting physicians, nursing and social work     The note was completed using EMR. Every effort was made to ensure accuracy; however, inadvertent computerized transcription errors may be present.        Brittnee Arreola MD

## 2019-05-14 NOTE — PLAN OF CARE
Problem: Falls - Risk of:  Goal: Will remain free from falls  Description  Will remain free from falls  5/14/2019 1216 by Jose Reyna RN  Outcome: Ongoing  Falling star program remains in place. Call light and personal belongings within reach. Frequent visual monitoring continues. Toileting program inplace. Patient assisted in turning/repositioning at least once every 2 hours, and on a prn basis. 5/13/2019 2349 by Kellen Bojorquez RN  Outcome: Ongoing  Note:   Falling star program remains in place. Call light and personal belongings within reach. Frequent visual monitoring continues. Toileting program inplace. Patient assisted in turning/repositioning at least once every 2 hours, and on a prn basis. Problem: Pain:  Goal: Pain level will decrease  Description  Pain level will decrease  5/14/2019 1216 by Jose Reyna RN  Outcome: Ongoing  Continuing to monitor pain and discomfort. Monitoring pain level on scale of 0-10. Non- pharmacological measures encouraged to reduce discomfort/pain. PRN pain meds administeration continues when/if applicable as ordered by physician. 5/13/2019 2349 by Kellen Bojorquez RN  Outcome: Ongoing  Note:   Pain managed with pharmacological and non-pharmacological interventions. Problem: Infection:  Goal: Will remain free from infection  Description  Will remain free from infection. Vicente catheter noted to be intact and secured with use of stat-lock. No signs of infection noted to urine or angelica area; including an absence of urine odor/discoloration, or angelica area swelling/redness/tenderness/drainage. Angelica care completed per shift and prn basis. Vicente bag suspended from bed per protocol. Continued monitoring for risks of infection remains in place. Will re-assess clinical need for continued vicente catheterization; Will discontinue per protocol/physician order.       Outcome: Ongoing   No active infections     Problem: Daily Care:  Goal: Daily care needs are met  Description  Daily care needs are met  Outcome: Ongoing  Daily care needs continuing to be assessed. Assistance provided when necessary. Patient encouraged to express needs/concerns. Call light and personal belongings within reach. Problem: FLUID AND ELECTROLYTE IMBALANCE  Goal: Fluid and electrolyte balance are achieved/maintained  5/14/2019 1216 by Waleska Mosley RN  Outcome: Ongoing  Continuing to monitor labs and vital signs. No present signs of fluid overload/depletion. Continuing to monitor I&O's per protocol. Patient denies nausea/vomiting/diarrhea. IV/INT assessments continue. Monitoring for signs/symptoms of unusual bleeding. 5/13/2019 2349 by Preeti Landin RN  Outcome: Ongoing  Note:   Fluid restriction of 1500 ml per day.   Electronically signed by Waleska Mosley RN on 5/14/2019 at 12:19 PM

## 2019-05-14 NOTE — PROGRESS NOTES
4 Eyes Skin Assessment     The patient is being assess for  Shift Handoff    I agree that 2 RN's have performed a thorough Head to Toe Skin Assessment on the patient. ALL assessment sites listed below have been assessed. Areas assessed by both nurses:   [x]   Head, Face, and Ears   [x]   Shoulders, Back, and Chest  [x]   Arms, Elbows, and Hands   [x]   Coccyx, Sacrum, and IschIum  [x]   Legs, Feet, and Heels        Does the Patient have Skin Breakdown?   Yes LDA WOUND CARE was Initiated documentation include the Mirella-wound, Wound Assessment, Measurements, Dressing Treatment, Drainage, and Color\",         Thomas Prevention initiated:  Yes   Wound Care Orders initiated:  Yes      [id number]  nurse consulted for Pressure Injury (Stage 3,4, Unstageable, DTI, NWPT, and Complex wounds), New and Established Ostomies:  Yes      Nurse 1 eSignature: Electronically signed by Yosi Dubon RN on 5/14/19 at 7:39 AM    **SHARE this note so that the co-signing nurse is able to place an eSignature**    Nurse 2 eSignature:  Electronically signed by Ry Wadsworth RN on 5/14/2019 at 12:20 PM   Electronically signed by Ry Wadsworth RN on 5/14/19 at 12:20 PM

## 2019-05-14 NOTE — PLAN OF CARE
Problem: Falls - Risk of:  Goal: Will remain free from falls  Description  Will remain free from falls  5/13/2019 2349 by Rl Dorado RN  Outcome: Ongoing  Note:   Falling star program remains in place. Call light and personal belongings within reach. Frequent visual monitoring continues. Toileting program inplace. Patient assisted in turning/repositioning at least once every 2 hours, and on a prn basis. 5/13/2019 1729 by Amy Blas RN  Outcome: Met This Shift  Note:   No falls this shift  Call light in reach  Bed alarm on  Pt alert and oriented and calls for assist when needed     Problem: Pain:  Goal: Pain level will decrease  Description  Pain level will decrease  5/13/2019 2349 by Rl Dorado RN  Outcome: Ongoing  Note:   Pain managed with pharmacological and non-pharmacological interventions. 5/13/2019 1729 by Amy Blas RN  Outcome: Met This Shift  Note:   Pain level decreased from 10 to 7 then 4  Obtained relief after po pain med given     Problem: Urinary Elimination:  Goal: Signs and symptoms of infection will decrease  Description  Signs and symptoms of infection will decrease  5/13/2019 2349 by Rl Dorado RN  Outcome: Ongoing  Note:   Vicente care every shift and as needed. Cath secure in place. 5/13/2019 1729 by Amy Blas RN  Outcome: Met This Shift  Note:   No sign of UTI  Vicente care done     Problem: FLUID AND ELECTROLYTE IMBALANCE  Goal: Fluid and electrolyte balance are achieved/maintained  5/13/2019 2349 by Rl Dorado RN  Outcome: Ongoing  Note:   Fluid restriction of 1500 ml per day.   5/13/2019 1729 by Amy Blas RN  Outcome: Met This Shift  Note:   Fluid restrictions 1200 in 24 hours  Adequate output per vicente     Problem: Skin Integrity:  Goal: Absence of new skin breakdown  Description  Absence of new skin breakdown  5/13/2019 1729 by Amy Blas RN  Outcome: Ongoing  Note:   Pt seen by WORAMONA for DTI on coccyx/sacrum  Ordered medication applied After importance explained to pt, pt agrees to allow turn and reposition every 2 hours to prevent further breakdown

## 2019-05-14 NOTE — PROGRESS NOTES
lipid microspheres (DEFINITY) injection 1.65 mg, 1.5 mL, Intravenous, ONCE PRN  sennosides-docusate sodium (SENOKOT-S) 8.6-50 MG tablet 2 tablet, 2 tablet, Oral, BID  oyster shell calcium/vitamin D 250-125 MG-UNIT per tablet 250 mg, 1 tablet, Oral, Daily  diclofenac sodium 1 % gel 2 g, 2 g, Topical, 4x Daily PRN  docusate sodium (COLACE) capsule 100 mg, 100 mg, Oral, Daily  pantoprazole (PROTONIX) tablet 40 mg, 40 mg, Oral, QAM AC  mometasone-formoterol (DULERA) 100-5 MCG/ACT inhaler 2 puff, 2 puff, Inhalation, BID  guaiFENesin (MUCINEX) extended release tablet 600 mg, 600 mg, Oral, BID PRN  levothyroxine (SYNTHROID) tablet 100 mcg, 100 mcg, Oral, Daily  midodrine (PROAMATINE) tablet 5 mg, 5 mg, Oral, TID WC  vitamin D (CHOLECALCIFEROL) tablet 1,000 Units, 1,000 Units, Oral, Daily  sodium chloride flush 0.9 % injection 10 mL, 10 mL, Intravenous, 2 times per day  sodium chloride flush 0.9 % injection 10 mL, 10 mL, Intravenous, PRN  ondansetron (ZOFRAN) injection 4 mg, 4 mg, Intravenous, Q6H PRN  acetaminophen (TYLENOL) tablet 650 mg, 650 mg, Oral, Q4H PRN  HYDROcodone-acetaminophen (NORCO) 5-325 MG per tablet 1 tablet, 1 tablet, Oral, Q4H PRN    Data reviewed:  Labs:  CBC:   Recent Labs     05/11/19  2359 05/13/19 0449 05/14/19  0433   WBC 11.6* 9.2 8.8   HGB 9.4* 8.6* 8.7*   HCT 27.9* 25.9* 25.3*   MCV 90.5 94.5 90.7    231 284     BMP:   Recent Labs     05/12/19  0744 05/13/19 0449 05/14/19  0433    136 138   K 4.9 4.6 4.6    98* 97*   CO2 22 22 27   PHOS  --  4.6 3.8   BUN 75* 83* 88*   CREATININE 2.1* 1.8* 1.8*     LIVER PROFILE: No results for input(s): AST, ALT, LIPASE, BILIDIR, BILITOT, ALKPHOS in the last 72 hours. Invalid input(s): AMYLASE,  ALB  PT/INR: No results for input(s): PROTIME, INR in the last 72 hours. APTT: No results for input(s): APTT in the last 72 hours. Cultures:   Blood culture (5/12): NGTD  Sputum culture (5/12):  Enterobacter   Urine strep and legionella antigens: Negative  Flu swab: Negative      Films:  Chest images and reports were reviewed by me. My interpretation is:  CXR (5/14/19): Left lung atelectasis; right effusion       Assessment:   Acute hypoxic respiratory failure  Mucous plugging  Gram negative pneumonia   Centrilobular emphysema  Right humeral fracture  Acute systolic heart failure      Plan:  Acute hypoxic respiratory failure  - Due to pneumonia, mucous plugging and right to left intracardiac shunt   - On vapotherm. Wean as able to keep saturation>90%    Mucous plugging  - 3% saline nebs 3 times daily  - Duonebs every 4 hours  - Acapella every 2 hours    Gram negative pneumonia   - Has enterobacter in sputum. Change cefepime to ciprofloxacin     Centrilobular emphysema  - Dulera  - Duonebs every 4 hours    Right humeral fracture  - Non-weight bearing per orthopedic surgery     Acute systolic heart failure  - Discontinue fluid restriction       Prophylaxis  DVT- SQ heparin   GI- protonix  C.difficile: lactobacillus     Patient requests hospice. She feels she has no quality of life. We discussed that her hypoxia is at least in part due to left lung atelectasis. We discussed possible bronchoscopy as well as the possibility of requiring mechanical ventilation if this were pursued. She does not wish to undergo this procedure. She wishes to be transitioned to hospice. Plan to discharge to hospice today. Patient is at high risk given the presence of respiratory failure requiring vapotherm.     Maya Louis MD  [facility] Pulmonary, Critical Care and Sleep

## 2019-05-14 NOTE — DISCHARGE SUMMARY
comfortable and hospice was consulted and patient was discharged with hospice to inpatient facility        Consults:     IP CONSULT TO PULMONOLOGY  IP CONSULT TO NEPHROLOGY  IP CONSULT TO CARDIOLOGY  IP CONSULT TO HOSPICE        Disposition: hospice    Discharged Condition: unstable    Code Status: DNR-CC    Activity: activity as tolerated    Diet: regular diet        Labs:  For convenience and continuity at follow-up the following most recent labs are provided:    CBC:   Lab Results   Component Value Date    WBC 8.8 05/14/2019    HGB 8.7 05/14/2019    HCT 25.3 05/14/2019     05/14/2019       RENAL:   Lab Results   Component Value Date     05/14/2019    K 4.6 05/14/2019    K 4.9 05/12/2019    CL 97 05/14/2019    CO2 27 05/14/2019    BUN 88 05/14/2019    CREATININE 1.8 05/14/2019           Discharge Medications:    Zora Spaulding   Home Medication Instructions [id number]    Printed on:05/14/19 5504   Medication Information                      albuterol sulfate HFA (PROAIR HFA) 108 (90 Base) MCG/ACT inhaler  Inhale 2 puffs into the lungs every 4 hours as needed for Wheezing or Shortness of Breath             ciprofloxacin (CIPRO) 500 MG tablet  Take 1 tablet by mouth 2 times daily for 5 days             diclofenac sodium 1 % GEL  Apply 2 g topically 4 times daily as needed for Pain (Back and left hifp)              fluticasone-salmeterol (ADVAIR HFA) 115-21 MCG/ACT inhaler  Inhale 2 puffs into the lungs 2 times daily             midodrine (PROAMATINE) 5 MG tablet  Take 1 tablet by mouth 3 times daily (with meals)             Probiotic Product (PROBIOTIC DAILY PO)  Take 1 tablet by mouth daily as needed (antibiotic use)              tiotropium (SPIRIVA RESPIMAT) 1.25 MCG/ACT AERS inhaler  Inhale 2 puffs into the lungs daily                 Future Appointments   Date Time Provider Kevin Haji   6/4/2019 11:30 AM Gretta Thurston MD [facility]       Time Spent on discharge is more than 45 minutes in

## 2019-05-14 NOTE — PROGRESS NOTES
1935  Hand off completed. Pt resting quietly. Daughter at bedside. Appears comfortable. Pt now more cooperative with repositioning. Ointment ordered for DTI on sacrum. Vapotherm at 30 liter HF, 100 % FiO2.  100 % sats. Denies SOB while at rest.  Sling intact. BP still in the 80's. Alert and oriented. Assessment completed. EHOBs on. Needs provided. Ice pack to R shoulder and left hip for pain management. Call light within reach. 2100  Daughter at bedside. VSS.  2200  Norco 1 tab given for pain. Ice pack to R shoulders. Repositioned. 2330  Resting quietly. Call light within reach. Repositioned. 0200  Pt cleaned and changed. 0235  Pain med requested. See eMar.  0410  Repositioned. Encouraged rest and sleep. Needs provided. Safety ensured. 0600  Awake. Repositioned. 0720  Report and hand off given to [facility].

## 2019-05-14 NOTE — CARE COORDINATION
[facility] inpatient care center by [address] at 7321. Georgina Maloney RN, [facility]  - [place]ia[facility]  [address] [place]: [phone]  C: [phone]  F: [phone]  Referrals and Information: [address] [facility]. [facility]  [email or web address]

## 2019-05-17 ENCOUNTER — TELEPHONE (OUTPATIENT)
Dept: CARDIOLOGY CLINIC | Age: 84
End: 2019-05-17

## 2019-05-17 LAB
BLOOD CULTURE, ROUTINE: NORMAL
CULTURE, BLOOD 2: NORMAL

## 2019-05-17 NOTE — TELEPHONE ENCOUNTER
Patient's daughter is calling to let Dr. Niharika Barajas know that the pt passed away. Rafa Presume would like a call back from Dr. Niharika Barajas at [phone].